# Patient Record
Sex: FEMALE | Race: WHITE | NOT HISPANIC OR LATINO | URBAN - METROPOLITAN AREA
[De-identification: names, ages, dates, MRNs, and addresses within clinical notes are randomized per-mention and may not be internally consistent; named-entity substitution may affect disease eponyms.]

---

## 2020-11-22 ENCOUNTER — INPATIENT (INPATIENT)
Facility: HOSPITAL | Age: 85
LOS: 18 days | Discharge: HOME | DRG: 872 | End: 2020-12-11
Attending: INTERNAL MEDICINE | Admitting: INTERNAL MEDICINE
Payer: MEDICARE

## 2020-11-22 VITALS
SYSTOLIC BLOOD PRESSURE: 131 MMHG | HEIGHT: 63 IN | OXYGEN SATURATION: 97 % | HEART RATE: 79 BPM | RESPIRATION RATE: 16 BRPM | DIASTOLIC BLOOD PRESSURE: 85 MMHG | WEIGHT: 126.1 LBS | TEMPERATURE: 98 F

## 2020-11-22 DIAGNOSIS — A41.9 SEPSIS, UNSPECIFIED ORGANISM: ICD-10-CM

## 2020-11-22 DIAGNOSIS — Z29.9 ENCOUNTER FOR PROPHYLACTIC MEASURES, UNSPECIFIED: ICD-10-CM

## 2020-11-22 DIAGNOSIS — I10 ESSENTIAL (PRIMARY) HYPERTENSION: ICD-10-CM

## 2020-11-22 DIAGNOSIS — H26.9 UNSPECIFIED CATARACT: Chronic | ICD-10-CM

## 2020-11-22 LAB
ALBUMIN SERPL ELPH-MCNC: 2.6 G/DL — LOW (ref 3.5–5)
ALP SERPL-CCNC: 72 U/L — SIGNIFICANT CHANGE UP (ref 40–120)
ALT FLD-CCNC: 23 U/L DA — SIGNIFICANT CHANGE UP (ref 10–60)
ANION GAP SERPL CALC-SCNC: 6 MMOL/L — SIGNIFICANT CHANGE UP (ref 5–17)
APPEARANCE UR: ABNORMAL
AST SERPL-CCNC: 32 U/L — SIGNIFICANT CHANGE UP (ref 10–40)
BASOPHILS # BLD AUTO: 0.02 K/UL — SIGNIFICANT CHANGE UP (ref 0–0.2)
BASOPHILS NFR BLD AUTO: 0.2 % — SIGNIFICANT CHANGE UP (ref 0–2)
BILIRUB SERPL-MCNC: 0.7 MG/DL — SIGNIFICANT CHANGE UP (ref 0.2–1.2)
BILIRUB UR-MCNC: NEGATIVE — SIGNIFICANT CHANGE UP
BUN SERPL-MCNC: 38 MG/DL — HIGH (ref 7–18)
CALCIUM SERPL-MCNC: 9.1 MG/DL — SIGNIFICANT CHANGE UP (ref 8.4–10.5)
CHLORIDE SERPL-SCNC: 102 MMOL/L — SIGNIFICANT CHANGE UP (ref 96–108)
CO2 SERPL-SCNC: 30 MMOL/L — SIGNIFICANT CHANGE UP (ref 22–31)
COLOR SPEC: YELLOW — SIGNIFICANT CHANGE UP
CREAT SERPL-MCNC: 1.11 MG/DL — SIGNIFICANT CHANGE UP (ref 0.5–1.3)
DIFF PNL FLD: ABNORMAL
EOSINOPHIL # BLD AUTO: 0.01 K/UL — SIGNIFICANT CHANGE UP (ref 0–0.5)
EOSINOPHIL NFR BLD AUTO: 0.1 % — SIGNIFICANT CHANGE UP (ref 0–6)
GLUCOSE SERPL-MCNC: 92 MG/DL — SIGNIFICANT CHANGE UP (ref 70–99)
GLUCOSE UR QL: NEGATIVE — SIGNIFICANT CHANGE UP
HCT VFR BLD CALC: 37.6 % — SIGNIFICANT CHANGE UP (ref 34.5–45)
HGB BLD-MCNC: 12.3 G/DL — SIGNIFICANT CHANGE UP (ref 11.5–15.5)
IMM GRANULOCYTES NFR BLD AUTO: 0.6 % — SIGNIFICANT CHANGE UP (ref 0–1.5)
KETONES UR-MCNC: NEGATIVE — SIGNIFICANT CHANGE UP
LACTATE SERPL-SCNC: 1.2 MMOL/L — SIGNIFICANT CHANGE UP (ref 0.7–2)
LEUKOCYTE ESTERASE UR-ACNC: ABNORMAL
LYMPHOCYTES # BLD AUTO: 0.76 K/UL — LOW (ref 1–3.3)
LYMPHOCYTES # BLD AUTO: 6.1 % — LOW (ref 13–44)
MCHC RBC-ENTMCNC: 31.3 PG — SIGNIFICANT CHANGE UP (ref 27–34)
MCHC RBC-ENTMCNC: 32.7 GM/DL — SIGNIFICANT CHANGE UP (ref 32–36)
MCV RBC AUTO: 95.7 FL — SIGNIFICANT CHANGE UP (ref 80–100)
MONOCYTES # BLD AUTO: 1.47 K/UL — HIGH (ref 0–0.9)
MONOCYTES NFR BLD AUTO: 11.9 % — SIGNIFICANT CHANGE UP (ref 2–14)
NEUTROPHILS # BLD AUTO: 10.03 K/UL — HIGH (ref 1.8–7.4)
NEUTROPHILS NFR BLD AUTO: 81.1 % — HIGH (ref 43–77)
NITRITE UR-MCNC: POSITIVE
NRBC # BLD: 0 /100 WBCS — SIGNIFICANT CHANGE UP (ref 0–0)
PH UR: 5 — SIGNIFICANT CHANGE UP (ref 5–8)
PLATELET # BLD AUTO: 155 K/UL — SIGNIFICANT CHANGE UP (ref 150–400)
POTASSIUM SERPL-MCNC: 4 MMOL/L — SIGNIFICANT CHANGE UP (ref 3.5–5.3)
POTASSIUM SERPL-SCNC: 4 MMOL/L — SIGNIFICANT CHANGE UP (ref 3.5–5.3)
PROT SERPL-MCNC: 7.1 G/DL — SIGNIFICANT CHANGE UP (ref 6–8.3)
PROT UR-MCNC: 100
RAPID RVP RESULT: SIGNIFICANT CHANGE UP
RBC # BLD: 3.93 M/UL — SIGNIFICANT CHANGE UP (ref 3.8–5.2)
RBC # FLD: 13.6 % — SIGNIFICANT CHANGE UP (ref 10.3–14.5)
SARS-COV-2 RNA SPEC QL NAA+PROBE: SIGNIFICANT CHANGE UP
SODIUM SERPL-SCNC: 138 MMOL/L — SIGNIFICANT CHANGE UP (ref 135–145)
SP GR SPEC: 1.01 — SIGNIFICANT CHANGE UP (ref 1.01–1.02)
UROBILINOGEN FLD QL: NEGATIVE — SIGNIFICANT CHANGE UP
WBC # BLD: 12.37 K/UL — HIGH (ref 3.8–10.5)
WBC # FLD AUTO: 12.37 K/UL — HIGH (ref 3.8–10.5)

## 2020-11-22 PROCEDURE — 93010 ELECTROCARDIOGRAM REPORT: CPT

## 2020-11-22 PROCEDURE — 99285 EMERGENCY DEPT VISIT HI MDM: CPT

## 2020-11-22 PROCEDURE — 71045 X-RAY EXAM CHEST 1 VIEW: CPT | Mod: 26

## 2020-11-22 RX ORDER — SODIUM CHLORIDE 9 MG/ML
1700 INJECTION INTRAMUSCULAR; INTRAVENOUS; SUBCUTANEOUS ONCE
Refills: 0 | Status: COMPLETED | OUTPATIENT
Start: 2020-11-22 | End: 2020-11-22

## 2020-11-22 RX ORDER — ASPIRIN/CALCIUM CARB/MAGNESIUM 324 MG
81 TABLET ORAL DAILY
Refills: 0 | Status: DISCONTINUED | OUTPATIENT
Start: 2020-11-22 | End: 2020-11-25

## 2020-11-22 RX ORDER — ENOXAPARIN SODIUM 100 MG/ML
40 INJECTION SUBCUTANEOUS DAILY
Refills: 0 | Status: DISCONTINUED | OUTPATIENT
Start: 2020-11-22 | End: 2020-11-24

## 2020-11-22 RX ORDER — CEFTRIAXONE 500 MG/1
1000 INJECTION, POWDER, FOR SOLUTION INTRAMUSCULAR; INTRAVENOUS EVERY 24 HOURS
Refills: 0 | Status: COMPLETED | OUTPATIENT
Start: 2020-11-22 | End: 2020-11-29

## 2020-11-22 RX ORDER — CLOPIDOGREL BISULFATE 75 MG/1
75 TABLET, FILM COATED ORAL DAILY
Refills: 0 | Status: DISCONTINUED | OUTPATIENT
Start: 2020-11-22 | End: 2020-11-24

## 2020-11-22 RX ORDER — AMLODIPINE BESYLATE 2.5 MG/1
5 TABLET ORAL DAILY
Refills: 0 | Status: DISCONTINUED | OUTPATIENT
Start: 2020-11-22 | End: 2020-11-24

## 2020-11-22 RX ORDER — SODIUM CHLORIDE 9 MG/ML
1000 INJECTION INTRAMUSCULAR; INTRAVENOUS; SUBCUTANEOUS
Refills: 0 | Status: DISCONTINUED | OUTPATIENT
Start: 2020-11-22 | End: 2020-12-11

## 2020-11-22 RX ORDER — CEFTRIAXONE 500 MG/1
1000 INJECTION, POWDER, FOR SOLUTION INTRAMUSCULAR; INTRAVENOUS ONCE
Refills: 0 | Status: COMPLETED | OUTPATIENT
Start: 2020-11-22 | End: 2020-11-22

## 2020-11-22 RX ADMIN — SODIUM CHLORIDE 1700 MILLILITER(S): 9 INJECTION INTRAMUSCULAR; INTRAVENOUS; SUBCUTANEOUS at 18:14

## 2020-11-22 RX ADMIN — CEFTRIAXONE 100 MILLIGRAM(S): 500 INJECTION, POWDER, FOR SOLUTION INTRAMUSCULAR; INTRAVENOUS at 18:18

## 2020-11-22 NOTE — H&P ADULT - NSICDXPASTMEDICALHX_GEN_ALL_CORE_FT
PAST MEDICAL HISTORY:  Scoliosis, unspecified scoliosis type, unspecified spinal region     Spinal stenosis, unspecified spinal region

## 2020-11-22 NOTE — H&P ADULT - HISTORY OF PRESENT ILLNESS
85 yr F from home with PMH of spinal stenosis, scoliosis presents to ED with complaints of  lower abdominal pain and burning with urination since 4 days. Pt reports increased urge to urinate and burning with urination beginning four days ago. Pt also complains dull lower abdominal pain when she urinates, 6/10 in severity and non-radiating. 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 Pt denies blood in urine, diarrhea, vomiting, chest pain, difficulty breathing, fevers, or recent sick contacts.    In ED /74, HR 95     85 yr F from home with PMH of HTN, spinal stenosis, scoliosis presents to ED with complaints of  lower abdominal pain and burning with urination since 4 days. Pt reports increased urge to urinate and burning with urination beginning four days ago. Pt also complains dull lower abdominal pain when she urinates, 6/10 in severity and non-radiating. 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 Pt denies blood in urine, diarrhea, vomiting, chest pain, difficulty breathing, fevers, or recent sick contacts.    In ED /74, HR 95

## 2020-11-22 NOTE — ED ADULT NURSE NOTE - OBJECTIVE STATEMENT
Patient arrives to ED w/ c/o increased urination, burning and frequency. Denies chills, fever, flank pain or n/v/d.

## 2020-11-22 NOTE — ED PROVIDER NOTE - OBJECTIVE STATEMENT
Pt is an 86 y/o F with PMH of spinal stenosis, scoliosis and cardiovascular disease presenting for acute 4 day lower abdominal pain and burning w/ urination. Pt says she had increasing urge to urinate and burning with urination beginning four days ago. Pt also endorses lower abdominal pain when she urinates, 6 out of 10 in severity. Pain does not radiate and is "dull". 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 Pt denies diarrhea, vomiting, chest pain, difficulty breathing, fevers, or recent sick contacts. Pt was former smoker (1 pack per month from age [age]) but has since quit, drinks wine socially, no illicit drug use. Surgical hx significant for cataract surgery. Medications include clopidogrel, aspirin, statin, lisinopril and amlodipine. No allergies. Pt is an 86 y/o F with PMH of spinal stenosis, scoliosis and cardiovascular disease presenting for acute 4 day lower abdominal pain and burning w/ urination. Pt says she had increasing urge to urinate and burning with urination beginning four days ago. Pt also endorses lower abdominal pain when she urinates, 6 out of 10 in severity. Pain does not radiate and is "dull". 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 Pt denies blood in urine, diarrhea, vomiting, chest pain, difficulty breathing, fevers, or recent sick contacts. Pt was former smoker (1 pack per month from age [age]) but has since quit, drinks wine socially, no illicit drug use. Surgical hx significant for cataract surgery. Medications include clopidogrel, aspirin, statin, lisinopril and amlodipine. No allergies. Pt is an 86 y/o F with PMH of spinal stenosis, scoliosis and cardiovascular disease presenting for acute 4 day lower abdominal pain and burning with urination. Pt says she had increasing urge to urinate and burning with urination beginning four days ago. Pt also endorses lower abdominal pain when she urinates, 6 out of 10 in severity. Pain does not radiate and is "dull". 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 Pt denies blood in urine, diarrhea, vomiting, chest pain, difficulty breathing, fevers, or recent sick contacts. Pt was former smoker (1 pack per month from age [age]) but has since quit, drinks wine socially, no illicit drug use. Surgical hx significant for cataract surgery. Medications include clopidogrel, aspirin, statin, lisinopril and amlodipine. No allergies.

## 2020-11-22 NOTE — CHART NOTE - NSCHARTNOTEFT_GEN_A_CORE
EVENT:      86 y/o female with PMH of HTN, spinal stenosis, scoliosis presented to ED with complaints of  lower abdominal pain and burning with urination since 4 days. Pt reported increased urge to urinate and burning with urination beginning four days ago. Pt also complained dull lower abdominal pain when she urinates, 6/10 in severity and non-radiating. 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 Pt denied blood in urine, diarrhea, vomiting, chest pain, difficulty breathing, fevers, or recent sick contacts.   11/22/20, 20:30 pm, patient's EKG showed rapid A.fib with HR fluctuating from 120 to 130 bpm. Discussed with Dr. Bailon.    OBJECTIVE:  Vital Signs Last 24 Hrs  T(C): 37.4 (22 Nov 2020 19:34), Max: 37.4 (22 Nov 2020 19:34)  T(F): 99.3 (22 Nov 2020 19:34), Max: 99.3 (22 Nov 2020 19:34)  HR: 112 (22 Nov 2020 20:24) (79 - 120)  BP: 125/64 (22 Nov 2020 19:34) (116/74 - 131/85)  BP(mean): --  RR: 16 (22 Nov 2020 19:34) (16 - 16)  SpO2: 95% (22 Nov 2020 19:34) (95% - 97%)    FOCUSED PHYSICAL EXAM:    LABS:                        12.3   12.37 )-----------( 155      ( 22 Nov 2020 16:49 )             37.6     11-22    138  |  102  |  38<H>  ----------------------------<  92  4.0   |  30  |  1.11    Ca    9.1      22 Nov 2020 16:49    TPro  7.1  /  Alb  2.6<L>  /  TBili  0.7  /  DBili  x   /  AST  32  /  ALT  23  /  AlkPhos  72  11-22    PLAN: 1. Admit. to Inpatient Telemetry, for rapid A.fib (24 hrs tele observation),   2. Remote Telemetry/EKG bedside.     FOLLOW UP / RESULT: Safety measures.

## 2020-11-22 NOTE — H&P ADULT - PROBLEM SELECTOR PLAN 1
p/w lower abdominal pain and urinary symptoms  /74, HR 95, WBC 12.37 K  UA +ve   Sepsis 2/2 UTI  s/p Ceftriaxone and 1700 cc in ED  Started on ceftriaxone  f/u Urine Cx and Blood Cx  ID  p/w lower abdominal pain and urinary symptoms  /74, HR 95, WBC 12.37 K  Urinalysis +ve   Sepsis likely secondary to UTI  s/p Ceftriaxone and 1700 cc in ED  Started on ceftriaxone  on iv fluids  f/u Urine Cx and Blood Cx  ID  p/w lower abdominal pain and urinary symptoms  /74, HR 95, WBC 12.37 K  Urinalysis +ve   Sepsis likely secondary to UTI  s/p Ceftriaxone and 1700 cc in ED  Started on ceftriaxone  on iv fluids  f/u Urine Cx and Blood Cx  ID Dr. Pulido

## 2020-11-22 NOTE — ED PROVIDER NOTE - GASTROINTESTINAL NEGATIVE STATEMENT, MLM
You suffered a head injury today.  Based on the mechanism of injury, physical examination findings and test results your risk of having a serious problem is low.  However, on rare occasions there are serious problems, such as bleeding or swelling in the brain, that develop hours or days later.  Your symptoms should gradually improve over time.  If you develop any new problems that concern you or any significant worsening of your symptoms, including but not limited to severe headache, repetitive vomiting, confusion or excessive sleepiness, please return to the ED immediately for further evaluation.     no bloating, no constipation, no diarrhea, no nausea and no vomiting.

## 2020-11-22 NOTE — ED PROVIDER NOTE - ATTENDING CONTRIBUTION TO CARE
This patient was evaluated by the medical student with my supervision. I performed an independent face-to-face evaluation of this patient including HPI, ROS, and PE. See my documentation above. I reviewed all laboratory and radiologic results.     Patient c/o 4 days lower abdominal pain, dysuria, urgency, frequency. No fever, cp, sob, n/v/d, back pain.    Gen: Well-developed, well-nourished, NAD, VS as noted by nursing. HEENT: NCAT, mmm   Chest: RRR, nl S1 and S2, no m/r/g. Resp: CTAB, no w/r/r  Abd: nl BS, soft, non-distended. mild suprapubic tenderness. No cva tenderness.  Ext: Warm, dry  Neuro: CN II-XII intact, normal and equal strength, sensation, and reflexes bilaterally.   Psych: AAOx3

## 2020-11-22 NOTE — ED PROVIDER NOTE - CARE PLAN
Principal Discharge DX:	Sepsis without acute organ dysfunction, due to unspecified organism  Secondary Diagnosis:	Urinary tract infection without hematuria, site unspecified

## 2020-11-22 NOTE — ED ADULT TRIAGE NOTE - ARRIVAL INFO ADDITIONAL COMMENTS
As per EMS-pt lives alone, spouse passed away in March this year ,has no family and house was very cluttered

## 2020-11-22 NOTE — ED ADULT NURSE NOTE - NSIMPLEMENTINTERV_GEN_ALL_ED
Implemented All Fall with Harm Risk Interventions:  [place] to call system. Call bell, personal items and telephone within reach. Instruct patient to call for assistance. Room bathroom lighting operational. Non-slip footwear when patient is off stretcher. Physically safe environment: no spills, clutter or unnecessary equipment. Stretcher in lowest position, wheels locked, appropriate side rails in place. Provide visual cue, wrist band, yellow gown, etc. Monitor gait and stability. Monitor for mental status changes and reorient to person, place, and time. Review medications for side effects contributing to fall risk. Reinforce activity limits and safety measures with patient and family. Provide visual clues: red socks.

## 2020-11-22 NOTE — H&P ADULT - PROBLEM SELECTOR PLAN 2
RISK                                                          Points  [  ] Previous VTE                                                3  [  ] Thrombophilia                                             2  [  ] Lower limb paralysis                                   2        (unable to hold up >15 seconds)    [  ] Current Cancer                                             2         (within 6 months)  [ x ] Immobilization > 24 hrs                              1  [  ] ICU/CCU stay > 24 hours                             1  [ x ] Age > 60                                                         1    IMPROVE VTE Score: 2  lovenox for VTE prophylaxis. c/w amlodipine 5 mg  Monitor BP

## 2020-11-22 NOTE — H&P ADULT - NSHPPHYSICALEXAM_GEN_ALL_CORE
General - NAD  Eyes - PERRLA, EOM intact  ENT - Nonicteric sclerae, PERRLA, EOMI. Oropharynx clear. Moist mucous membranes. Conjunctivae appear well perfused.   Neck - No noticeable or palpable swelling, redness or rash around throat or on face  Lymph Nodes - No lymphadenopathy  Cardiovascular - RRR no m/r/g, no JVD, no carotid bruits  Lungs - Clear to ascultation, no use of accessory muscles, no crackles or wheezes.  Skin - No rashes, skin warm and dry, no erythematous areas  Abdomen - Normal bowel sounds, abdomen soft and nontender, no hepatosplenomegaly.  Extremities - No edema, cyanosis or clubbing  MusculoSkeletal - 5/5 strength, normal range of motion, no swollen or erythematous  joints.  Neurological – Alert and oriented x 3, CN 2-12 grossly intact.

## 2020-11-22 NOTE — ED PROVIDER NOTE - CHIEF COMPLAINT
The patient is a 85y Female complaining of four days of lower abdominal pain and burning with urination.

## 2020-11-22 NOTE — ED PROVIDER NOTE - PMH
Scoliosis, unspecified scoliosis type, unspecified spinal region    Spinal stenosis, unspecified spinal region

## 2020-11-22 NOTE — ED ADULT NURSE NOTE - CHPI ED NUR SYMPTOMS NEG
no fever/no abdominal distension/no dysuria/no hematuria/no diarrhea/no blood in stool/no nausea/no chills

## 2020-11-22 NOTE — ED PROVIDER NOTE - PROGRESS NOTE DETAILS
Doug: Patient reports she has unsteady gait at baseline. uses shopping cart for support, but has been more steady for the past 4 days since urinary sx started. Also has chronic cough, unchanged from baseline. Will check CBC, CMP, RVP, reassess. Patient requested help with getting a home health aide. I spoke with DANIEL Schmitt. She said it cannot be set up from ED but PMD can refer her to a home care agency. She will call back with information for private pay agencies. Code sepsis called due to leukocytosis and HR>90. Patient is resting comfortably, NAD. Patient initially insisted nurse remove IV after blood draw but now agrees to have new IV placed for IV and antibiotics. Endorsed to Dr. Serrano.

## 2020-11-22 NOTE — H&P ADULT - NSHPSOCIALHISTORY_GEN_ALL_CORE
Pt was former smoker (1 pack per month from age [age]) but has since quit, drinks wine socially, no illicit drug use.

## 2020-11-23 LAB
A1C WITH ESTIMATED AVERAGE GLUCOSE RESULT: 5.4 % — SIGNIFICANT CHANGE UP (ref 4–5.6)
ALBUMIN SERPL ELPH-MCNC: 2.1 G/DL — LOW (ref 3.5–5)
ALP SERPL-CCNC: 70 U/L — SIGNIFICANT CHANGE UP (ref 40–120)
ALT FLD-CCNC: 27 U/L DA — SIGNIFICANT CHANGE UP (ref 10–60)
ANION GAP SERPL CALC-SCNC: 8 MMOL/L — SIGNIFICANT CHANGE UP (ref 5–17)
AST SERPL-CCNC: 40 U/L — SIGNIFICANT CHANGE UP (ref 10–40)
BASOPHILS # BLD AUTO: 0.01 K/UL — SIGNIFICANT CHANGE UP (ref 0–0.2)
BASOPHILS NFR BLD AUTO: 0.1 % — SIGNIFICANT CHANGE UP (ref 0–2)
BILIRUB SERPL-MCNC: 0.6 MG/DL — SIGNIFICANT CHANGE UP (ref 0.2–1.2)
BUN SERPL-MCNC: 24 MG/DL — HIGH (ref 7–18)
CALCIUM SERPL-MCNC: 7.9 MG/DL — LOW (ref 8.4–10.5)
CHLORIDE SERPL-SCNC: 110 MMOL/L — HIGH (ref 96–108)
CHOLEST SERPL-MCNC: 115 MG/DL — SIGNIFICANT CHANGE UP
CO2 SERPL-SCNC: 24 MMOL/L — SIGNIFICANT CHANGE UP (ref 22–31)
CREAT SERPL-MCNC: 0.71 MG/DL — SIGNIFICANT CHANGE UP (ref 0.5–1.3)
EOSINOPHIL # BLD AUTO: 0.01 K/UL — SIGNIFICANT CHANGE UP (ref 0–0.5)
EOSINOPHIL NFR BLD AUTO: 0.1 % — SIGNIFICANT CHANGE UP (ref 0–6)
ESTIMATED AVERAGE GLUCOSE: 108 MG/DL — SIGNIFICANT CHANGE UP (ref 68–114)
FOLATE SERPL-MCNC: 9.4 NG/ML — SIGNIFICANT CHANGE UP
GLUCOSE SERPL-MCNC: 100 MG/DL — HIGH (ref 70–99)
HCT VFR BLD CALC: 34.4 % — LOW (ref 34.5–45)
HDLC SERPL-MCNC: 55 MG/DL — SIGNIFICANT CHANGE UP
HGB BLD-MCNC: 11.3 G/DL — LOW (ref 11.5–15.5)
IMM GRANULOCYTES NFR BLD AUTO: 1.1 % — SIGNIFICANT CHANGE UP (ref 0–1.5)
LACTATE SERPL-SCNC: 1 MMOL/L — SIGNIFICANT CHANGE UP (ref 0.7–2)
LIPID PNL WITH DIRECT LDL SERPL: 46 MG/DL — SIGNIFICANT CHANGE UP
LYMPHOCYTES # BLD AUTO: 0.92 K/UL — LOW (ref 1–3.3)
LYMPHOCYTES # BLD AUTO: 7.9 % — LOW (ref 13–44)
MAGNESIUM SERPL-MCNC: 2.1 MG/DL — SIGNIFICANT CHANGE UP (ref 1.6–2.6)
MCHC RBC-ENTMCNC: 31.4 PG — SIGNIFICANT CHANGE UP (ref 27–34)
MCHC RBC-ENTMCNC: 32.8 GM/DL — SIGNIFICANT CHANGE UP (ref 32–36)
MCV RBC AUTO: 95.6 FL — SIGNIFICANT CHANGE UP (ref 80–100)
MONOCYTES # BLD AUTO: 1.43 K/UL — HIGH (ref 0–0.9)
MONOCYTES NFR BLD AUTO: 12.3 % — SIGNIFICANT CHANGE UP (ref 2–14)
NEUTROPHILS # BLD AUTO: 9.13 K/UL — HIGH (ref 1.8–7.4)
NEUTROPHILS NFR BLD AUTO: 78.5 % — HIGH (ref 43–77)
NON HDL CHOLESTEROL: 60 MG/DL — SIGNIFICANT CHANGE UP
NRBC # BLD: 0 /100 WBCS — SIGNIFICANT CHANGE UP (ref 0–0)
PHOSPHATE SERPL-MCNC: 2.4 MG/DL — LOW (ref 2.5–4.5)
PLATELET # BLD AUTO: 144 K/UL — LOW (ref 150–400)
POTASSIUM SERPL-MCNC: 3.5 MMOL/L — SIGNIFICANT CHANGE UP (ref 3.5–5.3)
POTASSIUM SERPL-SCNC: 3.5 MMOL/L — SIGNIFICANT CHANGE UP (ref 3.5–5.3)
PROT SERPL-MCNC: 5.8 G/DL — LOW (ref 6–8.3)
RBC # BLD: 3.6 M/UL — LOW (ref 3.8–5.2)
RBC # FLD: 13.6 % — SIGNIFICANT CHANGE UP (ref 10.3–14.5)
SARS-COV-2 IGG SERPL QL IA: POSITIVE
SARS-COV-2 IGM SERPL IA-ACNC: 51.5 INDEX — HIGH
SODIUM SERPL-SCNC: 142 MMOL/L — SIGNIFICANT CHANGE UP (ref 135–145)
TRIGL SERPL-MCNC: 71 MG/DL — SIGNIFICANT CHANGE UP
TSH SERPL-MCNC: 1.9 UU/ML — SIGNIFICANT CHANGE UP (ref 0.34–4.82)
VIT B12 SERPL-MCNC: 637 PG/ML — SIGNIFICANT CHANGE UP (ref 232–1245)
WBC # BLD: 11.63 K/UL — HIGH (ref 3.8–10.5)
WBC # FLD AUTO: 11.63 K/UL — HIGH (ref 3.8–10.5)

## 2020-11-23 RX ORDER — NYSTATIN CREAM 100000 [USP'U]/G
1 CREAM TOPICAL
Refills: 0 | Status: DISCONTINUED | OUTPATIENT
Start: 2020-11-23 | End: 2020-12-03

## 2020-11-23 RX ORDER — SODIUM CHLORIDE 9 MG/ML
1000 INJECTION INTRAMUSCULAR; INTRAVENOUS; SUBCUTANEOUS ONCE
Refills: 0 | Status: COMPLETED | OUTPATIENT
Start: 2020-11-23 | End: 2020-11-23

## 2020-11-23 RX ORDER — LANOLIN ALCOHOL/MO/W.PET/CERES
3 CREAM (GRAM) TOPICAL ONCE
Refills: 0 | Status: COMPLETED | OUTPATIENT
Start: 2020-11-23 | End: 2020-11-23

## 2020-11-23 RX ORDER — ACETAMINOPHEN 500 MG
650 TABLET ORAL EVERY 6 HOURS
Refills: 0 | Status: DISCONTINUED | OUTPATIENT
Start: 2020-11-23 | End: 2020-12-11

## 2020-11-23 RX ADMIN — Medication 3 MILLIGRAM(S): at 21:33

## 2020-11-23 RX ADMIN — CLOPIDOGREL BISULFATE 75 MILLIGRAM(S): 75 TABLET, FILM COATED ORAL at 11:28

## 2020-11-23 RX ADMIN — Medication 650 MILLIGRAM(S): at 22:03

## 2020-11-23 RX ADMIN — Medication 650 MILLIGRAM(S): at 05:00

## 2020-11-23 RX ADMIN — Medication 650 MILLIGRAM(S): at 00:59

## 2020-11-23 RX ADMIN — Medication 650 MILLIGRAM(S): at 21:33

## 2020-11-23 RX ADMIN — CEFTRIAXONE 100 MILLIGRAM(S): 500 INJECTION, POWDER, FOR SOLUTION INTRAMUSCULAR; INTRAVENOUS at 05:04

## 2020-11-23 RX ADMIN — AMLODIPINE BESYLATE 5 MILLIGRAM(S): 2.5 TABLET ORAL at 05:03

## 2020-11-23 RX ADMIN — Medication 650 MILLIGRAM(S): at 07:25

## 2020-11-23 RX ADMIN — NYSTATIN CREAM 1 APPLICATION(S): 100000 CREAM TOPICAL at 17:59

## 2020-11-23 RX ADMIN — Medication 81 MILLIGRAM(S): at 11:28

## 2020-11-23 RX ADMIN — Medication 650 MILLIGRAM(S): at 06:17

## 2020-11-23 RX ADMIN — ENOXAPARIN SODIUM 40 MILLIGRAM(S): 100 INJECTION SUBCUTANEOUS at 11:28

## 2020-11-23 RX ADMIN — SODIUM CHLORIDE 2000 MILLILITER(S): 9 INJECTION INTRAMUSCULAR; INTRAVENOUS; SUBCUTANEOUS at 10:34

## 2020-11-23 RX ADMIN — SODIUM CHLORIDE 80 MILLILITER(S): 9 INJECTION INTRAMUSCULAR; INTRAVENOUS; SUBCUTANEOUS at 11:28

## 2020-11-23 RX ADMIN — SODIUM CHLORIDE 80 MILLILITER(S): 9 INJECTION INTRAMUSCULAR; INTRAVENOUS; SUBCUTANEOUS at 00:59

## 2020-11-23 NOTE — CONSULT NOTE ADULT - SUBJECTIVE AND OBJECTIVE BOX
Patient is a 85y old  Female who presents with a chief complaint of abdominal pain and urinary symptoms (2020 12:29)      REVIEW OF SYSTEMS: Total of twelve systems have been reviewed with patient and found to be negative unless mentioned in HPI        PAST MEDICAL & SURGICAL HISTORY:  Spinal stenosis, unspecified spinal region  Scoliosis, unspecified scoliosis type, unspecified spinal region  Cataract of both eyes, unspecified cataract type        SOCIAL HISTORY  Alcohol: Does not drink  Tobacco: Does not smoke  Illicit substance use: None      FAMILY HISTORY: Non contributory to the present illness        ALLERGIES: No Known Allergies        Vital Signs Last 24 Hrs  T(C): 36 (2020 11:15), Max: 38.4 (2020 00:39)  T(F): 96.8 (2020 11:15), Max: 101.1 (2020 00:39)  HR: 99 (2020 11:15) (99 - 120)  BP: 106/60 (2020 11:15) (106/60 - 131/78)  BP(mean): --  RR: 18 (2020 11:15) (16 - 18)  SpO2: 98% (2020 11:15) (95% - 98%)      PHYSICAL EXAM:  GENERAL: Not in distress   CHEST/LUNG:  Aire ntry bilaterally  HEART: s1 and s2 present  ABDOMEN:  Nontender and  Nondistended  EXTREMITIES: No pedal  edema  CNS: Awake and Alert      LABS:                        11.3   11.63 )-----------( 144      ( 2020 06:02 )             34.4       11-23    142  |  110<H>  |  24<H>  ----------------------------<  100<H>  3.5   |  24  |  0.71    Ca    7.9<L>      2020 06:02  Phos  2.4       Mg     2.1         TPro  5.8<L>  /  Alb  2.1<L>  /  TBili  0.6  /  DBili  x   /  AST  40  /  ALT  27  /  AlkPhos  70  23        Urinalysis Basic - ( 2020 16:09 )  Color: Yellow / Appearance: Slightly Turbid / S.015 / pH: x  Gluc: x / Ketone: Negative  / Bili: Negative / Urobili: Negative   Blood: x / Protein: 100 / Nitrite: Positive   Leuk Esterase: Moderate / RBC: 5-10 /HPF / WBC >50 /HPF   Sq Epi: x / Non Sq Epi: Few /HPF / Bacteria: Moderate /HPF        MEDICATIONS  (STANDING):  amLODIPine   Tablet 5 milliGRAM(s) Oral daily  aspirin  chewable 81 milliGRAM(s) Oral daily  cefTRIAXone   IVPB 1000 milliGRAM(s) IV Intermittent every 24 hours  clopidogrel Tablet 75 milliGRAM(s) Oral daily  enoxaparin Injectable 40 milliGRAM(s) SubCutaneous daily  nystatin Ointment 1 Application(s) Topical two times a day  sodium chloride 0.9%. 1000 milliLiter(s) (80 mL/Hr) IV Continuous <Continuous>    MEDICATIONS  (PRN):  acetaminophen   Tablet .. 650 milliGRAM(s) Oral every 6 hours PRN Temp greater or equal to 38C (100.4F)        RADIOLOGY & ADDITIONAL TESTS:               Patient is a 85y old  Female from home with PMH of HTN, spinal stenosis, scoliosis presents to ER for evaluation of  lower abdominal pain and dysuria and increased  urinary frequency. On admission, she found to have fever, tachycardia, Leukocytosis and positive Urine analysis. The CXR shows Small left base infiltrate. She has started on Ceftriaxone and the Id consult requested to assist with further evaluation and antibiotic management.         REVIEW OF SYSTEMS: Total of twelve systems have been reviewed with patient and found to be negative unless mentioned in HPI        PAST MEDICAL & SURGICAL HISTORY:  Spinal stenosis, unspecified spinal region  Scoliosis, unspecified scoliosis type, unspecified spinal region  Cataract of both eyes, unspecified cataract type        SOCIAL HISTORY  Alcohol: Does not drink  Tobacco: Does not smoke  Illicit substance use: None      FAMILY HISTORY: Non contributory to the present illness        ALLERGIES: No Known Allergies        Vital Signs Last 24 Hrs  T(C): 36 (2020 11:15), Max: 38.4 (2020 00:39)  T(F): 96.8 (:15), Max: 101.1 (2020 00:39)  HR: 99 (:15) (99 - 120)  BP: 106/60 (2020 11:15) (106/60 - 131/78)  BP(mean): --  RR: 18 (:15) (16 - 18)  SpO2: 98% (2020 11:15) (95% - 98%)        PHYSICAL EXAM:  GENERAL: Not in distress   CHEST/LUNG: Not using accessory muscles   HEART: s1 and s2 present  ABDOMEN: Lower abdominal tenderness  EXTREMITIES: No pedal  edema  CNS: Awake and Alert      LABS:                        11.3   11.63 )-----------( 144      ( 2020 06:02 )             34.4       1123    142  |  110<H>  |  24<H>  ----------------------------<  100<H>  3.5   |  24  |  0.71    Ca    7.9<L>      2020 06:02  Phos  2.4       Mg     2.1         TPro  5.8<L>  /  Alb  2.1<L>  /  TBili  0.6  /  DBili  x   /  AST  40  /  ALT  27  /  AlkPhos  70          Urinalysis Basic - ( 2020 16:09 )  Color: Yellow / Appearance: Slightly Turbid / S.015 / pH: x  Gluc: x / Ketone: Negative  / Bili: Negative / Urobili: Negative   Blood: x / Protein: 100 / Nitrite: Positive   Leuk Esterase: Moderate / RBC: 5-10 /HPF / WBC >50 /HPF   Sq Epi: x / Non Sq Epi: Few /HPF / Bacteria: Moderate /HPF        MEDICATIONS  (STANDING):  amLODIPine   Tablet 5 milliGRAM(s) Oral daily  aspirin  chewable 81 milliGRAM(s) Oral daily  cefTRIAXone   IVPB 1000 milliGRAM(s) IV Intermittent every 24 hours  clopidogrel Tablet 75 milliGRAM(s) Oral daily  enoxaparin Injectable 40 milliGRAM(s) SubCutaneous daily  nystatin Ointment 1 Application(s) Topical two times a day  sodium chloride 0.9%. 1000 milliLiter(s) (80 mL/Hr) IV Continuous <Continuous>    MEDICATIONS  (PRN):  acetaminophen   Tablet .. 650 milliGRAM(s) Oral every 6 hours PRN Temp greater or equal to 38C (100.4F)        RADIOLOGY & ADDITIONAL TESTS:    20 : Xray Chest 1 View- PORTABLE-Urgent (Xray Chest 1 View- PORTABLE-Urgent .) (20 @ 18:06) Small left base infiltrate.

## 2020-11-23 NOTE — PROGRESS NOTE ADULT - SUBJECTIVE AND OBJECTIVE BOX
JOSE GARDNER  85y Female  MRN:[id number]    Patient is a 85y old  Female who presents with a chief complaint of abdominal pain and urinary symptoms (2020 18:02)    HPI:  85 yr F from home with PMH of HTN, spinal stenosis, scoliosis presents to ED with complaints of  lower abdominal pain and burning with urination since 4 days. Pt reports increased urge to urinate and burning with urination beginning four days ago. Pt also complains dull lower abdominal pain when she urinates, 6/10 in severity and non-radiating. 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 Pt denies blood in urine, diarrhea, vomiting, chest pain, difficulty breathing, fevers, or recent sick contacts.    In ED /74, HR 95     (2020 18:02)      Patient seen and evaluated at bedside. No acute events overnight except as noted.    Interval HPI: no events o/n    PAST MEDICAL & SURGICAL HISTORY:  Spinal stenosis, unspecified spinal region    Scoliosis, unspecified scoliosis type, unspecified spinal region    Cataract of both eyes, unspecified cataract type        REVIEW OF SYSTEMS:  as per hpi    VITALS:  Vital Signs Last 24 Hrs  T(C): 36 (2020 11:15), Max: 38.4 (2020 00:39)  T(F): 96.8 (2020 11:15), Max: 101.1 (2020 00:39)  HR: 99 (2020 11:15) (99 - 120)  BP: 106/60 (2020 11:15) (106/60 - 131/78)  BP(mean): --  RR: 18 (2020 11:15) (16 - 18)  SpO2: 98% (2020 11:15) (95% - 98%)  CAPILLARY BLOOD GLUCOSE        I&O's Summary      PHYSICAL EXAM:  GENERAL: NAD, well-developed  HEAD:  Atraumatic, Normocephalic  EYES: EOMI, PERRLA, conjunctiva and sclera clear  NECK: Supple, No JVD  CHEST/LUNG: Clear to auscultation bilaterally; No wheeze  HEART: S1, S2; No murmurs, rubs, or gallops  ABDOMEN: Soft, Nontender, Nondistended; Bowel sounds present  EXTREMITIES:  2+ Peripheral Pulses, No clubbing, cyanosis, or edema  PSYCH: Normal affect  NEUROLOGY: AAOX3; non-focal  SKIN: No rashes or lesions    Consultant(s) Notes Reviewed:  [x ] YES  [ ] NO  Care Discussed with Consultants/Other Providers [ x] YES  [ ] NO    MEDS:  MEDICATIONS  (STANDING):  amLODIPine   Tablet 5 milliGRAM(s) Oral daily  aspirin  chewable 81 milliGRAM(s) Oral daily  cefTRIAXone   IVPB 1000 milliGRAM(s) IV Intermittent every 24 hours  clopidogrel Tablet 75 milliGRAM(s) Oral daily  enoxaparin Injectable 40 milliGRAM(s) SubCutaneous daily  nystatin Ointment 1 Application(s) Topical two times a day  sodium chloride 0.9%. 1000 milliLiter(s) (80 mL/Hr) IV Continuous <Continuous>    MEDICATIONS  (PRN):  acetaminophen   Tablet .. 650 milliGRAM(s) Oral every 6 hours PRN Temp greater or equal to 38C (100.4F)    ALLERGIES:  No Known Allergies      LABS:                        11.3   11.63 )-----------( 144      ( 2020 06:02 )             34.4         142  |  110<H>  |  24<H>  ----------------------------<  100<H>  3.5   |  24  |  0.71    Ca    7.9<L>      2020 06:02  Phos  2.4       Mg     2.1         TPro  5.8<L>  /  Alb  2.1<L>  /  TBili  0.6  /  DBili  x   /  AST  40  /  ALT  27  /  AlkPhos  70            LIVER FUNCTIONS - ( 2020 06:02 )  Alb: 2.1 g/dL / Pro: 5.8 g/dL / ALK PHOS: 70 U/L / ALT: 27 U/L DA / AST: 40 U/L / GGT: x           Urinalysis Basic - ( 2020 16:09 )    Color: Yellow / Appearance: Slightly Turbid / S.015 / pH: x  Gluc: x / Ketone: Negative  / Bili: Negative / Urobili: Negative   Blood: x / Protein: 100 / Nitrite: Positive   Leuk Esterase: Moderate / RBC: 5-10 /HPF / WBC >50 /HPF   Sq Epi: x / Non Sq Epi: Few /HPF / Bacteria: Moderate /HPF      TSH: Thyroid Stimulating Hormone, Serum: 1.90 uU/mL ( @ 06:02)    A1c:  BNP:  Lipid panel:   cultures:     RADIOLOGY & ADDITIONAL TESTS:    Imaging Personally Reviewed:  [ ] YES  [ ] NO

## 2020-11-23 NOTE — PHYSICAL THERAPY INITIAL EVALUATION ADULT - PERTINENT HX OF CURRENT PROBLEM, REHAB EVAL
Patient admitted from home due to lower abdominal pain, burning pain w/ urination, Spinal Stenosis, Scoliosis.

## 2020-11-23 NOTE — CHART NOTE - NSCHARTNOTEFT_GEN_A_CORE
After patient arrived on the floor, she had complaints of abdominal pain and concerns that she was retaining urine. Also adamant that she did not receive IV antibiotics. Bladder scan obtained that showed urinary retention to >700cc urine. Jaimes catheter placed, will trial of void as UTI improves. Chart reviewed in depth, patient obtained a dose of ceftriaxone 5AM 11/23/2020.

## 2020-11-23 NOTE — PHYSICAL THERAPY INITIAL EVALUATION ADULT - CRITERIA FOR SKILLED THERAPEUTIC INTERVENTIONS
rehab potential/therapy frequency/anticipated discharge recommendation/risk reduction/prevention/impairments found/functional limitations in following categories

## 2020-11-23 NOTE — PHYSICAL THERAPY INITIAL EVALUATION ADULT - RANGE OF MOTION EXAMINATION, REHAB EVAL
Right LE ROM was WFL (within functional limits)/Left LE ROM was WFL (within functional limits)/Left UE ROM was WFL (within functional limits)/Right UE ROM was WFL (within functional limits)

## 2020-11-23 NOTE — PROGRESS NOTE ADULT - ASSESSMENT
85 yr F from home with PMH of HTN, spinal stenosis, scoliosis presents to ED with complaints of  lower abdominal pain and burning with urination since 4 days.    In ED /74, HR 95  WBC 12.37 K  UA +ve    Pt will admitted for Sepsis due to UTI

## 2020-11-23 NOTE — PHYSICAL THERAPY INITIAL EVALUATION ADULT - DIAGNOSIS, PT EVAL
Patient presents w/ generalized weakness, abdominal pain that interferes with her mobility/function.

## 2020-11-24 DIAGNOSIS — E88.09 OTHER DISORDERS OF PLASMA-PROTEIN METABOLISM, NOT ELSEWHERE CLASSIFIED: ICD-10-CM

## 2020-11-24 DIAGNOSIS — Z02.9 ENCOUNTER FOR ADMINISTRATIVE EXAMINATIONS, UNSPECIFIED: ICD-10-CM

## 2020-11-24 DIAGNOSIS — R33.9 RETENTION OF URINE, UNSPECIFIED: ICD-10-CM

## 2020-11-24 DIAGNOSIS — I48.0 PAROXYSMAL ATRIAL FIBRILLATION: ICD-10-CM

## 2020-11-24 LAB
ANION GAP SERPL CALC-SCNC: 7 MMOL/L — SIGNIFICANT CHANGE UP (ref 5–17)
BUN SERPL-MCNC: 15 MG/DL — SIGNIFICANT CHANGE UP (ref 7–18)
CALCIUM SERPL-MCNC: 8.1 MG/DL — LOW (ref 8.4–10.5)
CHLORIDE SERPL-SCNC: 108 MMOL/L — SIGNIFICANT CHANGE UP (ref 96–108)
CO2 SERPL-SCNC: 26 MMOL/L — SIGNIFICANT CHANGE UP (ref 22–31)
CREAT SERPL-MCNC: 0.75 MG/DL — SIGNIFICANT CHANGE UP (ref 0.5–1.3)
GLUCOSE BLDC GLUCOMTR-MCNC: 105 MG/DL — HIGH (ref 70–99)
GLUCOSE SERPL-MCNC: 112 MG/DL — HIGH (ref 70–99)
HCT VFR BLD CALC: 35.7 % — SIGNIFICANT CHANGE UP (ref 34.5–45)
HGB BLD-MCNC: 11.6 G/DL — SIGNIFICANT CHANGE UP (ref 11.5–15.5)
MCHC RBC-ENTMCNC: 31 PG — SIGNIFICANT CHANGE UP (ref 27–34)
MCHC RBC-ENTMCNC: 32.5 GM/DL — SIGNIFICANT CHANGE UP (ref 32–36)
MCV RBC AUTO: 95.5 FL — SIGNIFICANT CHANGE UP (ref 80–100)
NRBC # BLD: 0 /100 WBCS — SIGNIFICANT CHANGE UP (ref 0–0)
PLATELET # BLD AUTO: 178 K/UL — SIGNIFICANT CHANGE UP (ref 150–400)
POTASSIUM SERPL-MCNC: 3.9 MMOL/L — SIGNIFICANT CHANGE UP (ref 3.5–5.3)
POTASSIUM SERPL-SCNC: 3.9 MMOL/L — SIGNIFICANT CHANGE UP (ref 3.5–5.3)
PROCALCITONIN SERPL-MCNC: 0.21 NG/ML — HIGH (ref 0.02–0.1)
RBC # BLD: 3.74 M/UL — LOW (ref 3.8–5.2)
RBC # FLD: 13.4 % — SIGNIFICANT CHANGE UP (ref 10.3–14.5)
SODIUM SERPL-SCNC: 141 MMOL/L — SIGNIFICANT CHANGE UP (ref 135–145)
WBC # BLD: 10.1 K/UL — SIGNIFICANT CHANGE UP (ref 3.8–10.5)
WBC # FLD AUTO: 10.1 K/UL — SIGNIFICANT CHANGE UP (ref 3.8–10.5)

## 2020-11-24 RX ORDER — DIGOXIN 250 MCG
0.5 TABLET ORAL ONCE
Refills: 0 | Status: COMPLETED | OUTPATIENT
Start: 2020-11-24 | End: 2020-11-24

## 2020-11-24 RX ORDER — APIXABAN 2.5 MG/1
2.5 TABLET, FILM COATED ORAL
Refills: 0 | Status: DISCONTINUED | OUTPATIENT
Start: 2020-11-24 | End: 2020-12-03

## 2020-11-24 RX ORDER — PANTOPRAZOLE SODIUM 20 MG/1
40 TABLET, DELAYED RELEASE ORAL
Refills: 0 | Status: DISCONTINUED | OUTPATIENT
Start: 2020-11-24 | End: 2020-12-11

## 2020-11-24 RX ORDER — METOPROLOL TARTRATE 50 MG
12.5 TABLET ORAL EVERY 8 HOURS
Refills: 0 | Status: DISCONTINUED | OUTPATIENT
Start: 2020-11-24 | End: 2020-11-25

## 2020-11-24 RX ADMIN — NYSTATIN CREAM 1 APPLICATION(S): 100000 CREAM TOPICAL at 05:40

## 2020-11-24 RX ADMIN — AMLODIPINE BESYLATE 5 MILLIGRAM(S): 2.5 TABLET ORAL at 05:40

## 2020-11-24 RX ADMIN — Medication 0.5 MILLIGRAM(S): at 12:01

## 2020-11-24 RX ADMIN — APIXABAN 2.5 MILLIGRAM(S): 2.5 TABLET, FILM COATED ORAL at 17:41

## 2020-11-24 RX ADMIN — APIXABAN 2.5 MILLIGRAM(S): 2.5 TABLET, FILM COATED ORAL at 12:01

## 2020-11-24 RX ADMIN — NYSTATIN CREAM 1 APPLICATION(S): 100000 CREAM TOPICAL at 17:41

## 2020-11-24 RX ADMIN — Medication 12.5 MILLIGRAM(S): at 21:58

## 2020-11-24 RX ADMIN — PANTOPRAZOLE SODIUM 40 MILLIGRAM(S): 20 TABLET, DELAYED RELEASE ORAL at 16:09

## 2020-11-24 RX ADMIN — CEFTRIAXONE 100 MILLIGRAM(S): 500 INJECTION, POWDER, FOR SOLUTION INTRAMUSCULAR; INTRAVENOUS at 05:40

## 2020-11-24 RX ADMIN — Medication 81 MILLIGRAM(S): at 12:01

## 2020-11-24 RX ADMIN — Medication 12.5 MILLIGRAM(S): at 14:52

## 2020-11-24 NOTE — PROGRESS NOTE ADULT - SUBJECTIVE AND OBJECTIVE BOX
NP Note discussed with  Primary Attending    Patient is a 85y old  Female who presents with a chief complaint of abdominal pain and urinary symptoms (2020 13:34)      INTERVAL HPI/OVERNIGHT EVENTS: no new complaints    MEDICATIONS  (STANDING):  apixaban 2.5 milliGRAM(s) Oral two times a day  aspirin  chewable 81 milliGRAM(s) Oral daily  cefTRIAXone   IVPB 1000 milliGRAM(s) IV Intermittent every 24 hours  metoprolol tartrate 12.5 milliGRAM(s) Oral every 8 hours  nystatin Ointment 1 Application(s) Topical two times a day  pantoprazole    Tablet 40 milliGRAM(s) Oral before breakfast  sodium chloride 0.9%. 1000 milliLiter(s) (80 mL/Hr) IV Continuous <Continuous>    MEDICATIONS  (PRN):  acetaminophen   Tablet .. 650 milliGRAM(s) Oral every 6 hours PRN Temp greater or equal to 38C (100.4F)      __________________________________________________  REVIEW OF SYSTEMS:  comfortable    CONSTITUTIONAL: No fever,   EYES: no acute visual disturbances  NECK: No pain or stiffness  RESPIRATORY: No cough; No shortness of breath  CARDIOVASCULAR: No chest pain, no palpitations  GASTROINTESTINAL: No pain. No nausea or vomiting; No diarrhea   NEUROLOGICAL: No headache or numbness, no tremors  MUSCULOSKELETAL: No joint pain, no muscle pain  GENITOURINARY: no dysuria, no frequency, no hesitancy  PSYCHIATRY: no depression , no anxiety  ALL OTHER  ROS negative        Vital Signs Last 24 Hrs  T(C): 36.8 (2020 10:08), Max: 38.1 (2020 21:29)  T(F): 98.2 (2020 10:08), Max: 100.5 (2020 21:29)  HR: 120 (2020 10:08) (74 - 120)  BP: 104/63 (2020 10:08) (104/63 - 130/55)  BP(mean): --  RR: 17 (2020 10:08) (17 - 18)  SpO2: 95% (2020 10:08) (95% - 98%)    ________________________________________________  PHYSICAL EXAM: chronically ill appearing,   GENERAL: NAD  HEENT: Normocephalic;  conjunctivae and sclerae clear; moist mucous membranes;   NECK : supple  CHEST/LUNG: Clear to auscultation bilaterally with good air entry   HEART: S1 S2  regular; no murmurs, gallops or rubs  ABDOMEN: Soft, Nontender, Nondistended; Bowel sounds present  EXTREMITIES: no cyanosis; no edema; no calf tenderness  SKIN: warm and dry; no rash  NERVOUS SYSTEM:  Awake and alert; Oriented  to place, person and time ; no new deficits    _________________________________________________  LABS:                        11.3   11.63 )-----------( 144      ( 2020 06:02 )             34.4         142  |  110<H>  |  24<H>  ----------------------------<  100<H>  3.5   |  24  |  0.71    Ca    7.9<L>      2020 06:02  Phos  2.4       Mg     2.1         TPro  5.8<L>  /  Alb  2.1<L>  /  TBili  0.6  /  DBili  x   /  AST  40  /  ALT  27  /  AlkPhos  70        Urinalysis Basic - ( 2020 16:09 )    Color: Yellow / Appearance: Slightly Turbid / S.015 / pH: x  Gluc: x / Ketone: Negative  / Bili: Negative / Urobili: Negative   Blood: x / Protein: 100 / Nitrite: Positive   Leuk Esterase: Moderate / RBC: 5-10 /HPF / WBC >50 /HPF   Sq Epi: x / Non Sq Epi: Few /HPF / Bacteria: Moderate /HPF      CAPILLARY BLOOD GLUCOSE      POCT Blood Glucose.: 105 mg/dL (2020 11:30)    RADIOLOGY & ADDITIONAL TESTS:    Xray Chest 1 View- PORTABLE-Urgent (Xray Chest 1 View- PORTABLE-Urgent .) (11.22.20 @ 18:06) >  EXAM:  XR CHEST PORTABLE URGENT 1V                            PROCEDURE DATE:  2020          INTERPRETATION:  Chest one view    HISTORY: Sepsis    COMPARISON STUDY: None available    Frontal expiratory view of the chest shows the heart to be enlarged in size. The lungs show small left base infiltrate and there is no evidence of pneumothorax nor pleural effusion.    IMPRESSION:  Small left base infiltrate.      Thank you for the courtesy of this referral.    < end of copied text >    Urinalysis (20 @ 16:09)    Glucose Qualitative, Urine: Negative    Blood, Urine: Moderate    pH Urine: 5.0    Color: Yellow    Urine Appearance: Slightly Turbid    Bilirubin: Negative    Ketone - Urine: Negative    Specific Gravity: 1.015    Protein, Urine: 100    Urobilinogen: Negative    Nitrite: Positive    Leukocyte Esterase Concentration: Moderate    Culture - Urine (20 @ 02:01)    Specimen Source: .Urine Clean Catch (Midstream)    Culture Results:   >100,000 CFU/ml Escherichia coli      Culture - Blood (20 @ 22:16)    Specimen Source: .Blood Blood    Culture Results:   No growth to date.                Imaging Personally Reviewed:  YES  Consultant(s) Notes Reviewed:   YES  Care Discussed with Consultants : ID    Plan of care was discussed with patient and /or primary care giver; all questions and concerns were addressed and care was aligned with patient's wishes.

## 2020-11-24 NOTE — PROGRESS NOTE ADULT - ASSESSMENT
85 yr F from home with PMH of HTN, spinal stenosis, scoliosis presents to ED with complaints of  lower abdominal pain and burning with urination since 4 days. Pt reports increased urge to urinate and burning with urination beginning four days ago. Pt also complains dull lower abdominal pain when she urinates, 6/10 in severity and non-radiating. 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 Pt denies blood in urine, diarrhea, vomiting, chest pain, difficulty breathing, fevers, or recent sick contacts.    Pt with positive UA admitted for Sepsis due to UTI, found to have E. Coli on urine Cx,  blood Cx NTD.  Pt. followed by ID Dr. Pulido, on Ceftriaxone D2/7, reports great improvement in symptomatology.  Pt. was also found to have urinary retention on admission, required alfonso placement, TOV will be attempted in am.  Pt. noted with A-Fib/Flutter on tele in am, rate up to 140bpm, asymptomatic.  Card Dr. Son consulted, started Eliquis, Lopressor and Digoxin 0.5mg x 1 dose.  Pt. is currently back in SR 85bpm.

## 2020-11-24 NOTE — CONSULT NOTE ADULT - ASSESSMENT
85 yr F from home with PMH of HTN, spinal stenosis, scoliosis presents to ED with complaints of  lower abdominal pain and burning with urination since 4 days,UTI,PAF with RVR.  1.Tele monitoring.  2.UTI-ABX.  3.Echocardiogram.  4.PAF-eliquis,lopressor,dig .5mg ivx1.  5.D/C Norvasc.  6.D/C plavix,cont asa for now-pt unclear why on it.  7.PPI.
Patient is a 85y old  Female from home with PMH of HTN, spinal stenosis, scoliosis presents to ER for evaluation of  lower abdominal pain and dysuria and increased  urinary frequency. On admission, she found to have fever, tachycardia, Leukocytosis and positive Urine analysis. The CXR shows Small left base infiltrate. She has started on Ceftriaxone and the Id consult requested to assist with further evaluation and antibiotic management.     # Sepsis ( Fever + tachycardia+ Leukocytosis)  # UTI  # Possible pneumonia- on CXR  # Urinary retention - > 700 cc on bladder scan    would recommend:    1. Follow up Urine and Blood cultures  2. Agree with placing a alfonso catheter since retaining >700 ml  3. Monitor Temp. and c/w supportive care  4. Monitor WBC count  5. Continue Ceftriaxone until work up is done  6. Please obtain Procalcitonin level    d/w patient and House staff    will follow the patient with you and make further recommendation based on the clinical course and Lab results  Thank you for the opportunity to participate in Ms. Kam's care      Attending Attestation:    Spent more than 65 minutes on total encounter, more than 50 % of the visit was spent counseling and/or coordinating care by the Attending physician.

## 2020-11-24 NOTE — PROGRESS NOTE ADULT - ASSESSMENT
Patient is a 85y old  Female from home with PMH of HTN, spinal stenosis, scoliosis presents to ER for evaluation of  lower abdominal pain and dysuria and increased  urinary frequency. On admission, she found to have fever, tachycardia, Leukocytosis and positive Urine analysis. The CXR shows Small left base infiltrate. She has started on Ceftriaxone and the Id consult requested to assist with further evaluation and antibiotic management.     # Sepsis ( Fever + tachycardia+ Leukocytosis)  # UTI  # Possible pneumonia- on CXR  # Urinary retention - > 700 cc on bladder scan    would recommend:    1. Follow up final  Urine culture  2. alfonso catheter since retaining >700 ml  3. Monitor Temp. and c/w supportive care  4. Monitor WBC count  5. Continue Ceftriaxone until work up is done  6. Please obtain Procalcitonin level    d/w patient and House staff    Attending Attestation:    Spent more than 45 minutes on total encounter, more than 50 % of the visit was spent counseling and/or coordinating care by the Attending physician. Patient is a 85y old  Female from home with PMH of HTN, spinal stenosis, scoliosis presents to ER for evaluation of  lower abdominal pain and dysuria and increased  urinary frequency. On admission, she found to have fever, tachycardia, Leukocytosis and positive Urine analysis. The CXR shows Small left base infiltrate. She has started on Ceftriaxone and the Id consult requested to assist with further evaluation and antibiotic management.     # Sepsis ( Fever + tachycardia+ Leukocytosis)  # UTI  # Possible pneumonia- on CXR  # Urinary retention - > 700 cc on bladder scan- 11/23    would recommend:    1. Follow up final  Urine culture  2. Continue  alfonso catheter since retaining >700 ml  3. Monitor Temp. and c/w supportive care  4. Continue Ceftriaxone until work up is done  5. OOB to chair    d/w patient and Nursing staff    Attending Attestation:    Spent more than 45 minutes on total encounter, more than 50 % of the visit was spent counseling and/or coordinating care by the Attending physician.

## 2020-11-24 NOTE — PROGRESS NOTE ADULT - PROBLEM SELECTOR PLAN 7
Pt. lives alone.  Her  passed away due to COVID last May, she has no children or other relatives,  Pt.'s neighbor calls frequently to inform us that pt. will need assistance/placement as her home situation is unsafe due to hoarding.  PT recommended [facility]

## 2020-11-24 NOTE — CHART NOTE - NSCHARTNOTEFT_GEN_A_CORE
Pt. with rapid A-Fib/Flutter 130's-140's on tele, denies CP or SOB, states she feels her HR rapid but denies palpitations, dizziness or other discomfort.  Pt. is alert and oriented, states was never told she had A-Fib.  Attending notified, card Dr. Son consulted.  Will wait for recommendations, pt. in no acute distress.

## 2020-11-24 NOTE — PROGRESS NOTE ADULT - ASSESSMENT
85 yr F from home with PMH of HTN, spinal stenosis, scoliosis presents to ED with complaints of  lower abdominal pain and burning with urination since 4 days.    In ED /74, HR 95  WBC 12.37 K  UA +ve    Pt will admitted for Sepsis due to UTI -resolving

## 2020-11-24 NOTE — CONSULT NOTE ADULT - SUBJECTIVE AND OBJECTIVE BOX
CHIEF COMPLAINT:Patient is a 85y old  Female who presents with a chief complaint of abdominal pain and urinary symptoms.      HPI:  85 yr F from home with PMH of HTN, spinal stenosis, scoliosis presents to ED with complaints of  lower abdominal pain and burning with urination since 4 days. Pt reports increased urge to urinate and burning with urination beginning four days ago. Pt also complains dull lower abdominal pain when she urinates, 6/10 in severity and non-radiating. 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 Pt denies blood in urine, diarrhea, vomiting, chest pain, difficulty breathing, fevers, or recent sick contacts.Pt with UTI on admission, nw found to have PAF with RVR on tele.          PAST MEDICAL & SURGICAL HISTORY:  Spinal stenosis, unspecified spinal region    Scoliosis, unspecified scoliosis type, unspecified spinal region    Cataract of both eyes, unspecified cataract type        MEDICATIONS  (STANDING):  apixaban 2.5 milliGRAM(s) Oral two times a day  aspirin  chewable 81 milliGRAM(s) Oral daily  cefTRIAXone   IVPB 1000 milliGRAM(s) IV Intermittent every 24 hours  digoxin  Injectable 0.5 milliGRAM(s) IV Push once  metoprolol tartrate 12.5 milliGRAM(s) Oral every 8 hours  nystatin Ointment 1 Application(s) Topical two times a day  pantoprazole    Tablet 40 milliGRAM(s) Oral before breakfast  sodium chloride 0.9%. 1000 milliLiter(s) (80 mL/Hr) IV Continuous <Continuous>    MEDICATIONS  (PRN):  acetaminophen   Tablet .. 650 milliGRAM(s) Oral every 6 hours PRN Temp greater or equal to 38C (100.4F)      FAMILY HISTORY:  No pertinent family history in first degree relatives        SOCIAL HISTORY:    [x ] Non-smoker    [x ] Alcohol-denies    Allergies    No Known Allergies    Intolerances    	    REVIEW OF SYSTEMS:  CONSTITUTIONAL: No fever, weight loss, or fatigue  EYES: No eye pain, visual disturbances, or discharge  ENT:  No difficulty hearing, tinnitus, vertigo; No sinus or throat pain  NECK: No pain or stiffness  RESPIRATORY: No cough, wheezing, chills or hemoptysis; No Shortness of Breath  CARDIOVASCULAR: No chest pain, palpitations, passing out, dizziness, or leg swelling  GASTROINTESTINAL: No abdominal or epigastric pain. No nausea, vomiting, or hematemesis; No diarrhea or constipation. No melena or hematochezia.  GENITOURINARY: No dysuria, frequency, hematuria, or incontinence  NEUROLOGICAL: No headaches, memory loss, loss of strength, numbness, or tremors  SKIN: No itching, burning, rashes, or lesions   LYMPH Nodes: No enlarged glands  ENDOCRINE: No heat or cold intolerance; No hair loss  MUSCULOSKELETAL: No joint pain or swelling; No muscle, back, or extremity pain  PSYCHIATRIC: No depression, anxiety, mood swings, or difficulty sleeping  HEME/LYMPH: No easy bruising, or bleeding gums  ALLERGY AND IMMUNOLOGIC: No hives or eczema	        PHYSICAL EXAM:  T(C): 36.8 (11-24-20 @ 10:08), Max: 38.1 (11-23-20 @ 21:29)  HR: 120 (11-24-20 @ 10:08) (74 - 120)  BP: 104/63 (11-24-20 @ 10:08) (104/63 - 130/55)  RR: 17 (11-24-20 @ 10:08) (17 - 18)  SpO2: 95% (11-24-20 @ 10:08) (95% - 98%)  Wt(kg): --  I&O's Summary    23 Nov 2020 07:01  -  24 Nov 2020 07:00  --------------------------------------------------------  IN: 0 mL / OUT: 600 mL / NET: -600 mL        Appearance: Normal	  HEENT:   Normal oral mucosa, PERRL, EOMI	  Lymphatic: No lymphadenopathy  Cardiovascular: Normal S1 S2, No JVD, No murmurs, No edema  Respiratory: Lungs clear to auscultation	  Psychiatry: A & O x 3, Mood & affect appropriate  Gastrointestinal:  Soft, Non-tender, + BS	  Skin: No rashes, No ecchymoses, No cyanosis	  Neurologic: Non-focal  Extremities: Normal range of motion, No clubbing, cyanosis or edema  Vascular: Peripheral pulses palpable 2+ bilaterally    TELEMETRY: PAF with rvr	      ECG:   Normal sinus rhythm  Cannot rule out Anterior infarct , age undetermined        	  LABS:	 	                       11.3   11.63 )-----------( 144      ( 23 Nov 2020 06:02 )             34.4     11-23    142  |  110<H>  |  24<H>  ----------------------------<  100<H>  3.5   |  24  |  0.71    Ca    7.9<L>      23 Nov 2020 06:02  Phos  2.4     11-23  Mg     2.1     11-23    TPro  5.8<L>  /  Alb  2.1<L>  /  TBili  0.6  /  DBili  x   /  AST  40  /  ALT  27  /  AlkPhos  70  11-23    tsThyroid Stimulating Hormone, Serum (11.23.20 @ 06:02)   Thyroid Stimulating Hormone, Serum: 1.90 uU/mL

## 2020-11-25 LAB
-  AMIKACIN: SIGNIFICANT CHANGE UP
-  AMOXICILLIN/CLAVULANIC ACID: SIGNIFICANT CHANGE UP
-  AMPICILLIN/SULBACTAM: SIGNIFICANT CHANGE UP
-  AMPICILLIN: SIGNIFICANT CHANGE UP
-  AZTREONAM: SIGNIFICANT CHANGE UP
-  CEFAZOLIN: SIGNIFICANT CHANGE UP
-  CEFEPIME: SIGNIFICANT CHANGE UP
-  CEFOXITIN: SIGNIFICANT CHANGE UP
-  CEFTRIAXONE: SIGNIFICANT CHANGE UP
-  CIPROFLOXACIN: SIGNIFICANT CHANGE UP
-  ERTAPENEM: SIGNIFICANT CHANGE UP
-  GENTAMICIN: SIGNIFICANT CHANGE UP
-  IMIPENEM: SIGNIFICANT CHANGE UP
-  LEVOFLOXACIN: SIGNIFICANT CHANGE UP
-  MEROPENEM: SIGNIFICANT CHANGE UP
-  NITROFURANTOIN: SIGNIFICANT CHANGE UP
-  PIPERACILLIN/TAZOBACTAM: SIGNIFICANT CHANGE UP
-  TIGECYCLINE: SIGNIFICANT CHANGE UP
-  TOBRAMYCIN: SIGNIFICANT CHANGE UP
-  TRIMETHOPRIM/SULFAMETHOXAZOLE: SIGNIFICANT CHANGE UP
ANION GAP SERPL CALC-SCNC: 9 MMOL/L — SIGNIFICANT CHANGE UP (ref 5–17)
BUN SERPL-MCNC: 10 MG/DL — SIGNIFICANT CHANGE UP (ref 7–18)
CALCIUM SERPL-MCNC: 8.5 MG/DL — SIGNIFICANT CHANGE UP (ref 8.4–10.5)
CHLORIDE SERPL-SCNC: 105 MMOL/L — SIGNIFICANT CHANGE UP (ref 96–108)
CO2 SERPL-SCNC: 26 MMOL/L — SIGNIFICANT CHANGE UP (ref 22–31)
CREAT SERPL-MCNC: 0.64 MG/DL — SIGNIFICANT CHANGE UP (ref 0.5–1.3)
CULTURE RESULTS: SIGNIFICANT CHANGE UP
GLUCOSE SERPL-MCNC: 97 MG/DL — SIGNIFICANT CHANGE UP (ref 70–99)
HCT VFR BLD CALC: 37 % — SIGNIFICANT CHANGE UP (ref 34.5–45)
HGB BLD-MCNC: 12.2 G/DL — SIGNIFICANT CHANGE UP (ref 11.5–15.5)
MAGNESIUM SERPL-MCNC: 2.1 MG/DL — SIGNIFICANT CHANGE UP (ref 1.6–2.6)
MCHC RBC-ENTMCNC: 31 PG — SIGNIFICANT CHANGE UP (ref 27–34)
MCHC RBC-ENTMCNC: 33 GM/DL — SIGNIFICANT CHANGE UP (ref 32–36)
MCV RBC AUTO: 93.9 FL — SIGNIFICANT CHANGE UP (ref 80–100)
METHOD TYPE: SIGNIFICANT CHANGE UP
NRBC # BLD: 0 /100 WBCS — SIGNIFICANT CHANGE UP (ref 0–0)
ORGANISM # SPEC MICROSCOPIC CNT: SIGNIFICANT CHANGE UP
ORGANISM # SPEC MICROSCOPIC CNT: SIGNIFICANT CHANGE UP
PHOSPHATE SERPL-MCNC: 2.2 MG/DL — LOW (ref 2.5–4.5)
PLATELET # BLD AUTO: 211 K/UL — SIGNIFICANT CHANGE UP (ref 150–400)
POTASSIUM SERPL-MCNC: 3.7 MMOL/L — SIGNIFICANT CHANGE UP (ref 3.5–5.3)
POTASSIUM SERPL-SCNC: 3.7 MMOL/L — SIGNIFICANT CHANGE UP (ref 3.5–5.3)
RBC # BLD: 3.94 M/UL — SIGNIFICANT CHANGE UP (ref 3.8–5.2)
RBC # FLD: 13.3 % — SIGNIFICANT CHANGE UP (ref 10.3–14.5)
SODIUM SERPL-SCNC: 140 MMOL/L — SIGNIFICANT CHANGE UP (ref 135–145)
SPECIMEN SOURCE: SIGNIFICANT CHANGE UP
WBC # BLD: 10.46 K/UL — SIGNIFICANT CHANGE UP (ref 3.8–10.5)
WBC # FLD AUTO: 10.46 K/UL — SIGNIFICANT CHANGE UP (ref 3.8–10.5)

## 2020-11-25 RX ORDER — SODIUM,POTASSIUM PHOSPHATES 278-250MG
1 POWDER IN PACKET (EA) ORAL
Refills: 0 | Status: COMPLETED | OUTPATIENT
Start: 2020-11-25 | End: 2020-11-25

## 2020-11-25 RX ORDER — METOPROLOL TARTRATE 50 MG
25 TABLET ORAL
Refills: 0 | Status: DISCONTINUED | OUTPATIENT
Start: 2020-11-25 | End: 2020-12-11

## 2020-11-25 RX ORDER — DIGOXIN 250 MCG
0.12 TABLET ORAL DAILY
Refills: 0 | Status: DISCONTINUED | OUTPATIENT
Start: 2020-11-25 | End: 2020-12-11

## 2020-11-25 RX ADMIN — NYSTATIN CREAM 1 APPLICATION(S): 100000 CREAM TOPICAL at 05:57

## 2020-11-25 RX ADMIN — APIXABAN 2.5 MILLIGRAM(S): 2.5 TABLET, FILM COATED ORAL at 17:30

## 2020-11-25 RX ADMIN — NYSTATIN CREAM 1 APPLICATION(S): 100000 CREAM TOPICAL at 17:30

## 2020-11-25 RX ADMIN — Medication 1 PACKET(S): at 12:26

## 2020-11-25 RX ADMIN — Medication 25 MILLIGRAM(S): at 17:30

## 2020-11-25 RX ADMIN — APIXABAN 2.5 MILLIGRAM(S): 2.5 TABLET, FILM COATED ORAL at 05:56

## 2020-11-25 RX ADMIN — PANTOPRAZOLE SODIUM 40 MILLIGRAM(S): 20 TABLET, DELAYED RELEASE ORAL at 05:56

## 2020-11-25 RX ADMIN — CEFTRIAXONE 100 MILLIGRAM(S): 500 INJECTION, POWDER, FOR SOLUTION INTRAMUSCULAR; INTRAVENOUS at 05:56

## 2020-11-25 RX ADMIN — Medication 1 PACKET(S): at 17:30

## 2020-11-25 RX ADMIN — Medication 0.12 MILLIGRAM(S): at 13:08

## 2020-11-25 RX ADMIN — Medication 12.5 MILLIGRAM(S): at 05:56

## 2020-11-25 NOTE — PROGRESS NOTE ADULT - SUBJECTIVE AND OBJECTIVE BOX
NP Note discussed with  Primary Attending    Patient is a 85y old  Female who presents with a chief complaint of abdominal pain and urinary symptoms (25 Nov 2020 13:27)      INTERVAL HPI/OVERNIGHT EVENTS: no new complaints    MEDICATIONS  (STANDING):  apixaban 2.5 milliGRAM(s) Oral two times a day  cefTRIAXone   IVPB 1000 milliGRAM(s) IV Intermittent every 24 hours  digoxin     Tablet 0.125 milliGRAM(s) Oral daily  metoprolol tartrate 25 milliGRAM(s) Oral two times a day  nystatin Ointment 1 Application(s) Topical two times a day  pantoprazole    Tablet 40 milliGRAM(s) Oral before breakfast  potassium phosphate / sodium phosphate Powder (PHOS-NaK) 1 Packet(s) Oral four times a day before meals  sodium chloride 0.9%. 1000 milliLiter(s) (80 mL/Hr) IV Continuous <Continuous>    MEDICATIONS  (PRN):  acetaminophen   Tablet .. 650 milliGRAM(s) Oral every 6 hours PRN Temp greater or equal to 38C (100.4F)      __________________________________________________  REVIEW OF SYSTEMS:    CONSTITUTIONAL: No fever,   EYES: no acute visual disturbances  NECK: No pain or stiffness  RESPIRATORY: No cough; No shortness of breath  CARDIOVASCULAR: No chest pain, no palpitations  GASTROINTESTINAL: No pain. No nausea or vomiting; No diarrhea   NEUROLOGICAL: No headache or numbness, no tremors  MUSCULOSKELETAL: No joint pain, no muscle pain  GENITOURINARY: no dysuria, no frequency, no hesitancy  PSYCHIATRY: no depression , no anxiety  ALL OTHER  ROS negative        Vital Signs Last 24 Hrs  T(C): 37.1 (25 Nov 2020 10:30), Max: 37.3 (24 Nov 2020 14:09)  T(F): 98.8 (25 Nov 2020 10:30), Max: 99.1 (24 Nov 2020 14:09)  HR: 90 (25 Nov 2020 10:30) (86 - 90)  BP: 115/62 (25 Nov 2020 10:30) (115/62 - 129/54)  BP(mean): --  RR: 18 (25 Nov 2020 10:30) (17 - 18)  SpO2: 100% (25 Nov 2020 10:30) (95% - 100%)    ________________________________________________  PHYSICAL EXAM:  comfortable  GENERAL: NAD  HEENT: Normocephalic;  conjunctivae and sclerae clear; moist mucous membranes;   NECK : supple  CHEST/LUNG: Clear to auscultation bilaterally with good air entry   HEART: S1 S2  regular; no murmurs, gallops or rubs  ABDOMEN: Soft, Nontender, Nondistended; Bowel sounds present  EXTREMITIES: no cyanosis; no edema; no calf tenderness  SKIN: warm and dry; no rash  NERVOUS SYSTEM:  Awake and alert; Oriented  to place, person and time ; no new deficits    _________________________________________________  LABS:                        12.2   10.46 )-----------( 211      ( 25 Nov 2020 06:49 )             37.0     11-25    140  |  105  |  10  ----------------------------<  97  3.7   |  26  |  0.64    Ca    8.5      25 Nov 2020 06:49  Phos  2.2     11-25  Mg     2.1     11-25          CAPILLARY BLOOD GLUCOSE    RADIOLOGY & ADDITIONAL TESTS:    Xray Chest 1 View- PORTABLE-Urgent (Xray Chest 1 View- PORTABLE-Urgent .) (11.22.20 @ 18:06) >  EXAM:  XR CHEST PORTABLE URGENT 1V                            PROCEDURE DATE:  11/22/2020          INTERPRETATION:  Chest one view    HISTORY: Sepsis    COMPARISON STUDY: None available    Frontal expiratory view of the chest shows the heart to be enlarged in size. The lungs show small left base infiltrate and there is no evidence of pneumothorax nor pleural effusion.    IMPRESSION:  Small left base infiltrate.      Thank you for the courtesy of this referral.    < end of copied text >      Transthoracic Echocardiogram (11.24.20 @ 11:04) >    Patient name: JOSE GARDNER  YOB: 1935   Age: 85 (F)   MR#: [id number]  Study Date: 11/24/2020  Location: [address] [place], [place] [id number]: Bindu Arana [facility]  Study quality: Technically good  Referring Physician:  HANANE BASURTO MD  Blood Pressure: 104/63 mmHg  Height: 160 cm  Weight: 57 kg  BSA: 1.6 m2  ------------------------------------------------------------------------    PROCEDURE: Transthoracic echocardiogram with 2-D, M-Mode  and complete spectral and color flow Doppler.  INDICATION: Sepsis, unspecified organism (A41.9), Abnormal  electrocardiogram [ECG] [EKG] (R94.31)  HISTORY:  ------------------------------------------------------------------------  DIMENSIONS:  Dimensions:     Normal Values:  LA:     3.4 cm    2.0 - 4.0 cm  Ao:     2.8 cm    2.0 - 3.8 cm  SEPTUM: 0.7 cm    0.6 - 1.2 cm  PWT:    0.7 cm    0.6 - 1.1 cm  LVIDd:  4.4 cm    3.0 - 5.6 cm  LVIDs:  3.1 cm    1.8 - 4.0 cm      Derived Variables:  LVMI: 58 g/m2  RWT: 0.31  Ejection Fraction Visual Estimate: 55-60 %  Ejection Fraction Caruso: 75 %    ------------------------------------------------------------------------  OBSERVATIONS:  Mitral Valve: Normal mitral valve.  Aortic Root: Aortic Root: 2.8 cm.    Aortic Valve: Normal trileaflet aortic valve.  Left Atrium: LA volume index = 15 cc/m2.  Left Ventricle: Normal Left Ventricular Systolic Function,  (EF = 55 to 60%) Normal left ventricular internal  dimensions and wall thicknesses. Normal diastolic function.  Right Heart: Normal right atrium. Normal right ventricular  size and systolic function (TAPSE 2.7 cm). Normal tricuspid  valve. Normal pulmonic valve.  Pericardium/PleuraNormal pericardium with no pericardial  effusion.  Hemodynamic: RV systolic pressure is normal at  20 mm Hg.  ------------------------------------------------------------------------  CONCLUSIONS:  1. Normal mitral valve.  2. Normal trileaflet aortic valve.  3. Aortic Root: 2.8 cm.    4. LA volume index = 15 cc/m2.  5. Normal left ventricular internal dimensions and wall  thicknesses.  6. Normal Left Ventricular Systolic Function,  (EF = 55 to  60%)  7. Normal diastolic function.  8. Normal right atrium.  9. Normal right ventricular size and systolic function  (TAPSE 2.7 cm).  10. RV systolic pressure is normalat  20 mm Hg.  11. Normal tricuspid valve.  12. Normal pulmonic valve.  13. Normal pericardium with no pericardial effusion.    < end of copied text >  Culture - Urine (11.23.20 @ 02:01)    -  Amikacin: S <=16    -  Amoxicillin/Clavulanic Acid: S <=8/4    -  Ampicillin: S <=8 These ampicillin results predict results for amoxicillin    -  Ampicillin/Sulbactam: S <=4/2 Enterobacter, Citrobacter, and Serratia may develop resistance during prolonged therapy (3-4 days)    -  Aztreonam: S <=4    -  Cefazolin: S <=2 (MIC_CL_COM_ENTERIC_CEFAZU) For uncomplicated UTI with K. pneumoniae, E. coli, or P. mirablis: YULY <=16 is sensitive and YULY >=32 is resistant. This also predicts results for oral agents cefaclor, cefdinir, cefpodoxime, cefprozil, cefuroxime axetil, cephalexin and locarbef for uncomplicated UTI. Note that some isolates may be susceptible to these agents while testing resistant to cefazolin.    -  Cefepime: S <=2    -  Cefoxitin: S <=8    -  Ceftriaxone: S <=1 Enterobacter, Citrobacter, and Serratia may develop resistance during prolonged therapy    -  Ciprofloxacin: S <=0.25    -  Ertapenem: S <=0.5    -  Gentamicin: S <=2    -  Imipenem: S <=1    -  Levofloxacin: S <=0.5    -  Meropenem: S <=1    -  Nitrofurantoin: S <=32 Should not be used to treat pyelonephritis    -  Piperacillin/Tazobactam: S <=8    -  Tigecycline: S <=2    -  Tobramycin: S <=2    -  Trimethoprim/Sulfamethoxazole: S <=0.5/9.5    Specimen Source: .Urine Clean Catch (Midstream)    Culture Results:   >100,000 CFU/ml Escherichia coli    Organism Identification: Escherichia coli    Organism: Escherichia coli    Method Type: YULY      Culture - Blood (11.22.20 @ 22:16)    Specimen Source: .Blood Blood    Culture Results:   No growth to date.      Imaging Personally Reviewed:  YES  Consultant(s) Notes Reviewed:   YES  Care Discussed with Consultants : card, ID    Plan of care was discussed with patient and /or primary care giver; all questions and concerns were addressed and care was aligned with patient's wishes.

## 2020-11-25 NOTE — PROGRESS NOTE ADULT - PROBLEM SELECTOR PLAN 7
Pt. lives alone.  Her  passed away due to COVID last May, she has no children or other relatives,  Pt.'s neighbor calls frequently to inform us that pt. will need assistance/placement as her home situation is unsafe due to hoarding.  PT recommended [facility]  Placement in [facility] in progress  COVID19 ordered for 11/26 am

## 2020-11-25 NOTE — DIETITIAN INITIAL EVALUATION ADULT. - PROBLEM SELECTOR PLAN 1
p/w lower abdominal pain and urinary symptoms  /74, HR 95, WBC 12.37 K  Urinalysis +ve   Sepsis likely secondary to UTI  s/p Ceftriaxone and 1700 cc in ED  Started on ceftriaxone  on iv fluids  f/u Urine Cx and Blood Cx  ID Dr. Pulido

## 2020-11-25 NOTE — PROGRESS NOTE ADULT - SUBJECTIVE AND OBJECTIVE BOX
Patient is seen and examined at the bed side, is afebrile now, spiked fever last night. She mentioned feeling better, after placing the alfonso catheter.  The Blood cultures have no growth to date and urine culture grew E,.coli.        REVIEW OF SYSTEMS: All other review systems are negative      ALLERGIES: No Known Allergies      Vital Signs Last 24 Hrs  T(C): 36.8 (2020 14:48), Max: 37.2 (2020 05:13)  T(F): 98.3 (2020 14:48), Max: 98.9 (2020 05:13)  HR: 95 (2020 14:48) (86 - 95)  BP: 92/56 (2020 14:48) (92/56 - 124/68)  BP(mean): --  RR: 17 (2020 14:48) (17 - 18)  SpO2: 98% (2020 14:48) (95% - 100%)      PHYSICAL EXAM:  GENERAL: Not in distress   CHEST/LUNG: Not using accessory muscles   HEART: s1 and s2 present  ABDOMEN: Lower abdominal tenderness resolved  : Alfonso catheter in placed   EXTREMITIES: No pedal  edema  CNS: Awake and Alert      LABS:                        12.2   10.46 )-----------( 211      ( 2020 06:49 )             37.0                           11.6   10.10 )-----------( 178      ( 2020 14:36 )             35.7                           11.3   11.63 )-----------( 144      ( 2020 06:02 )             34.4         11-25    140  |  105  |  10  ----------------------------<  97  3.7   |  26  |  0.64    Ca    8.5      2020 06:49  Phos  2.2     11-25  Mg     2.1     11-25      11-24    141  |  108  |  15  ----------------------------<  112<H>  3.9   |  26  |  0.75    Ca    8.1<L>      2020 14:36  Phos  2.4     11-23  Mg     2.1     11-23    TPro  5.8<L>  /  Alb  2.1<L>  /  TBili  0.6  /  DBili  x   /  AST  40  /  ALT  27  /  AlkPhos  70      142  |  110<H>  |  24<H>  ----------------------------<  100<H>  3.5   |  24  |  0.71    Ca    7.9<L>      2020 06:02  Phos  2.4       Mg     2.1         TPro  5.8<L>  /  Alb  2.1<L>  /  TBili  0.6  /  DBili  x   /  AST  40  /  ALT  27  /  AlkPhos  70          Urinalysis Basic - ( 2020 16:09 )  Color: Yellow / Appearance: Slightly Turbid / S.015 / pH: x  Gluc: x / Ketone: Negative  / Bili: Negative / Urobili: Negative   Blood: x / Protein: 100 / Nitrite: Positive   Leuk Esterase: Moderate / RBC: 5-10 /HPF / WBC >50 /HPF   Sq Epi: x / Non Sq Epi: Few /HPF / Bacteria: Moderate /HPF        MEDICATIONS  (STANDING):    apixaban 2.5 milliGRAM(s) Oral two times a day  cefTRIAXone   IVPB 1000 milliGRAM(s) IV Intermittent every 24 hours  digoxin     Tablet 0.125 milliGRAM(s) Oral daily  metoprolol tartrate 25 milliGRAM(s) Oral two times a day  nystatin Ointment 1 Application(s) Topical two times a day  pantoprazole    Tablet 40 milliGRAM(s) Oral before breakfast  potassium phosphate / sodium phosphate Powder (PHOS-NaK) 1 Packet(s) Oral four times a day before meals  sodium chloride 0.9%. 1000 milliLiter(s) (80 mL/Hr) IV Continuous <Continuous>      RADIOLOGY & ADDITIONAL TESTS:    20 : Xray Chest 1 View- PORTABLE-Urgent (Xray Chest 1 View- PORTABLE-Urgent .) (20 @ 18:06) Small left base infiltrate.      MICROBIOLOGY DATA:    Culture - Urine (20 @ 02:01)   Specimen Source: .Urine Clean Catch (Midstream)   Culture Results: >100,000 CFU/ml Escherichia coli     Culture - Blood (11.22.20 @ 22:16)   Specimen Source: .Blood Blood   Culture Results:  No growth to date.     Culture - Blood (20 @ 22:16)   Specimen Source: .Blood Blood   Culture Results: No growth to date.     Respiratory Viral Panel with COVID-19 by [facility] (20 @ 16:46)   Rapid RVP Result: NotDete   SARS-CoV-2: NotDete:            Patient is seen and examined at the bed side, is afebrile today. She mentioned feeling better, and s/p removal of alfonso catheter and passed  TOV.          REVIEW OF SYSTEMS: All other review systems are negative      ALLERGIES: No Known Allergies      Vital Signs Last 24 Hrs  T(C): 36.8 (2020 14:48), Max: 37.2 (2020 05:13)  T(F): 98.3 (2020 14:48), Max: 98.9 (2020 05:13)  HR: 95 (2020 14:48) (86 - 95)  BP: 92/56 (2020 14:48) (92/56 - 124/68)  BP(mean): --  RR: 17 (2020 14:48) (17 - 18)  SpO2: 98% (2020 14:48) (95% - 100%)      PHYSICAL EXAM:  GENERAL: Not in distress   CHEST/LUNG: Not using accessory muscles   HEART: s1 and s2 present  ABDOMEN: Lower abdominal tenderness resolved  : Alfonso catheter in placed   EXTREMITIES: No pedal  edema  CNS: Awake and Alert      LABS:                        12.2   10.46 )-----------( 211      ( 2020 06:49 )             37.0                           11.6   10.10 )-----------( 178      ( 2020 14:36 )             35.7                           11.3   11.63 )-----------( 144      ( 2020 06:02 )             34.4         11-    140  |  105  |  10  ----------------------------<  97  3.7   |  26  |  0.64    Ca    8.5      2020 06:49  Phos  2.2     11-25  Mg     2.1     11-25      11-24    141  |  108  |  15  ----------------------------<  112<H>  3.9   |  26  |  0.75    Ca    8.1<L>      2020 14:36  Phos  2.4     11-23  Mg     2.1     11-23    TPro  5.8<L>  /  Alb  2.1<L>  /  TBili  0.6  /  DBili  x   /  AST  40  /  ALT  27  /  AlkPhos  70  11-23      Urinalysis Basic - ( 2020 16:09 )  Color: Yellow / Appearance: Slightly Turbid / S.015 / pH: x  Gluc: x / Ketone: Negative  / Bili: Negative / Urobili: Negative   Blood: x / Protein: 100 / Nitrite: Positive   Leuk Esterase: Moderate / RBC: 5-10 /HPF / WBC >50 /HPF   Sq Epi: x / Non Sq Epi: Few /HPF / Bacteria: Moderate /HPF        MEDICATIONS  (STANDING):    apixaban 2.5 milliGRAM(s) Oral two times a day  cefTRIAXone   IVPB 1000 milliGRAM(s) IV Intermittent every 24 hours  digoxin     Tablet 0.125 milliGRAM(s) Oral daily  metoprolol tartrate 25 milliGRAM(s) Oral two times a day  nystatin Ointment 1 Application(s) Topical two times a day  pantoprazole    Tablet 40 milliGRAM(s) Oral before breakfast  potassium phosphate / sodium phosphate Powder (PHOS-NaK) 1 Packet(s) Oral four times a day before meals  sodium chloride 0.9%. 1000 milliLiter(s) (80 mL/Hr) IV Continuous <Continuous>      RADIOLOGY & ADDITIONAL TESTS:    20 : Xray Chest 1 View- PORTABLE-Urgent (Xray Chest 1 View- PORTABLE-Urgent .) (20 @ 18:06) Small left base infiltrate.        MICROBIOLOGY DATA:    Culture - Urine (20 @ 02:01)   - Amikacin: S <=16   - Amoxicillin/Clavulanic Acid: S <=8/4   - Ampicillin: S <=8 These ampicillin results predict results for amoxicillin   - Ampicillin/Sulbactam: S <=4/2 Enterobacter, Citrobacter, and Serratia may develop resistance during prolonged therapy (3-4 days)   - Aztreonam: S <=4   - Cefazolin: S <=2 (MIC_CL_COM_ENTERIC_CEFAZU) For uncomplicated UTI with K. pneumoniae, E. coli, or P. mirablis: YULY <=16 is sensitive and YULY >=32 is resistant. This also predicts results for oral agents cefaclor, cefdinir, cefpodoxime, cefprozil, cefuroxime axetil, cephalexin and locarbef for uncomplicated UTI. Note that some isolates may be susceptible to these agents while testing resistant to cefazolin.   - Cefepime: S <=2   - Cefoxitin: S <=8   - Ceftriaxone: S <=1 Enterobacter, Citrobacter, and Serratia may develop resistance during prolonged therapy   - Ciprofloxacin: S <=0.25   - Ertapenem: S <=0.5   - Gentamicin: S <=2   - Imipenem: S <=1   - Levofloxacin: S <=0.5   - Meropenem: S <=1   - Nitrofurantoin: S <=32 Should not be used to treat pyelonephritis   - Piperacillin/Tazobactam: S <=8   - Tigecycline: S <=2   - Tobramycin: S <=2   - Trimethoprim/Sulfamethoxazole: S <=0.5/9.5   Specimen Source: .Urine Clean Catch (Midstream)   Culture Results:   >100,000 CFU/ml Escherichia coli   Culture - Urine (20 @ 02:01)   Specimen Source: .Urine Clean Catch (Midstream)   Culture Results: >100,000 CFU/ml Escherichia coli     Culture - Blood (20 @ 22:16)   Specimen Source: .Blood Blood   Culture Results:  No growth to date.     Culture - Blood (20 @ 22:16)   Specimen Source: .Blood Blood   Culture Results: No growth to date.     Respiratory Viral Panel with COVID-19 by [facility] (20 @ 16:46)   Rapid RVP Result: Chelseate   SARS-CoV-2: NotDete:

## 2020-11-25 NOTE — DIETITIAN INITIAL EVALUATION ADULT. - PERTINENT MEDS FT
MEDICATIONS  (STANDING):  apixaban 2.5 milliGRAM(s) Oral two times a day  cefTRIAXone   IVPB 1000 milliGRAM(s) IV Intermittent every 24 hours  digoxin     Tablet 0.125 milliGRAM(s) Oral daily  metoprolol tartrate 25 milliGRAM(s) Oral two times a day  nystatin Ointment 1 Application(s) Topical two times a day  pantoprazole    Tablet 40 milliGRAM(s) Oral before breakfast  potassium phosphate / sodium phosphate Powder (PHOS-NaK) 1 Packet(s) Oral four times a day before meals  sodium chloride 0.9%. 1000 milliLiter(s) (80 mL/Hr) IV Continuous <Continuous>    MEDICATIONS  (PRN):  acetaminophen   Tablet .. 650 milliGRAM(s) Oral every 6 hours PRN Temp greater or equal to 38C (100.4F)

## 2020-11-25 NOTE — CHART NOTE - NSCHARTNOTEFT_GEN_A_CORE
Pt. was supposed to discharge to [facility] on  as discussed with SW however pt. refused COVID19 swab offered to her in preparation for discharge.  Pt. states her friend, former nurse, advised her not to go to rehab stating that pt.'s   because he was in rehab.  Pt. states "I'm terrified and don't want to go to rehab".  Repeated explanations offered to pt., her unsafe situation at home discussed.  Pt. still declines [facility].

## 2020-11-25 NOTE — PROGRESS NOTE ADULT - ASSESSMENT
85 yr F from home with PMH of HTN, spinal stenosis, scoliosis presents to ED with complaints of  lower abdominal pain and burning with urination since 4 days. Pt reports increased urge to urinate and burning with urination beginning four days ago. Pt also complains dull lower abdominal pain when she urinates, 6/10 in severity and non-radiating. 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 Pt denies blood in urine, diarrhea, vomiting, chest pain, difficulty breathing, fevers, or recent sick contacts.    Pt with positive UA admitted for Sepsis due to UTI, found to have E. Coli on urine Cx,  blood Cx NTD.  Pt. followed by ID Dr. Pulido, on Ceftriaxone D3/7, reports great improvement in symptomatology.  Pt. was also found to have urinary retention on admission, required alfonso placement, alfonso removed in am, voiding with no difficulty.  Pt. noted with A-Fib/Flutter on tele on 11/24 rate up to 140bpm, asymptomatic.  Currently NSR on Digoxin, Lopressor and Eliquis, Card Dr. Son.  No further episodes of AF on tele.  ECHO normal with EF 55-60%.  Pt. is now agreeable to discharge to [facility] per PT recommendation.  CM trying to get her to [place], likely Friday.

## 2020-11-25 NOTE — PROGRESS NOTE ADULT - ASSESSMENT
85 yr F from home with PMH of HTN, spinal stenosis, scoliosis presents to ED with complaints of  lower abdominal pain and burning with urination since 4 days,UTI,PAF with RVR.  1.Tele monitoring.  2.UTI-ABX.  3.PAF-eliquis,lopressor,dig .125mg qd.  4.D/C asa.  5.Urinary retention-TOV.  6.PPI.

## 2020-11-25 NOTE — CHART NOTE - NSCHARTNOTEFT_GEN_A_CORE
Informed by CM that pt. will be discharged to [facility] tomorrow 11/26 at 14:00.  COVID19 ordered

## 2020-11-25 NOTE — PROGRESS NOTE ADULT - SUBJECTIVE AND OBJECTIVE BOX
CHIEF COMPLAINT:Patient is a 85y old  Female who presents with a chief complaint of abdominal pain and urinary symptoms.Pt appears comfortable.    	  REVIEW OF SYSTEMS:  CONSTITUTIONAL: No fever, weight loss, or fatigue  EYES: No eye pain, visual disturbances, or discharge  ENT:  No difficulty hearing, tinnitus, vertigo; No sinus or throat pain  NECK: No pain or stiffness  RESPIRATORY: No cough, wheezing, chills or hemoptysis; No Shortness of Breath  CARDIOVASCULAR: No chest pain, palpitations, passing out, dizziness, or leg swelling  GASTROINTESTINAL: No abdominal or epigastric pain. No nausea, vomiting, or hematemesis; No diarrhea or constipation. No melena or hematochezia.  GENITOURINARY: No dysuria, frequency, hematuria, or incontinence  NEUROLOGICAL: No headaches, memory loss, loss of strength, numbness, or tremors  SKIN: No itching, burning, rashes, or lesions   LYMPH Nodes: No enlarged glands  ENDOCRINE: No heat or cold intolerance; No hair loss  MUSCULOSKELETAL: No joint pain or swelling; No muscle, back, or extremity pain  PSYCHIATRIC: No depression, anxiety, mood swings, or difficulty sleeping  HEME/LYMPH: No easy bruising, or bleeding gums  ALLERGY AND IMMUNOLOGIC: No hives or eczema	        PHYSICAL EXAM:  T(C): 37.2 (11-25-20 @ 05:13), Max: 37.3 (11-24-20 @ 14:09)  HR: 87 (11-25-20 @ 05:13) (86 - 87)  BP: 124/68 (11-25-20 @ 05:13) (117/56 - 129/54)  RR: 17 (11-25-20 @ 05:13) (17 - 17)  SpO2: 100% (11-25-20 @ 05:13) (95% - 100%)  Wt(kg): --  I&O's Summary      Appearance: Normal	  HEENT:   Normal oral mucosa, PERRL, EOMI	  Lymphatic: No lymphadenopathy  Cardiovascular: Normal S1 S2, No JVD, No murmurs, No edema  Respiratory: Lungs clear to auscultation	  Psychiatry: A & O x 3, Mood & affect appropriate  Gastrointestinal:  Soft, Non-tender, + BS	  Skin: No rashes, No ecchymoses, No cyanosis	  Neurologic: Non-focal  Extremities: Normal range of motion, No clubbing, cyanosis or edema  Vascular: Peripheral pulses palpable 2+ bilaterally    MEDICATIONS  (STANDING):  apixaban 2.5 milliGRAM(s) Oral two times a day  aspirin  chewable 81 milliGRAM(s) Oral daily  cefTRIAXone   IVPB 1000 milliGRAM(s) IV Intermittent every 24 hours  digoxin     Tablet 0.125 milliGRAM(s) Oral daily  metoprolol tartrate 25 milliGRAM(s) Oral two times a day  nystatin Ointment 1 Application(s) Topical two times a day  pantoprazole    Tablet 40 milliGRAM(s) Oral before breakfast  potassium phosphate / sodium phosphate Powder (PHOS-NaK) 1 Packet(s) Oral four times a day before meals  sodium chloride 0.9%. 1000 milliLiter(s) (80 mL/Hr) IV Continuous <Continuous>      TELEMETRY: 	nsr    	  	  LABS:	 	                      12.2   10.46 )-----------( 211      ( 25 Nov 2020 06:49 )             37.0     11-25    140  |  105  |  10  ----------------------------<  97  3.7   |  26  |  0.64    Ca    8.5      25 Nov 2020 06:49  Phos  2.2     11-25  Mg     2.1     11-25      proBNP:   Lipid Profile: Cholesterol 115  LDL --  HDL 55  TG 71    HgA1c:   TSH: Thyroid Stimulating Hormone, Serum: 1.90 uU/mL (11-23 @ 06:02)      	    ch< from: Transthoracic Echocardiogram (11.24.20 @ 11:04) >  OBSERVATIONS:  Mitral Valve: Normal mitral valve.  Aortic Root: Aortic Root: 2.8 cm.    Aortic Valve: Normal trileaflet aortic valve.  Left Atrium: LA volume index = 15 cc/m2.  Left Ventricle: Normal Left Ventricular Systolic Function,  (EF = 55 to 60%) Normal left ventricular internal  dimensions and wall thicknesses. Normal diastolic function.  Right Heart: Normal right atrium. Normal right ventricular  size and systolic function (TAPSE 2.7 cm). Normal tricuspid  valve. Normal pulmonic valve.  Pericardium/PleuraNormal pericardium with no pericardial  effusion.  Hemodynamic: RV systolic pressure is normal at  20 mm Hg.

## 2020-11-25 NOTE — DIETITIAN INITIAL EVALUATION ADULT. - PERTINENT LABORATORY DATA
11-25 Na140 mmol/L Glu 97 mg/dL K+ 3.7 mmol/L Cr  0.64 mg/dL BUN 10 mg/dL 11-25 Phos 2.2 mg/dL<L> 11-23 Alb 2.1 g/dL<L> 11-23 Chol 115 mg/dL LDL --    HDL 55 mg/dL Trig 71 mg/dL

## 2020-11-25 NOTE — PROGRESS NOTE ADULT - SUBJECTIVE AND OBJECTIVE BOX
JOSE GARDNER  MR# [id number]  85yFemale        Patient is a 85y old  Female who presents with a chief complaint of abdominal pain and urinary symptoms (25 Nov 2020 12:57)      INTERVAL HPI/OVERNIGHT EVENTS:  Patient seen and examined at bedside. No notations of chest pain, palpitation, SOB, orthopnea, nausea, vomiting or abdominal pain.    ALLERGIES  No Known Allergies      MEDICATIONS  acetaminophen   Tablet .. 650 milliGRAM(s) Oral every 6 hours PRN Temp greater or equal to 38C (100.4F)  apixaban 2.5 milliGRAM(s) Oral two times a day  cefTRIAXone   IVPB 1000 milliGRAM(s) IV Intermittent every 24 hours  digoxin     Tablet 0.125 milliGRAM(s) Oral daily  metoprolol tartrate 25 milliGRAM(s) Oral two times a day  nystatin Ointment 1 Application(s) Topical two times a day  pantoprazole    Tablet 40 milliGRAM(s) Oral before breakfast  potassium phosphate / sodium phosphate Powder (PHOS-NaK) 1 Packet(s) Oral four times a day before meals  sodium chloride 0.9%. 1000 milliLiter(s) IV Continuous <Continuous>              REVIEW OF SYSTEMS:  CONSTITUTIONAL: No fever, weight loss, or fatigue  EYES: No eye pain, visual disturbances, or discharge  ENT:  No difficulty hearing, tinnitus, vertigo; No sinus or throat pain  NECK: No pain or stiffness  RESPIRATORY: No cough, wheezing, chills or hemoptysis; No Shortness of Breath  CARDIOVASCULAR: No chest pain, palpitations, passing out, dizziness, or leg swelling  GASTROINTESTINAL: No abdominal or epigastric pain. No nausea, vomiting, or hematemesis; No diarrhea or constipation. No melena or hematochezia.  GENITOURINARY: No dysuria, frequency, hematuria, or incontinence  NEUROLOGICAL: No headaches, memory loss, loss of strength, numbness, or tremors  SKIN: No itching, burning, rashes, or lesions   LYMPH Nodes: No enlarged glands  ENDOCRINE: No heat or cold intolerance; No hair loss  MUSCULOSKELETAL: No joint pain or swelling; No muscle, back, or extremity pain  PSYCHIATRIC: No depression, anxiety, mood swings, or difficulty sleeping  HEME/LYMPH: No easy bruising, or bleeding gums  ALLERGY AND IMMUNOLOGIC: No hives or eczema	    [ ] All others negative	  [ ] Unable to obtain      T(C): 37.1 (11-25-20 @ 10:30), Max: 37.3 (11-24-20 @ 14:09)  T(F): 98.8 (11-25-20 @ 10:30), Max: 99.1 (11-24-20 @ 14:09)  HR: 90 (11-25-20 @ 10:30) (86 - 90)  BP: 115/62 (11-25-20 @ 10:30) (115/62 - 129/54)  RR: 18 (11-25-20 @ 10:30) (17 - 18)  SpO2: 100% (11-25-20 @ 10:30) (95% - 100%)  Wt(kg): --    I&O's Summary        PHYSICAL EXAM:  A X O x  HEAD:  Atraumatic, Normocephalic  EYES: EOMI, PERRLA, conjunctiva and sclera clear  NECK: Supple, No JVD, Normal thyroid  Resp: CTAB, No crackles, wheezing,   CVS: Regular rate and rhythm; No discernable murmurs, rubs, or gallops  ABD: Soft, Nontender, Nondistended; Bowel sounds present  EXTREMITIES:  2+ Peripheral Pulses, No edema  LYMPH: No dicernable lymphadenopathy noted  GENERAL: NAD, well-groomed, well-developed      LABS:                        12.2   10.46 )-----------( 211      ( 25 Nov 2020 06:49 )             37.0     11-25    140  |  105  |  10  ----------------------------<  97  3.7   |  26  |  0.64    Ca    8.5      25 Nov 2020 06:49  Phos  2.2     11-25  Mg     2.1     11-25          CAPILLARY BLOOD GLUCOSE          Troponins:  ProBNP:  Lipid Profile:   HgA1c:  TSH:           RADIOLOGY & ADDITIONAL TESTS:    Imaging Personally Reviewed:  [ ] YES  [ ] NO      Consultant(s) Notes Reviewed:  [x ] YES  [ ] NO    Care Discussed with Consultants/Other Providers [ x] YES  [ ] NO          PAST MEDICAL & SURGICAL HISTORY:  Spinal stenosis, unspecified spinal region    Scoliosis, unspecified scoliosis type, unspecified spinal region    Cataract of both eyes, unspecified cataract type          Sepsis    No pertinent family history in first degree relatives    Handoff    MEWS Score    Spinal stenosis, unspecified spinal region    Scoliosis, unspecified scoliosis type, unspecified spinal region    Sepsis without acute organ dysfunction, due to unspecified organism    Discharge planning issues    Hypoalbuminemia due to protein-calorie malnutrition    Urinary retention    Paroxysmal atrial fibrillation with RVR    HTN (hypertension)    Need for prophylactic measure    Sepsis secondary to UTI    Cataract of both eyes, unspecified cataract type    A ABDOMINAL PAIN    Urinary tract infection without hematuria, site unspecified    SysAdmin_VisitLink

## 2020-11-25 NOTE — DIETITIAN INITIAL EVALUATION ADULT. - OTHER INFO
pt from home, alone. admitted for tx of sepsis 2/2 UTI. pt visited. reports fair intake of meals inhouse 2/2 dislike of meals provided. pt agreeable to ensure enlive BID. pt denies chewing/swallowing difficulties. reports good appetite at home, 2 meals l/d, small breakfast. reports stable wt ~125#. +1 right ankle edema noted. pt requesting extra salt packet, denies having HTN; on hypertensive meds. explained will keep diet liberalized, but not provide extra salt. pt for d/c to [facility] when stable.

## 2020-11-25 NOTE — PROGRESS NOTE ADULT - ASSESSMENT
Patient is a 85y old  Female from home with PMH of HTN, spinal stenosis, scoliosis presents to ER for evaluation of  lower abdominal pain and dysuria and increased  urinary frequency. On admission, she found to have fever, tachycardia, Leukocytosis and positive Urine analysis. The CXR shows Small left base infiltrate. She has started on Ceftriaxone and the Id consult requested to assist with further evaluation and antibiotic management.     # Sepsis ( Fever + tachycardia+ Leukocytosis)  # UTI  # Possible pneumonia- on CXR  # Urinary retention - > 700 cc on bladder scan- 11/23    would recommend:    1. Follow up final  Urine culture  2. Continue  alfonso catheter since retaining >700 ml  3. Monitor Temp. and c/w supportive care  4. Continue Ceftriaxone until work up is done  5. OOB to chair    d/w patient and Nursing staff    Attending Attestation:    Spent more than 45 minutes on total encounter, more than 50 % of the visit was spent counseling and/or coordinating care by the Attending physician. Patient is a 85y old  Female from home with PMH of HTN, spinal stenosis, scoliosis presents to ER for evaluation of  lower abdominal pain and dysuria and increased  urinary frequency. On admission, she found to have fever, tachycardia, Leukocytosis and positive Urine analysis. The CXR shows Small left base infiltrate. She has started on Ceftriaxone and the Id consult requested to assist with further evaluation and antibiotic management.     # Sepsis ( Fever + tachycardia+ Leukocytosis)- The Blood cultures have no growth to date   # UTI - urine culture grew E,.coli.  # Possible pneumonia- on CXR  # Urinary retention - > 700 cc on bladder scan- 11/23- s/p removal of alfonso catheter today, 11/25    would recommend:    1. Monitor Temp. and c/w supportive care  2. Follow up final cultures   3. Continue Ceftriaxone inpatient and May change to oral levaqun 500 mg daily on discharge to continue until 11/28/20  4. OOB to chair    d/w patient and Nursing staff    Attending Attestation:    Spent more than 45 minutes on total encounter, more than 50 % of the visit was spent counseling and/or coordinating care by the Attending physician.

## 2020-11-26 LAB
ANION GAP SERPL CALC-SCNC: 5 MMOL/L — SIGNIFICANT CHANGE UP (ref 5–17)
BUN SERPL-MCNC: 20 MG/DL — HIGH (ref 7–18)
CALCIUM SERPL-MCNC: 8.5 MG/DL — SIGNIFICANT CHANGE UP (ref 8.4–10.5)
CHLORIDE SERPL-SCNC: 106 MMOL/L — SIGNIFICANT CHANGE UP (ref 96–108)
CO2 SERPL-SCNC: 30 MMOL/L — SIGNIFICANT CHANGE UP (ref 22–31)
CREAT SERPL-MCNC: 0.75 MG/DL — SIGNIFICANT CHANGE UP (ref 0.5–1.3)
GLUCOSE SERPL-MCNC: 96 MG/DL — SIGNIFICANT CHANGE UP (ref 70–99)
HCT VFR BLD CALC: 36.1 % — SIGNIFICANT CHANGE UP (ref 34.5–45)
HGB BLD-MCNC: 11.7 G/DL — SIGNIFICANT CHANGE UP (ref 11.5–15.5)
MCHC RBC-ENTMCNC: 31 PG — SIGNIFICANT CHANGE UP (ref 27–34)
MCHC RBC-ENTMCNC: 32.4 GM/DL — SIGNIFICANT CHANGE UP (ref 32–36)
MCV RBC AUTO: 95.8 FL — SIGNIFICANT CHANGE UP (ref 80–100)
NRBC # BLD: 0 /100 WBCS — SIGNIFICANT CHANGE UP (ref 0–0)
PLATELET # BLD AUTO: 235 K/UL — SIGNIFICANT CHANGE UP (ref 150–400)
POTASSIUM SERPL-MCNC: 4.1 MMOL/L — SIGNIFICANT CHANGE UP (ref 3.5–5.3)
POTASSIUM SERPL-SCNC: 4.1 MMOL/L — SIGNIFICANT CHANGE UP (ref 3.5–5.3)
RBC # BLD: 3.77 M/UL — LOW (ref 3.8–5.2)
RBC # FLD: 13.2 % — SIGNIFICANT CHANGE UP (ref 10.3–14.5)
SODIUM SERPL-SCNC: 141 MMOL/L — SIGNIFICANT CHANGE UP (ref 135–145)
WBC # BLD: 9.01 K/UL — SIGNIFICANT CHANGE UP (ref 3.8–10.5)
WBC # FLD AUTO: 9.01 K/UL — SIGNIFICANT CHANGE UP (ref 3.8–10.5)

## 2020-11-26 RX ADMIN — APIXABAN 2.5 MILLIGRAM(S): 2.5 TABLET, FILM COATED ORAL at 17:01

## 2020-11-26 RX ADMIN — Medication 0.12 MILLIGRAM(S): at 05:39

## 2020-11-26 RX ADMIN — NYSTATIN CREAM 1 APPLICATION(S): 100000 CREAM TOPICAL at 05:40

## 2020-11-26 RX ADMIN — NYSTATIN CREAM 1 APPLICATION(S): 100000 CREAM TOPICAL at 17:01

## 2020-11-26 RX ADMIN — Medication 25 MILLIGRAM(S): at 05:39

## 2020-11-26 RX ADMIN — CEFTRIAXONE 100 MILLIGRAM(S): 500 INJECTION, POWDER, FOR SOLUTION INTRAMUSCULAR; INTRAVENOUS at 05:39

## 2020-11-26 RX ADMIN — PANTOPRAZOLE SODIUM 40 MILLIGRAM(S): 20 TABLET, DELAYED RELEASE ORAL at 05:39

## 2020-11-26 RX ADMIN — Medication 25 MILLIGRAM(S): at 17:01

## 2020-11-26 RX ADMIN — APIXABAN 2.5 MILLIGRAM(S): 2.5 TABLET, FILM COATED ORAL at 05:39

## 2020-11-26 NOTE — PROGRESS NOTE ADULT - SUBJECTIVE AND OBJECTIVE BOX
JOSE GARDNER  MR# [id number]  85yFemale        Patient is a 85y old  Female who presents with a chief complaint of abdominal pain and urinary symptoms (25 Nov 2020 12:57)      INTERVAL HPI/OVERNIGHT EVENTS:  Patient seen and examined at bedside. No notations of chest pain, palpitation, SOB, orthopnea, nausea, vomiting or abdominal pain.    ALLERGIES  No Known Allergies      MEDICATIONS  acetaminophen   Tablet .. 650 milliGRAM(s) Oral every 6 hours PRN Temp greater or equal to 38C (100.4F)  apixaban 2.5 milliGRAM(s) Oral two times a day  cefTRIAXone   IVPB 1000 milliGRAM(s) IV Intermittent every 24 hours  digoxin     Tablet 0.125 milliGRAM(s) Oral daily  metoprolol tartrate 25 milliGRAM(s) Oral two times a day  nystatin Ointment 1 Application(s) Topical two times a day  pantoprazole    Tablet 40 milliGRAM(s) Oral before breakfast  potassium phosphate / sodium phosphate Powder (PHOS-NaK) 1 Packet(s) Oral four times a day before meals  sodium chloride 0.9%. 1000 milliLiter(s) IV Continuous <Continuous>              REVIEW OF SYSTEMS:  CONSTITUTIONAL: No fever, weight loss, or fatigue  EYES: No eye pain, visual disturbances, or discharge  ENT:  No difficulty hearing, tinnitus, vertigo; No sinus or throat pain  NECK: No pain or stiffness  RESPIRATORY: No cough, wheezing, chills or hemoptysis; No Shortness of Breath  CARDIOVASCULAR: No chest pain, palpitations, passing out, dizziness, or leg swelling  GASTROINTESTINAL: No abdominal or epigastric pain. No nausea, vomiting, or hematemesis; No diarrhea or constipation. No melena or hematochezia.  GENITOURINARY: No dysuria, frequency, hematuria, or incontinence  NEUROLOGICAL: No headaches, memory loss, loss of strength, numbness, or tremors  SKIN: No itching, burning, rashes, or lesions   LYMPH Nodes: No enlarged glands  ENDOCRINE: No heat or cold intolerance; No hair loss  MUSCULOSKELETAL: No joint pain or swelling; No muscle, back, or extremity pain  PSYCHIATRIC: No depression, anxiety, mood swings, or difficulty sleeping  HEME/LYMPH: No easy bruising, or bleeding gums  ALLERGY AND IMMUNOLOGIC: No hives or eczema	    [ ] All others negative	  [ ] Unable to obtain    Vital Signs Last 24 Hrs  T(C): 37 (26 Nov 2020 14:25), Max: 37.1 (26 Nov 2020 01:55)  T(F): 98.6 (26 Nov 2020 14:25), Max: 98.7 (26 Nov 2020 01:55)  HR: 85 (26 Nov 2020 16:58) (79 - 109)  BP: 111/77 (26 Nov 2020 16:58) (107/65 - 128/79)  BP(mean): --  RR: 18 (26 Nov 2020 14:25) (17 - 18)  SpO2: 97% (26 Nov 2020 14:25) (94% - 97%)        PHYSICAL EXAM:  A X O x  HEAD:  Atraumatic, Normocephalic  EYES: EOMI, PERRLA, conjunctiva and sclera clear  NECK: Supple, No JVD, Normal thyroid  Resp: CTAB, No crackles, wheezing,   CVS: Regular rate and rhythm; No discernable murmurs, rubs, or gallops  ABD: Soft, Nontender, Nondistended; Bowel sounds present  EXTREMITIES:  2+ Peripheral Pulses, No edema  LYMPH: No dicernable lymphadenopathy noted  GENERAL: NAD, well-groomed, well-developed      LABS:                                             11.7   9.01  )-----------( 235      ( 26 Nov 2020 06:22 )             36.1   11-26    141  |  106  |  20<H>  ----------------------------<  96  4.1   |  30  |  0.75    Ca    8.5      26 Nov 2020 06:22  Phos  2.2     11-25  Mg     2.1     11-25          CAPILLARY BLOOD GLUCOSE          Troponins:  ProBNP:  Lipid Profile:   HgA1c:  TSH:           RADIOLOGY & ADDITIONAL TESTS:    Imaging Personally Reviewed:  [ ] YES  [ ] NO      Consultant(s) Notes Reviewed:  [x ] YES  [ ] NO    Care Discussed with Consultants/Other Providers [ x] YES  [ ] NO          PAST MEDICAL & SURGICAL HISTORY:  Spinal stenosis, unspecified spinal region    Scoliosis, unspecified scoliosis type, unspecified spinal region    Cataract of both eyes, unspecified cataract type          Sepsis    No pertinent family history in first degree relatives    Handoff    MEWS Score    Spinal stenosis, unspecified spinal region    Scoliosis, unspecified scoliosis type, unspecified spinal region    Sepsis without acute organ dysfunction, due to unspecified organism    Discharge planning issues    Hypoalbuminemia due to protein-calorie malnutrition    Urinary retention    Paroxysmal atrial fibrillation with RVR    HTN (hypertension)    Need for prophylactic measure    Sepsis secondary to UTI    Cataract of both eyes, unspecified cataract type    A ABDOMINAL PAIN    Urinary tract infection without hematuria, site unspecified    SysAdmin_VisitLink

## 2020-11-26 NOTE — PROGRESS NOTE ADULT - ASSESSMENT
85 yr F from home with PMH of HTN, spinal stenosis, scoliosis presents to ED with complaints of  lower abdominal pain and burning with urination since 4 days,UTI,PAF with RVR.  1.D/c Tele monitoring.  2.UTI-ABX.  3.PAF-eliquis,lopressor,dig .125mg qd.  4.D/C asa.  5.Urinary retention-bladder scan this am.  6.PPI.

## 2020-11-26 NOTE — PROGRESS NOTE ADULT - SUBJECTIVE AND OBJECTIVE BOX
CHIEF COMPLAINT:Patient is a 85y old  Female who presents with a chief complaint of abdominal pain and urinary symptoms .pt appears comfortable.    	  REVIEW OF SYSTEMS:  CONSTITUTIONAL: No fever, weight loss, or fatigue  EYES: No eye pain, visual disturbances, or discharge  ENT:  No difficulty hearing, tinnitus, vertigo; No sinus or throat pain  NECK: No pain or stiffness  RESPIRATORY: No cough, wheezing, chills or hemoptysis; No Shortness of Breath  CARDIOVASCULAR: No chest pain, palpitations, passing out, dizziness, or leg swelling  GASTROINTESTINAL: No abdominal or epigastric pain. No nausea, vomiting, or hematemesis; No diarrhea or constipation. No melena or hematochezia.  GENITOURINARY: No dysuria, frequency, hematuria, or incontinence  NEUROLOGICAL: No headaches, memory loss, loss of strength, numbness, or tremors  SKIN: No itching, burning, rashes, or lesions   LYMPH Nodes: No enlarged glands  ENDOCRINE: No heat or cold intolerance; No hair loss  MUSCULOSKELETAL: No joint pain or swelling; No muscle, back, or extremity pain  PSYCHIATRIC: No depression, anxiety, mood swings, or difficulty sleeping  HEME/LYMPH: No easy bruising, or bleeding gums  ALLERGY AND IMMUNOLOGIC: No hives or eczema	      PHYSICAL EXAM:  T(C): 37.1 (11-26-20 @ 05:37), Max: 37.1 (11-26-20 @ 01:55)  HR: 79 (11-26-20 @ 05:37) (79 - 112)  BP: 123/62 (11-26-20 @ 05:37) (92/56 - 128/79)  RR: 17 (11-26-20 @ 05:37) (17 - 18)  SpO2: 94% (11-26-20 @ 05:37) (94% - 98%)        Appearance: Normal	  HEENT:   Normal oral mucosa, PERRL, EOMI	  Lymphatic: No lymphadenopathy  Cardiovascular: Normal S1 S2, No JVD, No murmurs, No edema  Respiratory: Lungs clear to auscultation	  Psychiatry: A & O x 3, Mood & affect appropriate  Gastrointestinal:  Soft, Non-tender, + BS	  Skin: No rashes, No ecchymoses, No cyanosis	  Neurologic: Non-focal  Extremities: Normal range of motion, No clubbing, cyanosis or edema  Vascular: Peripheral pulses palpable 2+ bilaterally    MEDICATIONS  (STANDING):  apixaban 2.5 milliGRAM(s) Oral two times a day  cefTRIAXone   IVPB 1000 milliGRAM(s) IV Intermittent every 24 hours  digoxin     Tablet 0.125 milliGRAM(s) Oral daily  metoprolol tartrate 25 milliGRAM(s) Oral two times a day  nystatin Ointment 1 Application(s) Topical two times a day  pantoprazole    Tablet 40 milliGRAM(s) Oral before breakfast  sodium chloride 0.9%. 1000 milliLiter(s) (80 mL/Hr) IV Continuous <Continuous>      TELEMETRY: paf	    	  	  LABS:	 	                       11.7   9.01  )-----------( 235      ( 26 Nov 2020 06:22 )             36.1     11-26    141  |  106  |  20<H>  ----------------------------<  96  4.1   |  30  |  0.75    Ca    8.5      26 Nov 2020 06:22  Phos  2.2     11-25  Mg     2.1     11-25        Lipid Profile: Cholesterol 115  LDL --  HDL 55  TG 71      TSH: Thyroid Stimulating Hormone, Serum: 1.90 uU/mL (11-23 @ 06:02)

## 2020-11-27 LAB
ANION GAP SERPL CALC-SCNC: 7 MMOL/L — SIGNIFICANT CHANGE UP (ref 5–17)
BUN SERPL-MCNC: 22 MG/DL — HIGH (ref 7–18)
CALCIUM SERPL-MCNC: 8.9 MG/DL — SIGNIFICANT CHANGE UP (ref 8.4–10.5)
CHLORIDE SERPL-SCNC: 106 MMOL/L — SIGNIFICANT CHANGE UP (ref 96–108)
CO2 SERPL-SCNC: 29 MMOL/L — SIGNIFICANT CHANGE UP (ref 22–31)
CREAT SERPL-MCNC: 0.85 MG/DL — SIGNIFICANT CHANGE UP (ref 0.5–1.3)
CULTURE RESULTS: SIGNIFICANT CHANGE UP
CULTURE RESULTS: SIGNIFICANT CHANGE UP
GLUCOSE SERPL-MCNC: 89 MG/DL — SIGNIFICANT CHANGE UP (ref 70–99)
HCT VFR BLD CALC: 39.3 % — SIGNIFICANT CHANGE UP (ref 34.5–45)
HGB BLD-MCNC: 12.7 G/DL — SIGNIFICANT CHANGE UP (ref 11.5–15.5)
MCHC RBC-ENTMCNC: 30.8 PG — SIGNIFICANT CHANGE UP (ref 27–34)
MCHC RBC-ENTMCNC: 32.3 GM/DL — SIGNIFICANT CHANGE UP (ref 32–36)
MCV RBC AUTO: 95.4 FL — SIGNIFICANT CHANGE UP (ref 80–100)
NRBC # BLD: 0 /100 WBCS — SIGNIFICANT CHANGE UP (ref 0–0)
PHOSPHATE SERPL-MCNC: 3.3 MG/DL — SIGNIFICANT CHANGE UP (ref 2.5–4.5)
PLATELET # BLD AUTO: 292 K/UL — SIGNIFICANT CHANGE UP (ref 150–400)
POTASSIUM SERPL-MCNC: 4.6 MMOL/L — SIGNIFICANT CHANGE UP (ref 3.5–5.3)
POTASSIUM SERPL-SCNC: 4.6 MMOL/L — SIGNIFICANT CHANGE UP (ref 3.5–5.3)
RBC # BLD: 4.12 M/UL — SIGNIFICANT CHANGE UP (ref 3.8–5.2)
RBC # FLD: 13.2 % — SIGNIFICANT CHANGE UP (ref 10.3–14.5)
SODIUM SERPL-SCNC: 142 MMOL/L — SIGNIFICANT CHANGE UP (ref 135–145)
SPECIMEN SOURCE: SIGNIFICANT CHANGE UP
SPECIMEN SOURCE: SIGNIFICANT CHANGE UP
WBC # BLD: 8.68 K/UL — SIGNIFICANT CHANGE UP (ref 3.8–10.5)
WBC # FLD AUTO: 8.68 K/UL — SIGNIFICANT CHANGE UP (ref 3.8–10.5)

## 2020-11-27 RX ADMIN — NYSTATIN CREAM 1 APPLICATION(S): 100000 CREAM TOPICAL at 06:20

## 2020-11-27 RX ADMIN — Medication 25 MILLIGRAM(S): at 06:21

## 2020-11-27 RX ADMIN — CEFTRIAXONE 100 MILLIGRAM(S): 500 INJECTION, POWDER, FOR SOLUTION INTRAMUSCULAR; INTRAVENOUS at 06:21

## 2020-11-27 RX ADMIN — NYSTATIN CREAM 1 APPLICATION(S): 100000 CREAM TOPICAL at 17:32

## 2020-11-27 RX ADMIN — Medication 0.12 MILLIGRAM(S): at 06:21

## 2020-11-27 RX ADMIN — Medication 25 MILLIGRAM(S): at 17:33

## 2020-11-27 RX ADMIN — APIXABAN 2.5 MILLIGRAM(S): 2.5 TABLET, FILM COATED ORAL at 06:21

## 2020-11-27 RX ADMIN — APIXABAN 2.5 MILLIGRAM(S): 2.5 TABLET, FILM COATED ORAL at 17:33

## 2020-11-27 RX ADMIN — PANTOPRAZOLE SODIUM 40 MILLIGRAM(S): 20 TABLET, DELAYED RELEASE ORAL at 06:21

## 2020-11-27 NOTE — CHART NOTE - NSCHARTNOTEFT_GEN_A_CORE
EVENT: Urinary retention:  Patient with admitted with UTI and urinary retention. Patient had foely placed on admission for retention but alfonso has since been d/c'd. Patient passed TOV but has been having consistently high post void residuals of approximately 200-300ml.  Most recent post void residual >600. Alfonso reordered. Current antibiotics to be continued.     OBJECTIVE:  Vital Signs Last 24 Hrs  T(C): 36.2 (27 Nov 2020 04:54), Max: 37 (26 Nov 2020 14:25)  T(F): 97.1 (27 Nov 2020 04:54), Max: 98.6 (26 Nov 2020 14:25)  HR: 79 (27 Nov 2020 04:54) (79 - 109)  BP: 125/69 (27 Nov 2020 04:54) (107/65 - 135/68)  BP(mean): --  RR: 17 (27 Nov 2020 04:54) (17 - 18)  SpO2: 96% (27 Nov 2020 04:54) (95% - 97%)    FOCUSED PHYSICAL EXAM: A&OX3.    LABS:                        12.7   8.68  )-----------( 292      ( 27 Nov 2020 06:08 )             39.3     11-27    142  |  106  |  22<H>  ----------------------------<  89  4.6   |  29  |  0.85    Ca    8.9      27 Nov 2020 06:08  Phos  3.3     11-27        EKG:   IMGAGING:    ASSESSMENT:  HPI:  85 yr F from home with PMH of HTN, spinal stenosis, scoliosis presents to ED with complaints of  lower abdominal pain and burning with urination since 4 days. Pt reports increased urge to urinate and burning with urination beginning four days ago. Pt also complains dull lower abdominal pain when she urinates, 6/10 in severity and non-radiating. 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 Pt denies blood in urine, diarrhea, vomiting, chest pain, difficulty breathing, fevers, or recent sick contacts.    In ED /74, HR 95     (22 Nov 2020 18:02)      PLAN:     FOLLOW UP / RESULT:

## 2020-11-27 NOTE — PROGRESS NOTE ADULT - SUBJECTIVE AND OBJECTIVE BOX
CHIEF COMPLAINT:Patient is a 85y old  Female who presents with a chief complaint of abdominal pain and urinary symptoms.Pt with 600ml urine on bladder scan, Jaimes re-ordered.    	  REVIEW OF SYSTEMS:  CONSTITUTIONAL: No fever, weight loss, or fatigue  EYES: No eye pain, visual disturbances, or discharge  ENT:  No difficulty hearing, tinnitus, vertigo; No sinus or throat pain  NECK: No pain or stiffness  RESPIRATORY: No cough, wheezing, chills or hemoptysis; No Shortness of Breath  CARDIOVASCULAR: No chest pain, palpitations, passing out, dizziness, or leg swelling  GASTROINTESTINAL: No abdominal or epigastric pain. No nausea, vomiting, or hematemesis; No diarrhea or constipation. No melena or hematochezia.  GENITOURINARY: No dysuria, frequency, hematuria, or incontinence  NEUROLOGICAL: No headaches, memory loss, loss of strength, numbness, or tremors  SKIN: No itching, burning, rashes, or lesions   LYMPH Nodes: No enlarged glands  ENDOCRINE: No heat or cold intolerance; No hair loss  MUSCULOSKELETAL: No joint pain or swelling; No muscle, back, or extremity pain  PSYCHIATRIC: No depression, anxiety, mood swings, or difficulty sleeping  HEME/LYMPH: No easy bruising, or bleeding gums  ALLERGY AND IMMUNOLOGIC: No hives or eczema	      PHYSICAL EXAM:  T(C): 36.2 (11-27-20 @ 04:54), Max: 37 (11-26-20 @ 14:25)  HR: 79 (11-27-20 @ 04:54) (79 - 89)  BP: 125/69 (11-27-20 @ 04:54) (107/65 - 135/68)  RR: 17 (11-27-20 @ 04:54) (17 - 18)  SpO2: 96% (11-27-20 @ 04:54) (96% - 97%)  Wt(kg): --  I&O's Summary    26 Nov 2020 07:01  -  27 Nov 2020 07:00  --------------------------------------------------------  IN: 0 mL / OUT: 1 mL / NET: -1 mL        Appearance: Normal	  HEENT:   Normal oral mucosa, PERRL, EOMI	  Lymphatic: No lymphadenopathy  Cardiovascular: Normal S1 S2, No JVD, No murmurs, No edema  Respiratory: Lungs clear to auscultation	  Psychiatry: A & O x 3, Mood & affect appropriate  Gastrointestinal:  Soft, Non-tender, + BS	  Skin: No rashes, No ecchymoses, No cyanosis	  Neurologic: Non-focal  Extremities: Normal range of motion, No clubbing, cyanosis or edema  Vascular: Peripheral pulses palpable 2+ bilaterally    MEDICATIONS  (STANDING):  apixaban 2.5 milliGRAM(s) Oral two times a day  cefTRIAXone   IVPB 1000 milliGRAM(s) IV Intermittent every 24 hours  digoxin     Tablet 0.125 milliGRAM(s) Oral daily  metoprolol tartrate 25 milliGRAM(s) Oral two times a day  nystatin Ointment 1 Application(s) Topical two times a day  pantoprazole    Tablet 40 milliGRAM(s) Oral before breakfast  sodium chloride 0.9%. 1000 milliLiter(s) (80 mL/Hr) IV Continuous <Continuous>  	  	  LABS:	 	                       12.7   8.68  )-----------( 292      ( 27 Nov 2020 06:08 )             39.3     11-27    142  |  106  |  22<H>  ----------------------------<  89  4.6   |  29  |  0.85    Ca    8.9      27 Nov 2020 06:08  Phos  3.3     11-27        Lipid Profile: Cholesterol 115  LDL --  HDL 55  TG 71      TSH: Thyroid Stimulating Hormone, Serum: 1.90 uU/mL (11-23 @ 06:02)

## 2020-11-27 NOTE — PROGRESS NOTE ADULT - ASSESSMENT
85 yr F from home with PMH of HTN, spinal stenosis, scoliosis presents to ED with complaints of  lower abdominal pain and burning with urination since 4 days,UTI,PAF with RVR.  1.SANTOSH.  2.UTI-ABX.  3.PAF-eliquis,lopressor,dig .125mg qd.  4.Urinary retention-alfonso.  5.PPI.

## 2020-11-27 NOTE — PROGRESS NOTE ADULT - SUBJECTIVE AND OBJECTIVE BOX
Patient is seen and examined at the bed side, is afebrile today. She mentioned feeling better, and s/p removal of alfonso catheter and passed  TOV.          REVIEW OF SYSTEMS: All other review systems are negative      ALLERGIES: No Known Allergies      Vital Signs Last 24 Hrs  T(C): 36.8 (2020 13:59), Max: 37 (2020 01:37)  T(F): 98.2 (2020 13:59), Max: 98.6 (2020 01:37)  HR: 89 (2020 13:59) (79 - 89)  BP: 106/60 (2020 13:59) (106/60 - 135/68)  BP(mean): --  RR: 18 (:59) (17 - 18)  SpO2: 96% (:59) (96% - 98%)      PHYSICAL EXAM:  GENERAL: Not in distress   CHEST/LUNG: Not using accessory muscles   HEART: s1 and s2 present  ABDOMEN: Lower abdominal tenderness resolved  : Alfonso catheter in placed   EXTREMITIES: No pedal  edema  CNS: Awake and Alert      LABS:                        12.7   8.68  )-----------( 292      ( 2020 06:08 )             39.3                           12.2   10.46 )-----------( 211      ( 2020 06:49 )             37.0                          11.3   11.63 )-----------( 144      ( 2020 06:02 )             34.4         11-    142  |  106  |  22<H>  ----------------------------<  89  4.6   |  29  |  0.85    Ca    8.9      2020 06:08  Phos  3.3     11-27      11-25    140  |  105  |  10  ----------------------------<  97  3.7   |  26  |  0.64    Ca    8.5      2020 06:49  Phos  2.2     11-25  Mg     2.1     11-25      TPro  5.8<L>  /  Alb  2.1<L>  /  TBili  0.6  /  DBili  x   /  AST  40  /  ALT  27  /  AlkPhos  70  11-      Urinalysis Basic - ( 2020 16:09 )  Color: Yellow / Appearance: Slightly Turbid / S.015 / pH: x  Gluc: x / Ketone: Negative  / Bili: Negative / Urobili: Negative   Blood: x / Protein: 100 / Nitrite: Positive   Leuk Esterase: Moderate / RBC: 5-10 /HPF / WBC >50 /HPF   Sq Epi: x / Non Sq Epi: Few /HPF / Bacteria: Moderate /HPF        MEDICATIONS  (STANDING):  ):  apixaban 2.5 milliGRAM(s) Oral two times a day  cefTRIAXone   IVPB 1000 milliGRAM(s) IV Intermittent every 24 hours  digoxin     Tablet 0.125 milliGRAM(s) Oral daily  metoprolol tartrate 25 milliGRAM(s) Oral two times a day  nystatin Ointment 1 Application(s) Topical two times a day  pantoprazole    Tablet 40 milliGRAM(s) Oral before breakfast  sodium chloride 0.9%. 1000 milliLiter(s) (80 mL/Hr) IV Continuous <Continuous>      RADIOLOGY & ADDITIONAL TESTS:    20 : Xray Chest 1 View- PORTABLE-Urgent (Xray Chest 1 View- PORTABLE-Urgent .) (20 @ 18:06) Small left base infiltrate.        MICROBIOLOGY DATA:    Culture - Urine (20 @ 02:01)   - Amikacin: S <=16   - Amoxicillin/Clavulanic Acid: S <=8/4   - Ampicillin: S <=8 These ampicillin results predict results for amoxicillin   - Ampicillin/Sulbactam: S <=4/2 Enterobacter, Citrobacter, and Serratia may develop resistance during prolonged therapy (3-4 days)   - Aztreonam: S <=4   - Cefazolin: S <=2 (MIC_CL_COM_ENTERIC_CEFAZU) For uncomplicated UTI with K. pneumoniae, E. coli, or P. mirablis: YULY <=16 is sensitive and YULY >=32 is resistant. This also predicts results for oral agents cefaclor, cefdinir, cefpodoxime, cefprozil, cefuroxime axetil, cephalexin and locarbef for uncomplicated UTI. Note that some isolates may be susceptible to these agents while testing resistant to cefazolin.   - Cefepime: S <=2   - Cefoxitin: S <=8   - Ceftriaxone: S <=1 Enterobacter, Citrobacter, and Serratia may develop resistance during prolonged therapy   - Ciprofloxacin: S <=0.25   - Ertapenem: S <=0.5   - Gentamicin: S <=2   - Imipenem: S <=1   - Levofloxacin: S <=0.5   - Meropenem: S <=1   - Nitrofurantoin: S <=32 Should not be used to treat pyelonephritis   - Piperacillin/Tazobactam: S <=8   - Tigecycline: S <=2   - Tobramycin: S <=2   - Trimethoprim/Sulfamethoxazole: S <=0.5/9.5   Specimen Source: .Urine Clean Catch (Midstream)   Culture Results:   >100,000 CFU/ml Escherichia coli   Culture - Urine (20 @ 02:01)   Specimen Source: .Urine Clean Catch (Midstream)   Culture Results: >100,000 CFU/ml Escherichia coli     Culture - Blood (20 @ 22:16)   Specimen Source: .Blood Blood   Culture Results:  No growth to date.     Culture - Blood (20 @ 22:16)   Specimen Source: .Blood Blood   Culture Results: No growth to date.     Respiratory Viral Panel with COVID-19 by [facility] (20 @ 16:46)   Rapid RVP Result: Rachel   SARS-CoV-2: Chelseatenavin:              Patient is seen and examined at the bed side, remains afebrile. She c/o not feeling well. The events noted regarding retaining Large volume of urine after removal of alfonso, even though passed TOV initially. The creatinine stay normal.         REVIEW OF SYSTEMS: All other review systems are negative      ALLERGIES: No Known Allergies      Vital Signs Last 24 Hrs  T(C): 36.8 (2020 13:59), Max: 37 (2020 01:37)  T(F): 98.2 (2020 13:59), Max: 98.6 (2020 01:37)  HR: 89 (:59) (79 - 89)  BP: 106/60 (:59) (106/60 - 135/68)  BP(mean): --  RR: 18 (:59) (17 - 18)  SpO2: 96% (:59) (96% - 98%)      PHYSICAL EXAM:  GENERAL: Not in distress   CHEST/LUNG: Not using accessory muscles   HEART: s1 and s2 present  ABDOMEN: Lower abdominal tenderness resolved  : Alfonso catheter in placed   EXTREMITIES: No pedal  edema  CNS: Awake and Alert        LABS:                        12.7   8.68  )-----------( 292      ( 2020 06:08 )             39.3                           12.2   10.46 )-----------( 211      ( 2020 06:49 )             37.0                          11.3   11.63 )-----------( 144      ( 2020 06:02 )             34.4             142  |  106  |  22<H>  ----------------------------<  89  4.6   |  29  |  0.85    Ca    8.9      2020 06:08  Phos  3.3         140  |  105  |  10  ----------------------------<  97  3.7   |  26  |  0.64    Ca    8.5      2020 06:49  Phos  2.2       Mg     2.1           TPro  5.8<L>  /  Alb  2.1<L>  /  TBili  0.6  /  DBili  x   /  AST  40  /  ALT  27  /  AlkPhos  70  11-      Urinalysis Basic - ( 2020 16:09 )  Color: Yellow / Appearance: Slightly Turbid / S.015 / pH: x  Gluc: x / Ketone: Negative  / Bili: Negative / Urobili: Negative   Blood: x / Protein: 100 / Nitrite: Positive   Leuk Esterase: Moderate / RBC: 5-10 /HPF / WBC >50 /HPF   Sq Epi: x / Non Sq Epi: Few /HPF / Bacteria: Moderate /HPF        MEDICATIONS  (STANDING):    apixaban 2.5 milliGRAM(s) Oral two times a day  cefTRIAXone   IVPB 1000 milliGRAM(s) IV Intermittent every 24 hours  digoxin     Tablet 0.125 milliGRAM(s) Oral daily  metoprolol tartrate 25 milliGRAM(s) Oral two times a day  nystatin Ointment 1 Application(s) Topical two times a day  pantoprazole    Tablet 40 milliGRAM(s) Oral before breakfast  sodium chloride 0.9%. 1000 milliLiter(s) (80 mL/Hr) IV Continuous <Continuous>      RADIOLOGY & ADDITIONAL TESTS:    20 : Xray Chest 1 View- PORTABLE-Urgent (Xray Chest 1 View- PORTABLE-Urgent .) (20 @ 18:06) Small left base infiltrate.        MICROBIOLOGY DATA:    Culture - Urine (20 @ 02:01)   - Amikacin: S <=16   - Amoxicillin/Clavulanic Acid: S <=8/4   - Ampicillin: S <=8 These ampicillin results predict results for amoxicillin   - Ampicillin/Sulbactam: S <=4/2 Enterobacter, Citrobacter, and Serratia may develop resistance during prolonged therapy (3-4 days)   - Aztreonam: S <=4   - Cefazolin: S <=2 (MIC_CL_COM_ENTERIC_CEFAZU) For uncomplicated UTI with K. pneumoniae, E. coli, or P. mirablis: YULY <=16 is sensitive and YULY >=32 is resistant. This also predicts results for oral agents cefaclor, cefdinir, cefpodoxime, cefprozil, cefuroxime axetil, cephalexin and locarbef for uncomplicated UTI. Note that some isolates may be susceptible to these agents while testing resistant to cefazolin.   - Cefepime: S <=2   - Cefoxitin: S <=8   - Ceftriaxone: S <=1 Enterobacter, Citrobacter, and Serratia may develop resistance during prolonged therapy   - Ciprofloxacin: S <=0.25   - Ertapenem: S <=0.5   - Gentamicin: S <=2   - Imipenem: S <=1   - Levofloxacin: S <=0.5   - Meropenem: S <=1   - Nitrofurantoin: S <=32 Should not be used to treat pyelonephritis   - Piperacillin/Tazobactam: S <=8   - Tigecycline: S <=2   - Tobramycin: S <=2   - Trimethoprim/Sulfamethoxazole: S <=0.5/9.5   Specimen Source: .Urine Clean Catch (Midstream)   Culture Results:   >100,000 CFU/ml Escherichia coli   Culture - Urine (20 @ 02:01)   Specimen Source: .Urine Clean Catch (Midstream)   Culture Results: >100,000 CFU/ml Escherichia coli     Culture - Blood (20 @ 22:16)   Specimen Source: .Blood Blood   Culture Results:  No growth to date.     Culture - Blood (20 @ 22:16)   Specimen Source: .Blood Blood   Culture Results: No growth to date.     Respiratory Viral Panel with COVID-19 by [facility] (20 @ 16:46)   Rapid RVP Result: Chelseatenavin   SARS-CoV-2: NotDetec:

## 2020-11-27 NOTE — PROGRESS NOTE ADULT - ASSESSMENT
Patient is a 85y old  Female from home with PMH of HTN, spinal stenosis, scoliosis presents to ER for evaluation of  lower abdominal pain and dysuria and increased  urinary frequency. On admission, she found to have fever, tachycardia, Leukocytosis and positive Urine analysis. The CXR shows Small left base infiltrate. She has started on Ceftriaxone and the Id consult requested to assist with further evaluation and antibiotic management.     # Sepsis ( Fever + tachycardia+ Leukocytosis)- The Blood cultures have no growth to date   # UTI - urine culture grew E,.coli.  # Possible pneumonia- on CXR  # Urinary retention - > 700 cc on bladder scan- 11/23- s/p removal of alfonso catheter today, 11/25    would recommend:    1. Monitor Temp. and c/w supportive care  2. Follow up final cultures   3. Continue Ceftriaxone inpatient and May change to oral levaqun 500 mg daily on discharge to continue until 11/28/20  4. OOB to chair    d/w patient and Nursing staff    Attending Attestation:    Spent more than 45 minutes on total encounter, more than 50 % of the visit was spent counseling and/or coordinating care by the Attending physician. Patient is a 85y old  Female from home with PMH of HTN, spinal stenosis, scoliosis presents to ER for evaluation of  lower abdominal pain and dysuria and increased  urinary frequency. On admission, she found to have fever, tachycardia, Leukocytosis and positive Urine analysis. The CXR shows Small left base infiltrate. She has started on Ceftriaxone and the Id consult requested to assist with further evaluation and antibiotic management.     # Sepsis ( Fever + tachycardia+ Leukocytosis)- The Blood cultures have no growth to date   # UTI - urine culture grew E,.coli.  # Possible pneumonia- on CXR  # Urinary retention - > 700 cc on bladder scan- 11/23- s/p removal of alfonso catheter today, 11/25    would recommend:    1. Please Obtain Urology evaluation for urinary retention   2. Need alfonso catheter for urinary retention   3. Continue Ceftriaxone inpatient and May change to oral levaquin 500 mg daily on discharge to continue until 11/28/20  4. OOB to chair    d/w patient and Dr. Serrano     Attending Attestation:    Spent more than 45 minutes on total encounter, more than 50 % of the visit was spent counseling and/or coordinating care by the Attending physician.

## 2020-11-27 NOTE — PROGRESS NOTE ADULT - SUBJECTIVE AND OBJECTIVE BOX
JOSE GARDNER  MR# [id number]  85yFemale        Patient is a 85y old  Female who presents with a chief complaint of abdominal pain and urinary symptoms (27 Nov 2020 10:30)      INTERVAL HPI/OVERNIGHT EVENTS:  Patient seen and examined at bedside. No notations of chest pain, palpitation, SOB, orthopnea, nausea, vomiting or abdominal pain.    ALLERGIES  No Known Allergies      MEDICATIONS  acetaminophen   Tablet .. 650 milliGRAM(s) Oral every 6 hours PRN Temp greater or equal to 38C (100.4F)  apixaban 2.5 milliGRAM(s) Oral two times a day  cefTRIAXone   IVPB 1000 milliGRAM(s) IV Intermittent every 24 hours  digoxin     Tablet 0.125 milliGRAM(s) Oral daily  metoprolol tartrate 25 milliGRAM(s) Oral two times a day  nystatin Ointment 1 Application(s) Topical two times a day  pantoprazole    Tablet 40 milliGRAM(s) Oral before breakfast  sodium chloride 0.9%. 1000 milliLiter(s) IV Continuous <Continuous>              REVIEW OF SYSTEMS:  CONSTITUTIONAL: No fever, weight loss, or fatigue  EYES: No eye pain, visual disturbances, or discharge  ENT:  No difficulty hearing, tinnitus, vertigo; No sinus or throat pain  NECK: No pain or stiffness  RESPIRATORY: No cough, wheezing, chills or hemoptysis; No Shortness of Breath  CARDIOVASCULAR: No chest pain, palpitations, passing out, dizziness, or leg swelling  GASTROINTESTINAL: No abdominal or epigastric pain. No nausea, vomiting, or hematemesis; No diarrhea or constipation. No melena or hematochezia.  GENITOURINARY: No dysuria, frequency, hematuria, or incontinence  NEUROLOGICAL: No headaches, memory loss, loss of strength, numbness, or tremors  SKIN: No itching, burning, rashes, or lesions   LYMPH Nodes: No enlarged glands  ENDOCRINE: No heat or cold intolerance; No hair loss  MUSCULOSKELETAL: No joint pain or swelling; No muscle, back, or extremity pain  PSYCHIATRIC: No depression, anxiety, mood swings, or difficulty sleeping  HEME/LYMPH: No easy bruising, or bleeding gums  ALLERGY AND IMMUNOLOGIC: No hives or eczema	    [ ] All others negative	  [ ] Unable to obtain      T(C): 36.8 (11-27-20 @ 10:33), Max: 37 (11-26-20 @ 14:25)  T(F): 98.2 (11-27-20 @ 10:33), Max: 98.6 (11-26-20 @ 14:25)  HR: 85 (11-27-20 @ 10:33) (79 - 89)  BP: 119/60 (11-27-20 @ 10:33) (107/65 - 135/68)  RR: 17 (11-27-20 @ 10:33) (17 - 18)  SpO2: 98% (11-27-20 @ 10:33) (96% - 98%)  Wt(kg): --    I&O's Summary    26 Nov 2020 07:01  -  27 Nov 2020 07:00  --------------------------------------------------------  IN: 0 mL / OUT: 1 mL / NET: -1 mL          PHYSICAL EXAM:  A X O x  HEAD:  Atraumatic, Normocephalic  EYES: EOMI, PERRLA, conjunctiva and sclera clear  NECK: Supple, No JVD, Normal thyroid  Resp: CTAB, No crackles, wheezing,   CVS: Regular rate and rhythm; No discernable murmurs, rubs, or gallops  ABD: Soft, Nontender, Nondistended; Bowel sounds present  EXTREMITIES:  2+ Peripheral Pulses, No edema  LYMPH: No dicernable lymphadenopathy noted  GENERAL: NAD, well-groomed, well-developed      LABS:                        12.7   8.68  )-----------( 292      ( 27 Nov 2020 06:08 )             39.3     11-27    142  |  106  |  22<H>  ----------------------------<  89  4.6   |  29  |  0.85    Ca    8.9      27 Nov 2020 06:08  Phos  3.3     11-27          CAPILLARY BLOOD GLUCOSE          Troponins:  ProBNP:  Lipid Profile:   HgA1c:  TSH:           RADIOLOGY & ADDITIONAL TESTS:    Imaging Personally Reviewed:  [ ] YES  [ ] NO      Consultant(s) Notes Reviewed:  [x ] YES  [ ] NO    Care Discussed with Consultants/Other Providers [ x] YES  [ ] NO          PAST MEDICAL & SURGICAL HISTORY:  Spinal stenosis, unspecified spinal region    Scoliosis, unspecified scoliosis type, unspecified spinal region    Cataract of both eyes, unspecified cataract type          Sepsis    No pertinent family history in first degree relatives    Handoff    MEWS Score    Spinal stenosis, unspecified spinal region    Scoliosis, unspecified scoliosis type, unspecified spinal region    Sepsis without acute organ dysfunction, due to unspecified organism    Discharge planning issues    Hypoalbuminemia due to protein-calorie malnutrition    Urinary retention    Paroxysmal atrial fibrillation with RVR    HTN (hypertension)    Need for prophylactic measure    Sepsis secondary to UTI    Cataract of both eyes, unspecified cataract type    A ABDOMINAL PAIN    Urinary tract infection without hematuria, site unspecified    SysAdmin_VisitLink

## 2020-11-28 LAB
ANION GAP SERPL CALC-SCNC: 4 MMOL/L — LOW (ref 5–17)
BUN SERPL-MCNC: 28 MG/DL — HIGH (ref 7–18)
CALCIUM SERPL-MCNC: 9.7 MG/DL — SIGNIFICANT CHANGE UP (ref 8.4–10.5)
CHLORIDE SERPL-SCNC: 102 MMOL/L — SIGNIFICANT CHANGE UP (ref 96–108)
CO2 SERPL-SCNC: 32 MMOL/L — HIGH (ref 22–31)
CREAT SERPL-MCNC: 0.92 MG/DL — SIGNIFICANT CHANGE UP (ref 0.5–1.3)
GLUCOSE SERPL-MCNC: 91 MG/DL — SIGNIFICANT CHANGE UP (ref 70–99)
HCT VFR BLD CALC: 42.9 % — SIGNIFICANT CHANGE UP (ref 34.5–45)
HGB BLD-MCNC: 13.8 G/DL — SIGNIFICANT CHANGE UP (ref 11.5–15.5)
MCHC RBC-ENTMCNC: 31.1 PG — SIGNIFICANT CHANGE UP (ref 27–34)
MCHC RBC-ENTMCNC: 32.2 GM/DL — SIGNIFICANT CHANGE UP (ref 32–36)
MCV RBC AUTO: 96.6 FL — SIGNIFICANT CHANGE UP (ref 80–100)
NRBC # BLD: 0 /100 WBCS — SIGNIFICANT CHANGE UP (ref 0–0)
PLATELET # BLD AUTO: 377 K/UL — SIGNIFICANT CHANGE UP (ref 150–400)
POTASSIUM SERPL-MCNC: 5.3 MMOL/L — SIGNIFICANT CHANGE UP (ref 3.5–5.3)
POTASSIUM SERPL-SCNC: 5.3 MMOL/L — SIGNIFICANT CHANGE UP (ref 3.5–5.3)
RBC # BLD: 4.44 M/UL — SIGNIFICANT CHANGE UP (ref 3.8–5.2)
RBC # FLD: 13.3 % — SIGNIFICANT CHANGE UP (ref 10.3–14.5)
SODIUM SERPL-SCNC: 138 MMOL/L — SIGNIFICANT CHANGE UP (ref 135–145)
WBC # BLD: 10.78 K/UL — HIGH (ref 3.8–10.5)
WBC # FLD AUTO: 10.78 K/UL — HIGH (ref 3.8–10.5)

## 2020-11-28 RX ADMIN — APIXABAN 2.5 MILLIGRAM(S): 2.5 TABLET, FILM COATED ORAL at 06:35

## 2020-11-28 RX ADMIN — APIXABAN 2.5 MILLIGRAM(S): 2.5 TABLET, FILM COATED ORAL at 17:42

## 2020-11-28 RX ADMIN — PANTOPRAZOLE SODIUM 40 MILLIGRAM(S): 20 TABLET, DELAYED RELEASE ORAL at 06:35

## 2020-11-28 RX ADMIN — NYSTATIN CREAM 1 APPLICATION(S): 100000 CREAM TOPICAL at 06:35

## 2020-11-28 RX ADMIN — NYSTATIN CREAM 1 APPLICATION(S): 100000 CREAM TOPICAL at 17:42

## 2020-11-28 RX ADMIN — Medication 25 MILLIGRAM(S): at 06:35

## 2020-11-28 RX ADMIN — Medication 25 MILLIGRAM(S): at 17:42

## 2020-11-28 RX ADMIN — Medication 0.12 MILLIGRAM(S): at 06:35

## 2020-11-28 RX ADMIN — CEFTRIAXONE 100 MILLIGRAM(S): 500 INJECTION, POWDER, FOR SOLUTION INTRAMUSCULAR; INTRAVENOUS at 06:34

## 2020-11-28 NOTE — PROGRESS NOTE ADULT - ASSESSMENT
Patient is a 85y old  Female from home with PMH of HTN, spinal stenosis, scoliosis presents to ER for evaluation of  lower abdominal pain and dysuria and increased  urinary frequency. On admission, she found to have fever, tachycardia, Leukocytosis and positive Urine analysis. The CXR shows Small left base infiltrate. She has started on Ceftriaxone and the Id consult requested to assist with further evaluation and antibiotic management.     # Sepsis ( Fever + tachycardia+ Leukocytosis)- The Blood cultures have no growth to date   # UTI - urine culture grew E,.coli.  # Possible pneumonia- on CXR  # Urinary retention - > 700 cc on bladder scan- 11/23- s/p removal of alfonso catheter today, 11/25    would recommend:    1. Follow up  Urology recommendation  for urinary retention   2. Need alfonso catheter for urinary retention   3. Continue Ceftriaxone inpatient and May change to oral levaquin 500 mg daily on discharge to continue until 11/28/20  4. OOB to chair    d/w patient and Dr. Serrano     Attending Attestation:    Spent more than 45 minutes on total encounter, more than 50 % of the visit was spent counseling and/or coordinating care by the Attending physician.   Patient is a 85y old  Female from home with PMH of HTN, spinal stenosis, scoliosis presents to ER for evaluation of  lower abdominal pain and dysuria and increased  urinary frequency. On admission, she found to have fever, tachycardia, Leukocytosis and positive Urine analysis. The CXR shows Small left base infiltrate. She has started on Ceftriaxone and the Id consult requested to assist with further evaluation and antibiotic management.     # Sepsis ( Fever + tachycardia+ Leukocytosis)- The Blood cultures have no growth to date   # UTI - urine culture grew E,.coli.  # Possible pneumonia- on CXR  # Urinary retention - > 700 cc on bladder scan- 11/23- s/p removal of alfonso catheter, 11/25    would recommend:    1. Follow up  Urology recommendation  for urinary retention   2. Continue Ceftriaxone inpatient and May change to oral levaquin 500 mg daily on discharge to continue until 11/28/20  3. OOB to chair    d/w patient and Dr. Serrano     Attending Attestation:    Spent more than 45 minutes on total encounter, more than 50 % of the visit was spent counseling and/or coordinating care by the Attending physician.

## 2020-11-28 NOTE — PROGRESS NOTE ADULT - SUBJECTIVE AND OBJECTIVE BOX
CHIEF COMPLAINT:Patient is a 85y old  Female who presents with a chief complaint of abdominal pain and urinary symptoms.Pt appears comfortable.    	  REVIEW OF SYSTEMS:  CONSTITUTIONAL: No fever, weight loss, or fatigue  EYES: No eye pain, visual disturbances, or discharge  ENT:  No difficulty hearing, tinnitus, vertigo; No sinus or throat pain  NECK: No pain or stiffness  RESPIRATORY: No cough, wheezing, chills or hemoptysis; No Shortness of Breath  CARDIOVASCULAR: No chest pain, palpitations, passing out, dizziness, or leg swelling  GASTROINTESTINAL: No abdominal or epigastric pain. No nausea, vomiting, or hematemesis; No diarrhea or constipation. No melena or hematochezia.  GENITOURINARY: No dysuria, frequency, hematuria, or incontinence  NEUROLOGICAL: No headaches, memory loss, loss of strength, numbness, or tremors  SKIN: No itching, burning, rashes, or lesions   LYMPH Nodes: No enlarged glands  ENDOCRINE: No heat or cold intolerance; No hair loss  MUSCULOSKELETAL: No joint pain or swelling; No muscle, back, or extremity pain  PSYCHIATRIC: No depression, anxiety, mood swings, or difficulty sleeping  HEME/LYMPH: No easy bruising, or bleeding gums  ALLERGY AND IMMUNOLOGIC: No hives or eczema	        PHYSICAL EXAM:  T(C): 36.4 (11-28-20 @ 10:34), Max: 36.8 (11-27-20 @ 13:59)  HR: 88 (11-28-20 @ 10:34) (81 - 89)  BP: 125/58 (11-28-20 @ 10:34) (106/60 - 126/65)  RR: 18 (11-28-20 @ 10:34) (18 - 18)  SpO2: 96% (11-28-20 @ 10:34) (95% - 99%)  Wt(kg): --  I&O's Summary    27 Nov 2020 07:01  -  28 Nov 2020 07:00  --------------------------------------------------------  IN: 0 mL / OUT: 1300 mL / NET: -1300 mL        Appearance: Normal	  HEENT:   Normal oral mucosa, PERRL, EOMI	  Lymphatic: No lymphadenopathy  Cardiovascular: Normal S1 S2, No JVD, No murmurs, No edema  Respiratory: Lungs clear to auscultation	  Psychiatry: A & O x 3, Mood & affect appropriate  Gastrointestinal:  Soft, Non-tender, + BS	  Skin: No rashes, No ecchymoses, No cyanosis	  Neurologic: Non-focal  Extremities: Normal range of motion, No clubbing, cyanosis or edema  Vascular: Peripheral pulses palpable 2+ bilaterally    MEDICATIONS  (STANDING):  apixaban 2.5 milliGRAM(s) Oral two times a day  cefTRIAXone   IVPB 1000 milliGRAM(s) IV Intermittent every 24 hours  digoxin     Tablet 0.125 milliGRAM(s) Oral daily  metoprolol tartrate 25 milliGRAM(s) Oral two times a day  nystatin Ointment 1 Application(s) Topical two times a day  pantoprazole    Tablet 40 milliGRAM(s) Oral before breakfast  sodium chloride 0.9%. 1000 milliLiter(s) (80 mL/Hr) IV Continuous <Continuous>    	  	  LABS:	 	                         12.7   8.68  )-----------( 292      ( 27 Nov 2020 06:08 )             39.3     11-27    142  |  106  |  22<H>  ----------------------------<  89  4.6   |  29  |  0.85    Ca    8.9      27 Nov 2020 06:08  Phos  3.3     11-27      Lipid Profile: Cholesterol 115  LDL --  HDL 55  TG 71      TSH: Thyroid Stimulating Hormone, Serum: 1.90 uU/mL (11-23 @ 06:02)

## 2020-11-28 NOTE — PROGRESS NOTE ADULT - SUBJECTIVE AND OBJECTIVE BOX
Patient is seen and examined at the bed side, remains afebrile. She c/o not feeling well. The events noted regarding retaining Large volume of urine after removal of alfonso, even though passed TOV initially. The creatinine stay normal.         REVIEW OF SYSTEMS: All other review systems are negative      ALLERGIES: No Known Allergies      Vital Signs Last 24 Hrs  T(C): 36.5 (2020 14:03), Max: 36.8 (2020 04:52)  T(F): 97.7 (2020 14:03), Max: 98.2 (2020 04:52)  HR: 93 (:03) (81 - 93)  BP: 126/66 (2020 14:03) (117/62 - 126/66)  BP(mean): --  RR: 18 (:) (18 - 18)  SpO2: 95% (:) (95% - 99%)      PHYSICAL EXAM:  GENERAL: Not in distress   CHEST/LUNG: Not using accessory muscles   HEART: s1 and s2 present  ABDOMEN: Lower abdominal tenderness resolved  : Alfonso catheter in placed   EXTREMITIES: No pedal  edema  CNS: Awake and Alert        LABS:                                   13.8   10.78 )-----------( 377      ( 2020 12:34 )             42.9                12.7   8.68  )-----------( 292      ( 2020 06:08 )             39.3                         11.3   11.63 )-----------( 144      ( 2020 06:02 )             34.4           138  |  102  |  28<H>  ----------------------------<  91  5.3   |  32<H>  |  0.92    Ca    9.7      2020 12:34  Phos  3.3         142  |  106  |  22<H>  ----------------------------<  89  4.6   |  29  |  0.85    Ca    8.9      2020 06:08  Phos  3.3         TPro  5.8<L>  /  Alb  2.1<L>  /  TBili  0.6  /  DBili  x   /  AST  40  /  ALT  27  /  AlkPhos  70  11-      Urinalysis Basic - ( 2020 16:09 )  Color: Yellow / Appearance: Slightly Turbid / S.015 / pH: x  Gluc: x / Ketone: Negative  / Bili: Negative / Urobili: Negative   Blood: x / Protein: 100 / Nitrite: Positive   Leuk Esterase: Moderate / RBC: 5-10 /HPF / WBC >50 /HPF   Sq Epi: x / Non Sq Epi: Few /HPF / Bacteria: Moderate /HPF        MEDICATIONS  (STANDING):    apixaban 2.5 milliGRAM(s) Oral two times a day  cefTRIAXone   IVPB 1000 milliGRAM(s) IV Intermittent every 24 hours  digoxin     Tablet 0.125 milliGRAM(s) Oral daily  metoprolol tartrate 25 milliGRAM(s) Oral two times a day  nystatin Ointment 1 Application(s) Topical two times a day  pantoprazole    Tablet 40 milliGRAM(s) Oral before breakfast  sodium chloride 0.9%. 1000 milliLiter(s) (80 mL/Hr) IV Continuous <Continuous>      RADIOLOGY & ADDITIONAL TESTS:    20 : Xray Chest 1 View- PORTABLE-Urgent (Xray Chest 1 View- PORTABLE-Urgent .) (20 @ 18:06) Small left base infiltrate.        MICROBIOLOGY DATA:    Culture - Urine (20 @ 02:01)   - Amikacin: S <=16   - Amoxicillin/Clavulanic Acid: S <=8/4   - Ampicillin: S <=8 These ampicillin results predict results for amoxicillin   - Ampicillin/Sulbactam: S <=4/2 Enterobacter, Citrobacter, and Serratia may develop resistance during prolonged therapy (3-4 days)   - Aztreonam: S <=4   - Cefazolin: S <=2 (MIC_CL_COM_ENTERIC_CEFAZU) For uncomplicated UTI with K. pneumoniae, E. coli, or P. mirablis: YULY <=16 is sensitive and YULY >=32 is resistant. This also predicts results for oral agents cefaclor, cefdinir, cefpodoxime, cefprozil, cefuroxime axetil, cephalexin and locarbef for uncomplicated UTI. Note that some isolates may be susceptible to these agents while testing resistant to cefazolin.   - Cefepime: S <=2   - Cefoxitin: S <=8   - Ceftriaxone: S <=1 Enterobacter, Citrobacter, and Serratia may develop resistance during prolonged therapy   - Ciprofloxacin: S <=0.25   - Ertapenem: S <=0.5   - Gentamicin: S <=2   - Imipenem: S <=1   - Levofloxacin: S <=0.5   - Meropenem: S <=1   - Nitrofurantoin: S <=32 Should not be used to treat pyelonephritis   - Piperacillin/Tazobactam: S <=8   - Tigecycline: S <=2   - Tobramycin: S <=2   - Trimethoprim/Sulfamethoxazole: S <=0.5/9.5   Specimen Source: .Urine Clean Catch (Midstream)   Culture Results:   >100,000 CFU/ml Escherichia coli   Culture - Urine (20 @ 02:01)   Specimen Source: .Urine Clean Catch (Midstream)   Culture Results: >100,000 CFU/ml Escherichia coli     Culture - Blood (20 @ 22:16)   Specimen Source: .Blood Blood   Culture Results:  No growth to date.     Culture - Blood (20 @ 22:16)   Specimen Source: .Blood Blood   Culture Results: No growth to date.     Respiratory Viral Panel with COVID-19 by [facility] (20 @ 16:46)   Rapid RVP Result: Chelseatenavin   SARS-CoV-2: NotDetec:            Patient is seen and examined at the bed side, remains afebrile. She is doing better. The WBC count and creatinine stay  normal.        REVIEW OF SYSTEMS: All other review systems are negative      ALLERGIES: No Known Allergies      Vital Signs Last 24 Hrs  T(C): 36.5 (2020 14:03), Max: 36.8 (2020 04:52)  T(F): 97.7 (2020 14:03), Max: 98.2 (2020 04:52)  HR: 93 (:03) (81 - 93)  BP: 126/66 (2020 14:03) (117/62 - 126/66)  BP(mean): --  RR: 18 (:) (18 - 18)  SpO2: 95% (:) (95% - 99%)      PHYSICAL EXAM:  GENERAL: Not in distress   CHEST/LUNG: Not using accessory muscles   HEART: s1 and s2 present  ABDOMEN: Lower abdominal tenderness resolved  EXTREMITIES: No pedal  edema  CNS: Awake and Alert        LABS:                                   13.8   10.78 )-----------( 377      ( 2020 12:34 )             42.9                12.7   8.68  )-----------( 292      ( 2020 06:08 )             39.3                         11.3   11.63 )-----------( 144      ( 2020 06:02 )             34.4       11    138  |  102  |  28<H>  ----------------------------<  91  5.3   |  32<H>  |  0.92    Ca    9.7      2020 12:34  Phos  3.3     11-      11    142  |  106  |  22<H>  ----------------------------<  89  4.6   |  29  |  0.85    Ca    8.9      2020 06:08  Phos  3.3         TPro  5.8<L>  /  Alb  2.1<L>  /  TBili  0.6  /  DBili  x   /  AST  40  /  ALT  27  /  AlkPhos  70  11-23      Urinalysis Basic - ( 2020 16:09 )  Color: Yellow / Appearance: Slightly Turbid / S.015 / pH: x  Gluc: x / Ketone: Negative  / Bili: Negative / Urobili: Negative   Blood: x / Protein: 100 / Nitrite: Positive   Leuk Esterase: Moderate / RBC: 5-10 /HPF / WBC >50 /HPF   Sq Epi: x / Non Sq Epi: Few /HPF / Bacteria: Moderate /HPF        MEDICATIONS  (STANDING):    apixaban 2.5 milliGRAM(s) Oral two times a day  cefTRIAXone   IVPB 1000 milliGRAM(s) IV Intermittent every 24 hours  digoxin     Tablet 0.125 milliGRAM(s) Oral daily  metoprolol tartrate 25 milliGRAM(s) Oral two times a day  nystatin Ointment 1 Application(s) Topical two times a day  pantoprazole    Tablet 40 milliGRAM(s) Oral before breakfast  sodium chloride 0.9%. 1000 milliLiter(s) (80 mL/Hr) IV Continuous <Continuous>      RADIOLOGY & ADDITIONAL TESTS:    20 : Xray Chest 1 View- PORTABLE-Urgent (Xray Chest 1 View- PORTABLE-Urgent .) (20 @ 18:06) Small left base infiltrate.        MICROBIOLOGY DATA:    Culture - Urine (20 @ 02:01)   - Amikacin: S <=16   - Amoxicillin/Clavulanic Acid: S <=8/4   - Ampicillin: S <=8 These ampicillin results predict results for amoxicillin   - Ampicillin/Sulbactam: S <=4/2 Enterobacter, Citrobacter, and Serratia may develop resistance during prolonged therapy (3-4 days)   - Aztreonam: S <=4   - Cefazolin: S <=2 (MIC_CL_COM_ENTERIC_CEFAZU) For uncomplicated UTI with K. pneumoniae, E. coli, or P. mirablis: YULY <=16 is sensitive and YULY >=32 is resistant. This also predicts results for oral agents cefaclor, cefdinir, cefpodoxime, cefprozil, cefuroxime axetil, cephalexin and locarbef for uncomplicated UTI. Note that some isolates may be susceptible to these agents while testing resistant to cefazolin.   - Cefepime: S <=2   - Cefoxitin: S <=8   - Ceftriaxone: S <=1 Enterobacter, Citrobacter, and Serratia may develop resistance during prolonged therapy   - Ciprofloxacin: S <=0.25   - Ertapenem: S <=0.5   - Gentamicin: S <=2   - Imipenem: S <=1   - Levofloxacin: S <=0.5   - Meropenem: S <=1   - Nitrofurantoin: S <=32 Should not be used to treat pyelonephritis   - Piperacillin/Tazobactam: S <=8   - Tigecycline: S <=2   - Tobramycin: S <=2   - Trimethoprim/Sulfamethoxazole: S <=0.5/9.5   Specimen Source: .Urine Clean Catch (Midstream)   Culture Results:   >100,000 CFU/ml Escherichia coli   Culture - Urine (20 @ 02:01)   Specimen Source: .Urine Clean Catch (Midstream)   Culture Results: >100,000 CFU/ml Escherichia coli     Culture - Blood (20 @ 22:16)   Specimen Source: .Blood Blood   Culture Results:  No growth to date.     Culture - Blood (20 @ 22:16)   Specimen Source: .Blood Blood   Culture Results: No growth to date.     Respiratory Viral Panel with COVID-19 by [facility] (20 @ 16:46)   Rapid RVP Result: Chelseatenavin   SARS-CoV-2: NotDetec:

## 2020-11-28 NOTE — PROGRESS NOTE ADULT - SUBJECTIVE AND OBJECTIVE BOX
JOSE GARDNER  MR# [id number]  85yFemale        Patient is a 85y old  Female who presents with a chief complaint of abdominal pain and urinary symptoms (28 Nov 2020 18:23)      INTERVAL HPI/OVERNIGHT EVENTS:  Patient seen and examined at bedside. No notations of chest pain, palpitation, SOB, orthopnea, nausea, vomiting or abdominal pain.    ALLERGIES  No Known Allergies      MEDICATIONS  acetaminophen   Tablet .. 650 milliGRAM(s) Oral every 6 hours PRN Temp greater or equal to 38C (100.4F)  apixaban 2.5 milliGRAM(s) Oral two times a day  cefTRIAXone   IVPB 1000 milliGRAM(s) IV Intermittent every 24 hours  digoxin     Tablet 0.125 milliGRAM(s) Oral daily  metoprolol tartrate 25 milliGRAM(s) Oral two times a day  nystatin Ointment 1 Application(s) Topical two times a day  pantoprazole    Tablet 40 milliGRAM(s) Oral before breakfast  sodium chloride 0.9%. 1000 milliLiter(s) IV Continuous <Continuous>              REVIEW OF SYSTEMS:  CONSTITUTIONAL: No fever, weight loss, or fatigue  EYES: No eye pain, visual disturbances, or discharge  ENT:  No difficulty hearing, tinnitus, vertigo; No sinus or throat pain  NECK: No pain or stiffness  RESPIRATORY: No cough, wheezing, chills or hemoptysis; No Shortness of Breath  CARDIOVASCULAR: No chest pain, palpitations, passing out, dizziness, or leg swelling  GASTROINTESTINAL: No abdominal or epigastric pain. No nausea, vomiting, or hematemesis; No diarrhea or constipation. No melena or hematochezia.  GENITOURINARY: No dysuria, frequency, hematuria, or incontinence  NEUROLOGICAL: No headaches, memory loss, loss of strength, numbness, or tremors  SKIN: No itching, burning, rashes, or lesions   LYMPH Nodes: No enlarged glands  ENDOCRINE: No heat or cold intolerance; No hair loss  MUSCULOSKELETAL: No joint pain or swelling; No muscle, back, or extremity pain  PSYCHIATRIC: No depression, anxiety, mood swings, or difficulty sleeping  HEME/LYMPH: No easy bruising, or bleeding gums  ALLERGY AND IMMUNOLOGIC: No hives or eczema	    [ ] All others negative	  [ ] Unable to obtain      T(C): 36.5 (11-28-20 @ 14:03), Max: 36.8 (11-28-20 @ 04:52)  T(F): 97.7 (11-28-20 @ 14:03), Max: 98.2 (11-28-20 @ 04:52)  HR: 93 (11-28-20 @ 14:03) (81 - 93)  BP: 126/66 (11-28-20 @ 14:03) (117/62 - 126/66)  RR: 18 (11-28-20 @ 14:03) (18 - 18)  SpO2: 95% (11-28-20 @ 14:03) (95% - 99%)  Wt(kg): --    I&O's Summary    27 Nov 2020 07:01  -  28 Nov 2020 07:00  --------------------------------------------------------  IN: 0 mL / OUT: 1300 mL / NET: -1300 mL          PHYSICAL EXAM:  A X O x  HEAD:  Atraumatic, Normocephalic  EYES: EOMI, PERRLA, conjunctiva and sclera clear  NECK: Supple, No JVD, Normal thyroid  Resp: CTAB, No crackles, wheezing,   CVS: Regular rate and rhythm; No discernable murmurs, rubs, or gallops  ABD: Soft, Nontender, Nondistended; Bowel sounds present  EXTREMITIES:  2+ Peripheral Pulses, No edema  LYMPH: No dicernable lymphadenopathy noted  GENERAL: NAD, well-groomed, well-developed      LABS:                        13.8   10.78 )-----------( 377      ( 28 Nov 2020 12:34 )             42.9     11-28    138  |  102  |  28<H>  ----------------------------<  91  5.3   |  32<H>  |  0.92    Ca    9.7      28 Nov 2020 12:34  Phos  3.3     11-27          CAPILLARY BLOOD GLUCOSE          Troponins:  ProBNP:  Lipid Profile:   HgA1c:  TSH:           RADIOLOGY & ADDITIONAL TESTS:    Imaging Personally Reviewed:  [ ] YES  [ ] NO      Consultant(s) Notes Reviewed:  [x ] YES  [ ] NO    Care Discussed with Consultants/Other Providers [ x] YES  [ ] NO          PAST MEDICAL & SURGICAL HISTORY:  Spinal stenosis, unspecified spinal region    Scoliosis, unspecified scoliosis type, unspecified spinal region    Cataract of both eyes, unspecified cataract type          Sepsis    No pertinent family history in first degree relatives    Handoff    MEWS Score    Spinal stenosis, unspecified spinal region    Scoliosis, unspecified scoliosis type, unspecified spinal region    Sepsis without acute organ dysfunction, due to unspecified organism    Discharge planning issues    Hypoalbuminemia due to protein-calorie malnutrition    Urinary retention    Paroxysmal atrial fibrillation with RVR    HTN (hypertension)    Need for prophylactic measure    Sepsis secondary to UTI    Cataract of both eyes, unspecified cataract type    A ABDOMINAL PAIN    Urinary tract infection without hematuria, site unspecified    SysAdmin_VisitLink

## 2020-11-28 NOTE — PROGRESS NOTE ADULT - ASSESSMENT
85 yr F from home with PMH of HTN, spinal stenosis, scoliosis presents to ED with complaints of  lower abdominal pain and burning with urination since 4 days,UTI,PAF with RVR.  1.SANTOSH now pt refusing to go.  2.UTI-ABX.  3.PAF-eliquis,lopressor,dig .125mg qd.  4.Urinary retention-alfonso.  5.PPI.

## 2020-11-29 LAB
ANION GAP SERPL CALC-SCNC: 10 MMOL/L — SIGNIFICANT CHANGE UP (ref 5–17)
BUN SERPL-MCNC: 36 MG/DL — HIGH (ref 7–18)
CALCIUM SERPL-MCNC: 9 MG/DL — SIGNIFICANT CHANGE UP (ref 8.4–10.5)
CHLORIDE SERPL-SCNC: 102 MMOL/L — SIGNIFICANT CHANGE UP (ref 96–108)
CO2 SERPL-SCNC: 27 MMOL/L — SIGNIFICANT CHANGE UP (ref 22–31)
CREAT SERPL-MCNC: 1.06 MG/DL — SIGNIFICANT CHANGE UP (ref 0.5–1.3)
GLUCOSE SERPL-MCNC: 86 MG/DL — SIGNIFICANT CHANGE UP (ref 70–99)
HCT VFR BLD CALC: 41 % — SIGNIFICANT CHANGE UP (ref 34.5–45)
HGB BLD-MCNC: 13.3 G/DL — SIGNIFICANT CHANGE UP (ref 11.5–15.5)
MCHC RBC-ENTMCNC: 30.6 PG — SIGNIFICANT CHANGE UP (ref 27–34)
MCHC RBC-ENTMCNC: 32.4 GM/DL — SIGNIFICANT CHANGE UP (ref 32–36)
MCV RBC AUTO: 94.3 FL — SIGNIFICANT CHANGE UP (ref 80–100)
NRBC # BLD: 0 /100 WBCS — SIGNIFICANT CHANGE UP (ref 0–0)
PLATELET # BLD AUTO: 361 K/UL — SIGNIFICANT CHANGE UP (ref 150–400)
POTASSIUM SERPL-MCNC: 4.9 MMOL/L — SIGNIFICANT CHANGE UP (ref 3.5–5.3)
POTASSIUM SERPL-SCNC: 4.9 MMOL/L — SIGNIFICANT CHANGE UP (ref 3.5–5.3)
RBC # BLD: 4.35 M/UL — SIGNIFICANT CHANGE UP (ref 3.8–5.2)
RBC # FLD: 13.2 % — SIGNIFICANT CHANGE UP (ref 10.3–14.5)
SODIUM SERPL-SCNC: 139 MMOL/L — SIGNIFICANT CHANGE UP (ref 135–145)
WBC # BLD: 11.41 K/UL — HIGH (ref 3.8–10.5)
WBC # FLD AUTO: 11.41 K/UL — HIGH (ref 3.8–10.5)

## 2020-11-29 RX ORDER — ACETAMINOPHEN 500 MG
650 TABLET ORAL EVERY 6 HOURS
Refills: 0 | Status: DISCONTINUED | OUTPATIENT
Start: 2020-11-29 | End: 2020-12-11

## 2020-11-29 RX ADMIN — APIXABAN 2.5 MILLIGRAM(S): 2.5 TABLET, FILM COATED ORAL at 05:45

## 2020-11-29 RX ADMIN — Medication 25 MILLIGRAM(S): at 17:19

## 2020-11-29 RX ADMIN — Medication 0.12 MILLIGRAM(S): at 05:45

## 2020-11-29 RX ADMIN — PANTOPRAZOLE SODIUM 40 MILLIGRAM(S): 20 TABLET, DELAYED RELEASE ORAL at 05:50

## 2020-11-29 RX ADMIN — Medication 650 MILLIGRAM(S): at 02:59

## 2020-11-29 RX ADMIN — CEFTRIAXONE 100 MILLIGRAM(S): 500 INJECTION, POWDER, FOR SOLUTION INTRAMUSCULAR; INTRAVENOUS at 05:44

## 2020-11-29 RX ADMIN — NYSTATIN CREAM 1 APPLICATION(S): 100000 CREAM TOPICAL at 17:23

## 2020-11-29 RX ADMIN — NYSTATIN CREAM 1 APPLICATION(S): 100000 CREAM TOPICAL at 09:45

## 2020-11-29 RX ADMIN — APIXABAN 2.5 MILLIGRAM(S): 2.5 TABLET, FILM COATED ORAL at 17:18

## 2020-11-29 RX ADMIN — Medication 650 MILLIGRAM(S): at 22:51

## 2020-11-29 RX ADMIN — Medication 25 MILLIGRAM(S): at 05:45

## 2020-11-29 RX ADMIN — Medication 650 MILLIGRAM(S): at 01:12

## 2020-11-29 NOTE — PROGRESS NOTE ADULT - SUBJECTIVE AND OBJECTIVE BOX
Patient is seen and examined at the bed side, remains afebrile. She is doing better. The WBC count and creatinine stay  normal.      REVIEW OF SYSTEMS: All other review systems are negative      ALLERGIES: No Known Allergies      Vital Signs Last 24 Hrs  T(C): 36.7 (2020 14:52), Max: 36.7 (2020 14:52)  T(F): 98 (2020 14:52), Max: 98 (2020 14:52)  HR: 82 (2020 14:52) (82 - 93)  BP: 117/69 (2020 14:52) (110/70 - 119/63)  BP(mean): --  RR: 19 (2020 14:52) (18 - 19)  SpO2: 95% (2020 14:52) (95% - 96%)        PHYSICAL EXAM:  GENERAL: Not in distress   CHEST/LUNG: Not using accessory muscles   HEART: s1 and s2 present  ABDOMEN: Lower abdominal tenderness resolved  EXTREMITIES: No pedal  edema  CNS: Awake and Alert        LABS:                                   13.3   11.41 )-----------( 361      ( 2020 07:35 )             41.0                  12.7   8.68  )-----------( 292      ( 2020 06:08 )             39.3                         11.3   11.63 )-----------( 144      ( 2020 06:02 )             34.4           139  |  102  |  36<H>  ----------------------------<  86  4.9   |  27  |  1.06    Ca    9.0      2020 07:35      11-    138  |  102  |  28<H>  ----------------------------<  91  5.3   |  32<H>  |  0.92    Ca    9.7      2020 12:34    TPro  5.8<L>  /  Alb  2.1<L>  /  TBili  0.6  /  DBili  x   /  AST  40  /  ALT  27  /  AlkPhos  70  11-23      Urinalysis Basic - ( 2020 16:09 )  Color: Yellow / Appearance: Slightly Turbid / S.015 / pH: x  Gluc: x / Ketone: Negative  / Bili: Negative / Urobili: Negative   Blood: x / Protein: 100 / Nitrite: Positive   Leuk Esterase: Moderate / RBC: 5-10 /HPF / WBC >50 /HPF   Sq Epi: x / Non Sq Epi: Few /HPF / Bacteria: Moderate /HPF        MEDICATIONS  (STANDING):    apixaban 2.5 milliGRAM(s) Oral two times a day  digoxin     Tablet 0.125 milliGRAM(s) Oral daily  metoprolol tartrate 25 milliGRAM(s) Oral two times a day  nystatin Ointment 1 Application(s) Topical two times a day  pantoprazole    Tablet 40 milliGRAM(s) Oral before breakfast  sodium chloride 0.9%. 1000 milliLiter(s) (80 mL/Hr) IV Continuous <Continuous>        RADIOLOGY & ADDITIONAL TESTS:    20 : Xray Chest 1 View- PORTABLE-Urgent (Xray Chest 1 View- PORTABLE-Urgent .) (20 @ 18:06) Small left base infiltrate.        MICROBIOLOGY DATA:    Culture - Urine (20 @ 02:01)   - Amikacin: S <=16   - Amoxicillin/Clavulanic Acid: S <=8/4   - Ampicillin: S <=8 These ampicillin results predict results for amoxicillin   - Ampicillin/Sulbactam: S <=4/2 Enterobacter, Citrobacter, and Serratia may develop resistance during prolonged therapy (3-4 days)   - Aztreonam: S <=4   - Cefazolin: S <=2 (MIC_CL_COM_ENTERIC_CEFAZU) For uncomplicated UTI with K. pneumoniae, E. coli, or P. mirablis: YULY <=16 is sensitive and YULY >=32 is resistant. This also predicts results for oral agents cefaclor, cefdinir, cefpodoxime, cefprozil, cefuroxime axetil, cephalexin and locarbef for uncomplicated UTI. Note that some isolates may be susceptible to these agents while testing resistant to cefazolin.   - Cefepime: S <=2   - Cefoxitin: S <=8   - Ceftriaxone: S <=1 Enterobacter, Citrobacter, and Serratia may develop resistance during prolonged therapy   - Ciprofloxacin: S <=0.25   - Ertapenem: S <=0.5   - Gentamicin: S <=2   - Imipenem: S <=1   - Levofloxacin: S <=0.5   - Meropenem: S <=1   - Nitrofurantoin: S <=32 Should not be used to treat pyelonephritis   - Piperacillin/Tazobactam: S <=8   - Tigecycline: S <=2   - Tobramycin: S <=2   - Trimethoprim/Sulfamethoxazole: S <=0.5/9.5   Specimen Source: .Urine Clean Catch (Midstream)   Culture Results:   >100,000 CFU/ml Escherichia coli   Culture - Urine (20 @ 02:01)   Specimen Source: .Urine Clean Catch (Midstream)   Culture Results: >100,000 CFU/ml Escherichia coli     Culture - Blood (20 @ 22:16)   Specimen Source: .Blood Blood   Culture Results:  No growth to date.     Culture - Blood (20 @ 22:16)   Specimen Source: .Blood Blood   Culture Results: No growth to date.     Respiratory Viral Panel with COVID-19 by [facility] (20 @ 16:46)   Rapid RVP Result: NotDetec   SARS-CoV-2: NotDetec:              Patient is seen and examined at the bed side, remains afebrile. She is doing better physically but worried about not able to back to her home.  The WBC count and creatinine stay  normal.      REVIEW OF SYSTEMS: All other review systems are negative      ALLERGIES: No Known Allergies      Vital Signs Last 24 Hrs  T(C): 36.7 (2020 14:52), Max: 36.7 (2020 14:52)  T(F): 98 (2020 14:52), Max: 98 (2020 14:52)  HR: 82 (2020 14:52) (82 - 93)  BP: 117/69 (2020 14:52) (110/70 - 119/63)  BP(mean): --  RR: 19 (:52) (18 - 19)  SpO2: 95% (2020 14:52) (95% - 96%)        PHYSICAL EXAM:  GENERAL: Not in distress   CHEST/LUNG: Not using accessory muscles   HEART: s1 and s2 present  ABDOMEN: Lower abdominal tenderness resolved  EXTREMITIES: No pedal  edema  CNS: Awake and Alert        LABS:                                   13.3   11.41 )-----------( 361      ( 2020 07:35 )             41.0                  12.7   8.68  )-----------( 292      ( 2020 06:08 )             39.3                         11.3   11.63 )-----------( 144      ( 2020 06:02 )             34.4       11-    139  |  102  |  36<H>  ----------------------------<  86  4.9   |  27  |  1.06    Ca    9.0      2020 07:35      11-    138  |  102  |  28<H>  ----------------------------<  91  5.3   |  32<H>  |  0.92    Ca    9.7      2020 12:34    TPro  5.8<L>  /  Alb  2.1<L>  /  TBili  0.6  /  DBili  x   /  AST  40  /  ALT  27  /  AlkPhos  70  11-23      Urinalysis Basic - ( 2020 16:09 )  Color: Yellow / Appearance: Slightly Turbid / S.015 / pH: x  Gluc: x / Ketone: Negative  / Bili: Negative / Urobili: Negative   Blood: x / Protein: 100 / Nitrite: Positive   Leuk Esterase: Moderate / RBC: 5-10 /HPF / WBC >50 /HPF   Sq Epi: x / Non Sq Epi: Few /HPF / Bacteria: Moderate /HPF        MEDICATIONS  (STANDING):    apixaban 2.5 milliGRAM(s) Oral two times a day  digoxin     Tablet 0.125 milliGRAM(s) Oral daily  metoprolol tartrate 25 milliGRAM(s) Oral two times a day  nystatin Ointment 1 Application(s) Topical two times a day  pantoprazole    Tablet 40 milliGRAM(s) Oral before breakfast  sodium chloride 0.9%. 1000 milliLiter(s) (80 mL/Hr) IV Continuous <Continuous>        RADIOLOGY & ADDITIONAL TESTS:    20 : Xray Chest 1 View- PORTABLE-Urgent (Xray Chest 1 View- PORTABLE-Urgent .) (20 @ 18:06) Small left base infiltrate.        MICROBIOLOGY DATA:    Culture - Urine (20 @ 02:01)   - Amikacin: S <=16   - Amoxicillin/Clavulanic Acid: S <=8/4   - Ampicillin: S <=8 These ampicillin results predict results for amoxicillin   - Ampicillin/Sulbactam: S <=4/2 Enterobacter, Citrobacter, and Serratia may develop resistance during prolonged therapy (3-4 days)   - Aztreonam: S <=4   - Cefazolin: S <=2 (MIC_CL_COM_ENTERIC_CEFAZU) For uncomplicated UTI with K. pneumoniae, E. coli, or P. mirablis: YULY <=16 is sensitive and YULY >=32 is resistant. This also predicts results for oral agents cefaclor, cefdinir, cefpodoxime, cefprozil, cefuroxime axetil, cephalexin and locarbef for uncomplicated UTI. Note that some isolates may be susceptible to these agents while testing resistant to cefazolin.   - Cefepime: S <=2   - Cefoxitin: S <=8   - Ceftriaxone: S <=1 Enterobacter, Citrobacter, and Serratia may develop resistance during prolonged therapy   - Ciprofloxacin: S <=0.25   - Ertapenem: S <=0.5   - Gentamicin: S <=2   - Imipenem: S <=1   - Levofloxacin: S <=0.5   - Meropenem: S <=1   - Nitrofurantoin: S <=32 Should not be used to treat pyelonephritis   - Piperacillin/Tazobactam: S <=8   - Tigecycline: S <=2   - Tobramycin: S <=2   - Trimethoprim/Sulfamethoxazole: S <=0.5/9.5   Specimen Source: .Urine Clean Catch (Midstream)   Culture Results:   >100,000 CFU/ml Escherichia coli   Culture - Urine (20 @ 02:01)   Specimen Source: .Urine Clean Catch (Midstream)   Culture Results: >100,000 CFU/ml Escherichia coli     Culture - Blood (20 @ 22:16)   Specimen Source: .Blood Blood   Culture Results:  No growth to date.     Culture - Blood (20 @ 22:16)   Specimen Source: .Blood Blood   Culture Results: No growth to date.     Respiratory Viral Panel with COVID-19 by [facility] (20 @ 16:46)   Rapid RVP Result: NotDetec   SARS-CoV-2: NotDetec:

## 2020-11-29 NOTE — PROGRESS NOTE ADULT - ASSESSMENT
85 yr F from home with PMH of HTN, spinal stenosis, scoliosis presents to ED with complaints of  lower abdominal pain and burning with urination since 4 days,UTI,PAF with RVR.  1.[facility] now pt refusing to go.[facility] f/u.  2.UTI-ABX completed.  3.PAF-eliquis,lopressor,dig .125mg qd.  4.Urinary retention-alfonso.  5.PPI.

## 2020-11-29 NOTE — PROGRESS NOTE ADULT - SUBJECTIVE AND OBJECTIVE BOX
CHIEF COMPLAINT:Patient is a 85y old  Female who presents with a chief complaint of abdominal pain and urinary symptoms.Pt appears comfortable.    	  REVIEW OF SYSTEMS:  CONSTITUTIONAL: No fever, weight loss, or fatigue  EYES: No eye pain, visual disturbances, or discharge  ENT:  No difficulty hearing, tinnitus, vertigo; No sinus or throat pain  NECK: No pain or stiffness  RESPIRATORY: No cough, wheezing, chills or hemoptysis; No Shortness of Breath  CARDIOVASCULAR: No chest pain, palpitations, passing out, dizziness, or leg swelling  GASTROINTESTINAL: No abdominal or epigastric pain. No nausea, vomiting, or hematemesis; No diarrhea or constipation. No melena or hematochezia.  GENITOURINARY: No dysuria, frequency, hematuria, or incontinence  NEUROLOGICAL: No headaches, memory loss, loss of strength, numbness, or tremors  SKIN: No itching, burning, rashes, or lesions   LYMPH Nodes: No enlarged glands  ENDOCRINE: No heat or cold intolerance; No hair loss  MUSCULOSKELETAL: No joint pain or swelling; No muscle, back, or extremity pain  PSYCHIATRIC: No depression, anxiety, mood swings, or difficulty sleeping  HEME/LYMPH: No easy bruising, or bleeding gums  ALLERGY AND IMMUNOLOGIC: No hives or eczema	      PHYSICAL EXAM:  T(C): 36.1 (11-29-20 @ 05:41), Max: 36.6 (11-28-20 @ 20:47)  HR: 86 (11-29-20 @ 05:41) (86 - 93)  BP: 110/70 (11-29-20 @ 05:41) (110/70 - 126/66)  RR: 18 (11-29-20 @ 05:41) (18 - 18)  SpO2: 96% (11-29-20 @ 05:41) (95% - 96%)    I&O's Summary    28 Nov 2020 07:01  -  29 Nov 2020 07:00  --------------------------------------------------------  IN: 0 mL / OUT: 450 mL / NET: -450 mL        Appearance: Normal	  HEENT:   Normal oral mucosa, PERRL, EOMI	  Lymphatic: No lymphadenopathy  Cardiovascular: Normal S1 S2, No JVD, No murmurs, No edema  Respiratory: Lungs clear to auscultation	  Psychiatry: A & O x 3, Mood & affect appropriate  Gastrointestinal:  Soft, Non-tender, + BS	  Skin: No rashes, No ecchymoses, No cyanosis	  Neurologic: Non-focal  Extremities: Normal range of motion, No clubbing, cyanosis or edema  Vascular: Peripheral pulses palpable 2+ bilaterally    MEDICATIONS  (STANDING):  apixaban 2.5 milliGRAM(s) Oral two times a day  digoxin     Tablet 0.125 milliGRAM(s) Oral daily  metoprolol tartrate 25 milliGRAM(s) Oral two times a day  nystatin Ointment 1 Application(s) Topical two times a day  pantoprazole    Tablet 40 milliGRAM(s) Oral before breakfast  sodium chloride 0.9%. 1000 milliLiter(s) (80 mL/Hr) IV Continuous <Continuous>    	  	  LABS:	 	                      13.3   11.41 )-----------( 361      ( 29 Nov 2020 07:35 )             41.0     11-29    139  |  102  |  36<H>  ----------------------------<  86  4.9   |  27  |  1.06    Ca    9.0      29 Nov 2020 07:35        Lipid Profile: Cholesterol 115  LDL --  HDL 55  TG 71      TSH: Thyroid Stimulating Hormone, Serum: 1.90 uU/mL (11-23 @ 06:02)

## 2020-11-29 NOTE — PROGRESS NOTE ADULT - ASSESSMENT
Patient is a 85y old  Female from home with PMH of HTN, spinal stenosis, scoliosis presents to ER for evaluation of  lower abdominal pain and dysuria and increased  urinary frequency. On admission, she found to have fever, tachycardia, Leukocytosis and positive Urine analysis. The CXR shows Small left base infiltrate. She has started on Ceftriaxone and the Id consult requested to assist with further evaluation and antibiotic management.     # Sepsis ( Fever + tachycardia+ Leukocytosis)- The Blood cultures have no growth to date   # UTI - urine culture grew E,.coli.  # Possible pneumonia- on CXR  # Urinary retention - > 700 cc on bladder scan- 11/23- s/p removal of alfonso catheter, 11/25    would recommend:    1. Urology recommendation  for urinary retention still pending  2. Monitor OFF ABx, s/p completed the course  3. OOB to chair    d/w patient and Dr. Serrano     Attending Attestation:    Spent more than 45 minutes on total encounter, more than 50 % of the visit was spent counseling and/or coordinating care by the Attending physician. Patient is a 85y old  Female from home with PMH of HTN, spinal stenosis, scoliosis presents to ER for evaluation of  lower abdominal pain and dysuria and increased  urinary frequency. On admission, she found to have fever, tachycardia, Leukocytosis and positive Urine analysis. The CXR shows Small left base infiltrate. She has started on Ceftriaxone and the Id consult requested to assist with further evaluation and antibiotic management.     # Sepsis ( Fever + tachycardia+ Leukocytosis)- The Blood cultures have no growth to date   # UTI - urine culture grew E,.coli.  # Possible pneumonia- on CXR  # Urinary retention - > 700 cc on bladder scan- 11/23- s/p removal of alfonso catheter, 11/25    would recommend:    1.  and  evaluation, patient likes to go back to her home  2. Monitor OFF ABx, s/p completed the course  3. OOB to chair    d/w patient and Nursing staff    Attending Attestation:    Spent more than 40 minutes on total encounter, more than 50 % of the visit was spent counseling and/or coordinating care by the Attending physician.

## 2020-11-29 NOTE — PROGRESS NOTE ADULT - SUBJECTIVE AND OBJECTIVE BOX
JOSE GARDNER  MR# [id number]  85yFemale        Patient is a 85y old  Female who presents with a chief complaint of abdominal pain and urinary symptoms (29 Nov 2020 16:17)      INTERVAL HPI/OVERNIGHT EVENTS:  Patient seen and examined at bedside. No notations of chest pain, palpitation, SOB, orthopnea, nausea, vomiting or abdominal pain.    ALLERGIES  No Known Allergies      MEDICATIONS  acetaminophen   Tablet .. 650 milliGRAM(s) Oral every 6 hours PRN Temp greater or equal to 38C (100.4F)  acetaminophen   Tablet .. 650 milliGRAM(s) Oral every 6 hours PRN Mild Pain (1 - 3), Moderate Pain (4 - 6)  apixaban 2.5 milliGRAM(s) Oral two times a day  digoxin     Tablet 0.125 milliGRAM(s) Oral daily  metoprolol tartrate 25 milliGRAM(s) Oral two times a day  nystatin Ointment 1 Application(s) Topical two times a day  pantoprazole    Tablet 40 milliGRAM(s) Oral before breakfast  sodium chloride 0.9%. 1000 milliLiter(s) IV Continuous <Continuous>              REVIEW OF SYSTEMS:  CONSTITUTIONAL: No fever, weight loss, or fatigue  EYES: No eye pain, visual disturbances, or discharge  ENT:  No difficulty hearing, tinnitus, vertigo; No sinus or throat pain  NECK: No pain or stiffness  RESPIRATORY: No cough, wheezing, chills or hemoptysis; No Shortness of Breath  CARDIOVASCULAR: No chest pain, palpitations, passing out, dizziness, or leg swelling  GASTROINTESTINAL: No abdominal or epigastric pain. No nausea, vomiting, or hematemesis; No diarrhea or constipation. No melena or hematochezia.  GENITOURINARY: No dysuria, frequency, hematuria, or incontinence  NEUROLOGICAL: No headaches, memory loss, loss of strength, numbness, or tremors  SKIN: No itching, burning, rashes, or lesions   LYMPH Nodes: No enlarged glands  ENDOCRINE: No heat or cold intolerance; No hair loss  MUSCULOSKELETAL: No joint pain or swelling; No muscle, back, or extremity pain  PSYCHIATRIC: No depression, anxiety, mood swings, or difficulty sleeping  HEME/LYMPH: No easy bruising, or bleeding gums  ALLERGY AND IMMUNOLOGIC: No hives or eczema	    [ ] All others negative	  [ ] Unable to obtain      T(C): 36.7 (11-29-20 @ 14:52), Max: 36.7 (11-29-20 @ 14:52)  T(F): 98 (11-29-20 @ 14:52), Max: 98 (11-29-20 @ 14:52)  HR: 82 (11-29-20 @ 14:52) (82 - 93)  BP: 117/69 (11-29-20 @ 14:52) (110/70 - 119/63)  RR: 19 (11-29-20 @ 14:52) (18 - 19)  SpO2: 95% (11-29-20 @ 14:52) (95% - 96%)  Wt(kg): --    I&O's Summary    28 Nov 2020 07:01  -  29 Nov 2020 07:00  --------------------------------------------------------  IN: 0 mL / OUT: 450 mL / NET: -450 mL          PHYSICAL EXAM:  A X O x  HEAD:  Atraumatic, Normocephalic  EYES: EOMI, PERRLA, conjunctiva and sclera clear  NECK: Supple, No JVD, Normal thyroid  Resp: CTAB, No crackles, wheezing,   CVS: Regular rate and rhythm; No discernable murmurs, rubs, or gallops  ABD: Soft, Nontender, Nondistended; Bowel sounds present  EXTREMITIES:  2+ Peripheral Pulses, No edema  LYMPH: No dicernable lymphadenopathy noted  GENERAL: NAD, well-groomed, well-developed      LABS:                        13.3   11.41 )-----------( 361      ( 29 Nov 2020 07:35 )             41.0     11-29    139  |  102  |  36<H>  ----------------------------<  86  4.9   |  27  |  1.06    Ca    9.0      29 Nov 2020 07:35          CAPILLARY BLOOD GLUCOSE          Troponins:  ProBNP:  Lipid Profile:   HgA1c:  TSH:           RADIOLOGY & ADDITIONAL TESTS:    Imaging Personally Reviewed:  [ ] YES  [ ] NO      Consultant(s) Notes Reviewed:  [x ] YES  [ ] NO    Care Discussed with Consultants/Other Providers [ x] YES  [ ] NO          PAST MEDICAL & SURGICAL HISTORY:  Spinal stenosis, unspecified spinal region    Scoliosis, unspecified scoliosis type, unspecified spinal region    Cataract of both eyes, unspecified cataract type          Sepsis    No pertinent family history in first degree relatives    Handoff    MEWS Score    Spinal stenosis, unspecified spinal region    Scoliosis, unspecified scoliosis type, unspecified spinal region    Sepsis without acute organ dysfunction, due to unspecified organism    Discharge planning issues    Hypoalbuminemia due to protein-calorie malnutrition    Urinary retention    Paroxysmal atrial fibrillation with RVR    HTN (hypertension)    Need for prophylactic measure    Sepsis secondary to UTI    Cataract of both eyes, unspecified cataract type    A ABDOMINAL PAIN    Urinary tract infection without hematuria, site unspecified    [email or web address]_VisitLink

## 2020-11-29 NOTE — PROGRESS NOTE ADULT - ASSESSMENT
85 yr F from home with PMH of HTN, spinal stenosis, scoliosis presents to ED with complaints of  lower abdominal pain and burning with urination since 4 days. Pt reports increased urge to urinate and burning with urination beginning four days ago. Pt also complains dull lower abdominal pain when she urinates, 6/10 in severity and non-radiating. 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 Pt denies blood in urine, diarrhea, vomiting, chest pain, difficulty breathing, fevers, or recent sick contacts.    Pt with positive UA admitted for Sepsis due to UTI, found to have E. Coli on urine Cx,  blood Cx NTD.  Pt. followed by ID Dr. Pulido, on Ceftriaxone D2/7, reports great improvement in symptomatology.  Pt. was also found to have urinary retention on admission, required alfonso placement, TOV will be attempted in am.  Pt. noted with A-Fib/Flutter on tele in am, rate up to 140bpm, asymptomatic.  Card Dr. oSn consulted, started Eliquis, Lopressor and Digoxin 0.5mg x 1 dose.  Pt. is currently back in SR 85bpm.

## 2020-11-30 DIAGNOSIS — F43.22 ADJUSTMENT DISORDER WITH ANXIETY: ICD-10-CM

## 2020-11-30 LAB
ANION GAP SERPL CALC-SCNC: 7 MMOL/L — SIGNIFICANT CHANGE UP (ref 5–17)
APPEARANCE UR: CLEAR — SIGNIFICANT CHANGE UP
BACTERIA # UR AUTO: ABNORMAL /HPF
BILIRUB UR-MCNC: NEGATIVE — SIGNIFICANT CHANGE UP
BUN SERPL-MCNC: 31 MG/DL — HIGH (ref 7–18)
CALCIUM SERPL-MCNC: 9.2 MG/DL — SIGNIFICANT CHANGE UP (ref 8.4–10.5)
CHLORIDE SERPL-SCNC: 104 MMOL/L — SIGNIFICANT CHANGE UP (ref 96–108)
CO2 SERPL-SCNC: 28 MMOL/L — SIGNIFICANT CHANGE UP (ref 22–31)
COLOR SPEC: YELLOW — SIGNIFICANT CHANGE UP
CREAT SERPL-MCNC: 0.94 MG/DL — SIGNIFICANT CHANGE UP (ref 0.5–1.3)
DIFF PNL FLD: ABNORMAL
DIGOXIN SERPL-MCNC: 2.7 NG/ML — CRITICAL HIGH (ref 0.8–2)
EPI CELLS # UR: SIGNIFICANT CHANGE UP /HPF
GLUCOSE SERPL-MCNC: 116 MG/DL — HIGH (ref 70–99)
GLUCOSE UR QL: NEGATIVE — SIGNIFICANT CHANGE UP
HCT VFR BLD CALC: 39.8 % — SIGNIFICANT CHANGE UP (ref 34.5–45)
HGB BLD-MCNC: 13 G/DL — SIGNIFICANT CHANGE UP (ref 11.5–15.5)
KETONES UR-MCNC: NEGATIVE — SIGNIFICANT CHANGE UP
LEUKOCYTE ESTERASE UR-ACNC: ABNORMAL
MCHC RBC-ENTMCNC: 31.3 PG — SIGNIFICANT CHANGE UP (ref 27–34)
MCHC RBC-ENTMCNC: 32.7 GM/DL — SIGNIFICANT CHANGE UP (ref 32–36)
MCV RBC AUTO: 95.7 FL — SIGNIFICANT CHANGE UP (ref 80–100)
NITRITE UR-MCNC: NEGATIVE — SIGNIFICANT CHANGE UP
NRBC # BLD: 0 /100 WBCS — SIGNIFICANT CHANGE UP (ref 0–0)
PH UR: 6 — SIGNIFICANT CHANGE UP (ref 5–8)
PLATELET # BLD AUTO: 367 K/UL — SIGNIFICANT CHANGE UP (ref 150–400)
POTASSIUM SERPL-MCNC: 4.3 MMOL/L — SIGNIFICANT CHANGE UP (ref 3.5–5.3)
POTASSIUM SERPL-SCNC: 4.3 MMOL/L — SIGNIFICANT CHANGE UP (ref 3.5–5.3)
PROT UR-MCNC: NEGATIVE — SIGNIFICANT CHANGE UP
RBC # BLD: 4.16 M/UL — SIGNIFICANT CHANGE UP (ref 3.8–5.2)
RBC # FLD: 13.3 % — SIGNIFICANT CHANGE UP (ref 10.3–14.5)
RBC CASTS # UR COMP ASSIST: ABNORMAL /HPF (ref 0–2)
SODIUM SERPL-SCNC: 139 MMOL/L — SIGNIFICANT CHANGE UP (ref 135–145)
SP GR SPEC: 1.02 — SIGNIFICANT CHANGE UP (ref 1.01–1.02)
UROBILINOGEN FLD QL: NEGATIVE — SIGNIFICANT CHANGE UP
WBC # BLD: 11.06 K/UL — HIGH (ref 3.8–10.5)
WBC # FLD AUTO: 11.06 K/UL — HIGH (ref 3.8–10.5)
WBC UR QL: ABNORMAL /HPF (ref 0–5)

## 2020-11-30 RX ADMIN — APIXABAN 2.5 MILLIGRAM(S): 2.5 TABLET, FILM COATED ORAL at 17:19

## 2020-11-30 RX ADMIN — Medication 25 MILLIGRAM(S): at 17:19

## 2020-11-30 RX ADMIN — APIXABAN 2.5 MILLIGRAM(S): 2.5 TABLET, FILM COATED ORAL at 06:04

## 2020-11-30 RX ADMIN — NYSTATIN CREAM 1 APPLICATION(S): 100000 CREAM TOPICAL at 17:20

## 2020-11-30 RX ADMIN — NYSTATIN CREAM 1 APPLICATION(S): 100000 CREAM TOPICAL at 06:07

## 2020-11-30 RX ADMIN — Medication 25 MILLIGRAM(S): at 06:04

## 2020-11-30 RX ADMIN — Medication 0.12 MILLIGRAM(S): at 06:04

## 2020-11-30 RX ADMIN — PANTOPRAZOLE SODIUM 40 MILLIGRAM(S): 20 TABLET, DELAYED RELEASE ORAL at 06:05

## 2020-11-30 NOTE — BEHAVIORAL HEALTH ASSESSMENT NOTE - OTHER
uses walker for ambulation refused to participate in serial 7's or spell world backward medical issues

## 2020-11-30 NOTE — PROGRESS NOTE ADULT - SUBJECTIVE AND OBJECTIVE BOX
CHIEF COMPLAINT:Patient is a 85y old  Female who presents with a chief complaint of abdominal pain and urinary symptoms.Pt appears comfortable.    	  REVIEW OF SYSTEMS:  CONSTITUTIONAL: No fever, weight loss, or fatigue  EYES: No eye pain, visual disturbances, or discharge  ENT:  No difficulty hearing, tinnitus, vertigo; No sinus or throat pain  NECK: No pain or stiffness  RESPIRATORY: No cough, wheezing, chills or hemoptysis; No Shortness of Breath  CARDIOVASCULAR: No chest pain, palpitations, passing out, dizziness, or leg swelling  GASTROINTESTINAL: No abdominal or epigastric pain. No nausea, vomiting, or hematemesis; No diarrhea or constipation. No melena or hematochezia.  GENITOURINARY: No dysuria, frequency, hematuria, or incontinence  NEUROLOGICAL: No headaches, memory loss, loss of strength, numbness, or tremors  SKIN: No itching, burning, rashes, or lesions   LYMPH Nodes: No enlarged glands  ENDOCRINE: No heat or cold intolerance; No hair loss  MUSCULOSKELETAL: No joint pain or swelling; No muscle, back, or extremity pain  PSYCHIATRIC: No depression, anxiety, mood swings, or difficulty sleeping  HEME/LYMPH: No easy bruising, or bleeding gums  ALLERGY AND IMMUNOLOGIC: No hives or eczema	      PHYSICAL EXAM:  T(C): 36.9 (11-30-20 @ 05:34), Max: 36.9 (11-30-20 @ 05:34)  HR: 87 (11-30-20 @ 05:34) (82 - 87)  BP: 139/57 (11-30-20 @ 05:34) (114/67 - 139/57)  RR: 18 (11-30-20 @ 05:34) (18 - 19)  SpO2: 98% (11-30-20 @ 05:34) (95% - 98%)        Appearance: Normal	  HEENT:   Normal oral mucosa, PERRL, EOMI	  Lymphatic: No lymphadenopathy  Cardiovascular: Normal S1 S2, No JVD, No murmurs, No edema  Respiratory: Lungs clear to auscultation	  Psychiatry: A & O x 3, Mood & affect appropriate  Gastrointestinal:  Soft, Non-tender, + BS	  Skin: No rashes, No ecchymoses, No cyanosis	  Neurologic: Non-focal  Extremities: Normal range of motion, No clubbing, cyanosis or edema  Vascular: Peripheral pulses palpable 2+ bilaterally    MEDICATIONS  (STANDING):  apixaban 2.5 milliGRAM(s) Oral two times a day  digoxin     Tablet 0.125 milliGRAM(s) Oral daily  metoprolol tartrate 25 milliGRAM(s) Oral two times a day  nystatin Ointment 1 Application(s) Topical two times a day  pantoprazole    Tablet 40 milliGRAM(s) Oral before breakfast  sodium chloride 0.9%. 1000 milliLiter(s) (80 mL/Hr) IV Continuous <Continuous>  	  	  LABS:	 	                       13.0   11.06 )-----------( 367      ( 30 Nov 2020 07:44 )             39.8     11-30    139  |  104  |  31<H>  ----------------------------<  116<H>  4.3   |  28  |  0.94    Ca    9.2      30 Nov 2020 07:44      Lipid Profile: Cholesterol 115  LDL --  HDL 55  TG 71      TSH: Thyroid Stimulating Hormone, Serum: 1.90 uU/mL (11-23 @ 06:02)

## 2020-11-30 NOTE — PROGRESS NOTE ADULT - ASSESSMENT
85 yr F from home with PMH of HTN, spinal stenosis, scoliosis presents to ED with complaints of  lower abdominal pain and burning with urination since 4 days. Pt reports increased urge to urinate and burning with urination beginning four days ago. Pt also complains dull lower abdominal pain when she urinates, 6/10 in severity and non-radiating. 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 Pt denies blood in urine, diarrhea, vomiting, chest pain, difficulty breathing, fevers, or recent sick contacts.    Pt with positive UA admitted for Sepsis due to UTI, found to have E. Coli on urine Cx,  blood Cx NTD.  Pt. followed by ID Dr. Pulido, on Ceftriaxone D3/7, reports great improvement in symptomatology.  Pt. was also found to have urinary retention on admission, required alfonso placement, alfonso removed in am, voiding with no difficulty.  Pt. noted with A-Fib/Flutter on tele on  rate up to 140bpm, asymptomatic.  Currently NSR on Digoxin, Lopressor and Eliquis, Card Dr. Son.  No further episodes of AF on tele.  ECHO normal with EF 55-60%.  Pt. is now agreeable to discharge to [facility] per PT recommendation.  CM trying to get her to [place], likely Friday.    -Pt. changed her mind about [facility] stating her   there and she doesnt want to be the next to die.  Pt. with unsafe home environment, has a do not discharge order per APS due to extreme hoarding situation.  Pt. awaiting psych eval to determine capacity as she does not allow access to her home for APS clean up, d/w attending.  Pt. is clinically stable for discharge.

## 2020-11-30 NOTE — PROGRESS NOTE ADULT - SUBJECTIVE AND OBJECTIVE BOX
NP Note discussed with  Primary Attending    Patient is a 85y old  Female who presents with a chief complaint of abdominal pain and urinary symptoms (30 Nov 2020 09:39)      INTERVAL HPI/OVERNIGHT EVENTS: no new complaints    MEDICATIONS  (STANDING):  apixaban 2.5 milliGRAM(s) Oral two times a day  digoxin     Tablet 0.125 milliGRAM(s) Oral daily  metoprolol tartrate 25 milliGRAM(s) Oral two times a day  nystatin Ointment 1 Application(s) Topical two times a day  pantoprazole    Tablet 40 milliGRAM(s) Oral before breakfast  sodium chloride 0.9%. 1000 milliLiter(s) (80 mL/Hr) IV Continuous <Continuous>    MEDICATIONS  (PRN):  acetaminophen   Tablet .. 650 milliGRAM(s) Oral every 6 hours PRN Temp greater or equal to 38C (100.4F)  acetaminophen   Tablet .. 650 milliGRAM(s) Oral every 6 hours PRN Mild Pain (1 - 3), Moderate Pain (4 - 6)      __________________________________________________  REVIEW OF SYSTEMS:    CONSTITUTIONAL: No fever,   EYES: no acute visual disturbances  NECK: No pain or stiffness  RESPIRATORY: No cough; No shortness of breath  CARDIOVASCULAR: No chest pain, no palpitations  GASTROINTESTINAL: No pain. No nausea or vomiting; No diarrhea   NEUROLOGICAL: No headache or numbness, no tremors  MUSCULOSKELETAL: No joint pain, no muscle pain  GENITOURINARY: no dysuria, no frequency, no hesitancy  PSYCHIATRY: no depression , no anxiety  ALL OTHER  ROS negative        Vital Signs Last 24 Hrs  T(C): 36.8 (30 Nov 2020 13:38), Max: 36.9 (30 Nov 2020 05:34)  T(F): 98.3 (30 Nov 2020 13:38), Max: 98.5 (30 Nov 2020 05:34)  HR: 87 (30 Nov 2020 13:38) (82 - 87)  BP: 117/86 (30 Nov 2020 13:38) (114/67 - 139/57)  BP(mean): --  RR: 18 (30 Nov 2020 13:38) (18 - 19)  SpO2: 95% (30 Nov 2020 13:38) (95% - 98%)    ________________________________________________  PHYSICAL EXAM:  anxious, well gromed  GENERAL: NAD  HEENT: Normocephalic;  conjunctivae and sclerae clear; moist mucous membranes;   NECK : supple  CHEST/LUNG: Clear to auscultation bilaterally with good air entry   HEART: S1 S2  regular; no murmurs, gallops or rubs  ABDOMEN: Soft, Nontender, Nondistended; Bowel sounds present  EXTREMITIES: no cyanosis; no edema; no calf tenderness  SKIN: warm and dry; no rash  NERVOUS SYSTEM:  Awake and alert; Oriented  to place, person and time ; no new deficits    _________________________________________________  LABS:                        13.0   11.06 )-----------( 367      ( 30 Nov 2020 07:44 )             39.8     11-30    139  |  104  |  31<H>  ----------------------------<  116<H>  4.3   |  28  |  0.94    Ca    9.2      30 Nov 2020 07:44          CAPILLARY BLOOD GLUCOSE      RADIOLOGY & ADDITIONAL TESTS:    Xray Chest 1 View- PORTABLE-Urgent (Xray Chest 1 View- PORTABLE-Urgent .) (11.22.20 @ 18:06) >  EXAM:  XR CHEST PORTABLE URGENT 1V                            PROCEDURE DATE:  11/22/2020          INTERPRETATION:  Chest one view    HISTORY: Sepsis    COMPARISON STUDY: None available    Frontal expiratory view of the chest shows the heart to be enlarged in size. The lungs show small left base infiltrate and there is no evidence of pneumothorax nor pleural effusion.    IMPRESSION:  Small left base infiltrate.    < end of copied text >        Imaging Personally Reviewed:  YES  Consultant(s) Notes Reviewed:   YES  Care Discussed with Consultants :     Plan of care was discussed with patient and /or primary care giver; all questions and concerns were addressed and care was aligned with patient's wishes.

## 2020-11-30 NOTE — PROGRESS NOTE ADULT - SUBJECTIVE AND OBJECTIVE BOX
Patient is seen and examined at the bed side, remains afebrile. The discharge planning noted and patient likes to be discharge home.        REVIEW OF SYSTEMS: All other review systems are negative      ALLERGIES: No Known Allergies      Vital Signs Last 24 Hrs  T(C): 36.8 (2020 13:38), Max: 36.9 (2020 05:34)  T(F): 98.3 (2020 13:38), Max: 98.5 (2020 05:34)  HR: 87 (:38) (83 - 87)  BP: 117/86 (2020 13:38) (114/67 - 139/57)  BP(mean): --  RR: 18 (:38) (18 - 18)  SpO2: 95% (:38) (95% - 98%)      PHYSICAL EXAM:  GENERAL: Not in distress   CHEST/LUNG: Not using accessory muscles   HEART: s1 and s2 present  ABDOMEN: Lower abdominal tenderness resolved  EXTREMITIES: No pedal  edema  CNS: Awake and Alert        LABS:                                   13.0   11.06 )-----------( 367      ( 2020 07:44 )             39.8                         13.3   11.41 )-----------( 361      ( 2020 07:35 )             41.0           139  |  104  |  31<H>  ----------------------------<  116<H>  4.3   |  28  |  0.94    Ca    9.2      2020 07:44            139  |  102  |  36<H>  ----------------------------<  86  4.9   |  27  |  1.06    Ca    9.0      2020 07:35    TPro  5.8<L>  /  Alb  2.1<L>  /  TBili  0.6  /  DBili  x   /  AST  40  /  ALT  27  /  AlkPhos  70  11-23      Urinalysis Basic - ( 2020 16:09 )  Color: Yellow / Appearance: Slightly Turbid / S.015 / pH: x  Gluc: x / Ketone: Negative  / Bili: Negative / Urobili: Negative   Blood: x / Protein: 100 / Nitrite: Positive   Leuk Esterase: Moderate / RBC: 5-10 /HPF / WBC >50 /HPF   Sq Epi: x / Non Sq Epi: Few /HPF / Bacteria: Moderate /HPF        MEDICATIONS  (STANDING):    apixaban 2.5 milliGRAM(s) Oral two times a day  digoxin     Tablet 0.125 milliGRAM(s) Oral daily  metoprolol tartrate 25 milliGRAM(s) Oral two times a day  nystatin Ointment 1 Application(s) Topical two times a day  pantoprazole    Tablet 40 milliGRAM(s) Oral before breakfast  sodium chloride 0.9%. 1000 milliLiter(s) (80 mL/Hr) IV Continuous <Continuous>        RADIOLOGY & ADDITIONAL TESTS:    20 : Xray Chest 1 View- PORTABLE-Urgent (Xray Chest 1 View- PORTABLE-Urgent .) (20 @ 18:06) Small left base infiltrate.        MICROBIOLOGY DATA:    Culture - Urine (20 @ 02:01)   - Amikacin: S <=16   - Amoxicillin/Clavulanic Acid: S <=8/4   - Ampicillin: S <=8 These ampicillin results predict results for amoxicillin   - Ampicillin/Sulbactam: S <=4/2 Enterobacter, Citrobacter, and Serratia may develop resistance during prolonged therapy (3-4 days)   - Aztreonam: S <=4   - Cefazolin: S <=2 (MIC_CL_COM_ENTERIC_CEFAZU) For uncomplicated UTI with K. pneumoniae, E. coli, or P. mirablis: YULY <=16 is sensitive and YULY >=32 is resistant. This also predicts results for oral agents cefaclor, cefdinir, cefpodoxime, cefprozil, cefuroxime axetil, cephalexin and locarbef for uncomplicated UTI. Note that some isolates may be susceptible to these agents while testing resistant to cefazolin.   - Cefepime: S <=2   - Cefoxitin: S <=8   - Ceftriaxone: S <=1 Enterobacter, Citrobacter, and Serratia may develop resistance during prolonged therapy   - Ciprofloxacin: S <=0.25   - Ertapenem: S <=0.5   - Gentamicin: S <=2   - Imipenem: S <=1   - Levofloxacin: S <=0.5   - Meropenem: S <=1   - Nitrofurantoin: S <=32 Should not be used to treat pyelonephritis   - Piperacillin/Tazobactam: S <=8   - Tigecycline: S <=2   - Tobramycin: S <=2   - Trimethoprim/Sulfamethoxazole: S <=0.5/9.5   Specimen Source: .Urine Clean Catch (Midstream)   Culture Results:   >100,000 CFU/ml Escherichia coli   Culture - Urine (20 @ 02:01)   Specimen Source: .Urine Clean Catch (Midstream)   Culture Results: >100,000 CFU/ml Escherichia coli     Culture - Blood (20 @ 22:16)   Specimen Source: .Blood Blood   Culture Results:  No growth to date.     Culture - Blood (20 @ 22:16)   Specimen Source: .Blood Blood   Culture Results: No growth to date.     Respiratory Viral Panel with COVID-19 by VIDHI (20 @ 16:46)   Rapid RVP Result: Chelse[place]avin   SARS-CoV-2: NotDetec:

## 2020-11-30 NOTE — PROGRESS NOTE ADULT - ASSESSMENT
Patient is a 85y old  Female from home with PMH of HTN, spinal stenosis, scoliosis presents to ER for evaluation of  lower abdominal pain and dysuria and increased  urinary frequency. On admission, she found to have fever, tachycardia, Leukocytosis and positive Urine analysis. The CXR shows Small left base infiltrate. She has started on Ceftriaxone and the Id consult requested to assist with further evaluation and antibiotic management.     # Sepsis ( Fever + tachycardia+ Leukocytosis)- The Blood cultures have no growth to date   # UTI - urine culture grew E,.coli.  # Possible pneumonia- on CXR  # Urinary retention - > 700 cc on bladder scan- 11/23- s/p removal of alfonso catheter, 11/25    would recommend:    1. PT/OOB to chair   2. Monitor OFF ABx, s/p completed the course  3. Follow up Psych recommendation     d/w patient and Nursing staff    Attending Attestation:    Spent more than 35 minutes on total encounter, more than 50 % of the visit was spent counseling and/or coordinating care by the Attending physician.

## 2020-11-30 NOTE — BEHAVIORAL HEALTH ASSESSMENT NOTE - NSBHCONSULTMEDAGITATION_PSY_A_CORE FT
For mild or moderate agitation, can give haldol 0.5mg or seroquel 25mg po q8H PRN  For severe agitation/po not possible, can give haldol 0.5-1mg with ATivan 1mg IM/IV  hold antipsychotics for QTc >500

## 2020-11-30 NOTE — BEHAVIORAL HEALTH ASSESSMENT NOTE - SUMMARY
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 Coli on urine Cx,  blood Cx NTD.  Pt. followed by ID Dr. Pulido, on Ceftriaxone with improvement in symptomatology. Per primary team, [facility] was recommended to patient by PT due to generalized weakness and will need physical rehab, but patient refused.  In addition, APS is involved as patient is hoarding at home, c/b neighbor and home is not a livable environment for patient per APS.  Psych is consulted for patient's capacity to refuse [facility] placement.  Though patient is aaox3, she is able to name her medical condition and reason why she was admitted, however, she is unable to appreciate severity of medical condition, unable to verbalize risk of refusing [facility] placement as she is unable to care for self due to medical condition as well as home not being a viable option to her as per primary team due to APS recommendation.  At this time, patient does not have capacity to participate in dispo planning, would defer to next of kin/HCP for dispo planning.  Does not need psychiatric inpatient treatment

## 2020-11-30 NOTE — PROGRESS NOTE ADULT - PROBLEM SELECTOR PLAN 7
Pt. lives alone.  Her  passed away due to COVID last May, she has no children or other relatives,  Pt.'s neighbor calls frequently to inform us that pt. will need assistance/placement as her home situation is unsafe due to hoarding.  PT recommended [facility]  Pt. adamantly refusing [facility].  Can not discharge to home as she as a do not discharge order from APS  SW/CM following  Awaiting psych eval

## 2020-11-30 NOTE — BEHAVIORAL HEALTH ASSESSMENT NOTE - HPI (INCLUDE ILLNESS QUALITY, SEVERITY, DURATION, TIMING, CONTEXT, MODIFYING FACTORS, ASSOCIATED SIGNS AND SYMPTOMS)
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 Coli on urine Cx,  blood Cx NTD.  Pt. followed by ID Dr. Pulido, on Ceftriaxone with improvement in symptomatology. Per primary team, [facility] was recommended to patient by PT due to generalized weakness and will need physical rehab, but patient refused.  In addition, APS is involved as patient is hoarding at home, c/b neighbor and home is not a livable environment for patient per APS.  Psych is consulted for patient's capacity to refuse [facility] placement.  Patient is anxious but aaox3 on evaluation, she is minimizing social situation, states she has no h/o psychiatric illness, reports no overt depression, anxiety, sleep disturbance, denied SA/SI/violence, currently denied SI/HI/AVH. She states her home is "a mess" but I really want to go back to it.  "please don't sent me to rehab" She states her  was infected with covid in May at [facility] and  the same month, and that is why she does not want to go to rehab.  She is unable to voice risk/possible negative consenquence of refusing rehab, keeps saying , she does not want to go.  She is unable to appreciate her medical condition and lack of ability to care for self.      Patient provided collateral contact of her friend's phone numbers:  [phone], [phone], [phone], none of above is working number

## 2020-11-30 NOTE — PROGRESS NOTE ADULT - ASSESSMENT
85 yr F from home with PMH of HTN, spinal stenosis, scoliosis presents to ED with complaints of  lower abdominal pain and burning with urination since 4 days,UTI,PAF with RVR.  1.[facility] now pt refusing to go.SW f/u.  2.UTI-ABX completed.  3.PAF-eliquis,lopressor,dig .125mg qd.  4.PPI.

## 2020-11-30 NOTE — BEHAVIORAL HEALTH ASSESSMENT NOTE - NSBHADMITCOUNSEL_PSY_A_CORE
instructions for management, treatment and follow up/risks and benefits of treatment options/diagnostic results/impressions and/or recommended studies

## 2020-12-01 DIAGNOSIS — F41.9 ANXIETY DISORDER, UNSPECIFIED: ICD-10-CM

## 2020-12-01 DIAGNOSIS — F43.20 ADJUSTMENT DISORDER, UNSPECIFIED: ICD-10-CM

## 2020-12-01 LAB
ANION GAP SERPL CALC-SCNC: 6 MMOL/L — SIGNIFICANT CHANGE UP (ref 5–17)
BUN SERPL-MCNC: 35 MG/DL — HIGH (ref 7–18)
CALCIUM SERPL-MCNC: 9.1 MG/DL — SIGNIFICANT CHANGE UP (ref 8.4–10.5)
CHLORIDE SERPL-SCNC: 105 MMOL/L — SIGNIFICANT CHANGE UP (ref 96–108)
CO2 SERPL-SCNC: 27 MMOL/L — SIGNIFICANT CHANGE UP (ref 22–31)
CREAT SERPL-MCNC: 0.94 MG/DL — SIGNIFICANT CHANGE UP (ref 0.5–1.3)
DIGOXIN SERPL-MCNC: 1 NG/ML — SIGNIFICANT CHANGE UP (ref 0.8–2)
GLUCOSE SERPL-MCNC: 83 MG/DL — SIGNIFICANT CHANGE UP (ref 70–99)
HCT VFR BLD CALC: 38.7 % — SIGNIFICANT CHANGE UP (ref 34.5–45)
HGB BLD-MCNC: 12.3 G/DL — SIGNIFICANT CHANGE UP (ref 11.5–15.5)
MCHC RBC-ENTMCNC: 30.4 PG — SIGNIFICANT CHANGE UP (ref 27–34)
MCHC RBC-ENTMCNC: 31.8 GM/DL — LOW (ref 32–36)
MCV RBC AUTO: 95.6 FL — SIGNIFICANT CHANGE UP (ref 80–100)
NRBC # BLD: 0 /100 WBCS — SIGNIFICANT CHANGE UP (ref 0–0)
PLATELET # BLD AUTO: 351 K/UL — SIGNIFICANT CHANGE UP (ref 150–400)
POTASSIUM SERPL-MCNC: 4.3 MMOL/L — SIGNIFICANT CHANGE UP (ref 3.5–5.3)
POTASSIUM SERPL-SCNC: 4.3 MMOL/L — SIGNIFICANT CHANGE UP (ref 3.5–5.3)
RBC # BLD: 4.05 M/UL — SIGNIFICANT CHANGE UP (ref 3.8–5.2)
RBC # FLD: 13.3 % — SIGNIFICANT CHANGE UP (ref 10.3–14.5)
SODIUM SERPL-SCNC: 138 MMOL/L — SIGNIFICANT CHANGE UP (ref 135–145)
WBC # BLD: 10.44 K/UL — SIGNIFICANT CHANGE UP (ref 3.8–10.5)
WBC # FLD AUTO: 10.44 K/UL — SIGNIFICANT CHANGE UP (ref 3.8–10.5)

## 2020-12-01 RX ORDER — HYDROXYZINE HCL 10 MG
25 TABLET ORAL EVERY 8 HOURS
Refills: 0 | Status: DISCONTINUED | OUTPATIENT
Start: 2020-12-01 | End: 2020-12-11

## 2020-12-01 RX ADMIN — APIXABAN 2.5 MILLIGRAM(S): 2.5 TABLET, FILM COATED ORAL at 18:16

## 2020-12-01 RX ADMIN — NYSTATIN CREAM 1 APPLICATION(S): 100000 CREAM TOPICAL at 17:01

## 2020-12-01 RX ADMIN — Medication 25 MILLIGRAM(S): at 18:16

## 2020-12-01 RX ADMIN — NYSTATIN CREAM 1 APPLICATION(S): 100000 CREAM TOPICAL at 05:28

## 2020-12-01 RX ADMIN — Medication 650 MILLIGRAM(S): at 00:56

## 2020-12-01 RX ADMIN — PANTOPRAZOLE SODIUM 40 MILLIGRAM(S): 20 TABLET, DELAYED RELEASE ORAL at 06:03

## 2020-12-01 RX ADMIN — Medication 0.12 MILLIGRAM(S): at 05:53

## 2020-12-01 RX ADMIN — APIXABAN 2.5 MILLIGRAM(S): 2.5 TABLET, FILM COATED ORAL at 05:28

## 2020-12-01 RX ADMIN — Medication 25 MILLIGRAM(S): at 05:28

## 2020-12-01 RX ADMIN — Medication 650 MILLIGRAM(S): at 01:36

## 2020-12-01 NOTE — PROGRESS NOTE ADULT - ASSESSMENT
85 yr F from home with PMH of HTN, spinal stenosis, scoliosis presents to ED with complaints of  lower abdominal pain and burning with urination since 4 days. Pt reports increased urge to urinate and burning with urination beginning four days ago. Pt also complains dull lower abdominal pain when she urinates, 6/10 in severity and non-radiating. 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 Pt denies blood in urine, diarrhea, vomiting, chest pain, difficulty breathing, fevers, or recent sick contacts.    Pt with positive UA admitted for Sepsis due to UTI, found to have E. Coli on urine Cx,  blood Cx NTD.  Pt. followed by ID Dr. Pulido, on Ceftriaxone D3/7, reports great improvement in symptomatology.  Pt. was also found to have urinary retention on admission, required alfonso placement, alfonso removed in am, voiding with no difficulty.  Pt. noted with A-Fib/Flutter on tele on  rate up to 140bpm, asymptomatic.  Currently NSR on Digoxin, Lopressor and Eliquis, Card Dr. Son.  No further episodes of AF on tele.  ECHO normal with EF 55-60%.  Pt. is now agreeable to discharge to [facility] per PT recommendation.  CM trying to get her to [place], likely Friday.    -Pt. changed her mind about [facility] stating her   there and she doesnt want to be the next to die.  Pt. with unsafe home environment, has a do not discharge order per APS due to extreme hoarding situation.  Pt. awaiting psych eval to determine capacity as she does not allow access to her home for APS clean up, d/w attending.  Pt. is clinically stable for discharge.  -Pt. remains medically stable for discharge.  Psych evaluation appreciated, pt. with no capacity to participate in dispo planning, SW following.

## 2020-12-01 NOTE — PROGRESS NOTE ADULT - PROBLEM SELECTOR PLAN 9
Pt. lives alone.  Her  passed away due to COVID last May, she has no children or other relatives,  Pt.'s neighbor calls frequently to inform us that pt. will need assistance/placement as her home situation is unsafe due to hoarding.  PT recommended [facility]  Pt. adamantly refusing [facility].  Can not discharge to home as she has a do not discharge order from APS  SW/CM following

## 2020-12-01 NOTE — PROGRESS NOTE ADULT - ASSESSMENT
Patient is a 85y old  Female from home with PMH of HTN, spinal stenosis, scoliosis presents to ER for evaluation of  lower abdominal pain and dysuria and increased  urinary frequency. On admission, she found to have fever, tachycardia, Leukocytosis and positive Urine analysis. The CXR shows Small left base infiltrate. She has started on Ceftriaxone and the Id consult requested to assist with further evaluation and antibiotic management.     # Sepsis ( Fever + tachycardia+ Leukocytosis)- The Blood cultures have no growth to date   # UTI - urine culture grew E,.coli.  # Possible pneumonia- on CXR  # Urinary retention - > 700 cc on bladder scan- 11/23- s/p removal of alfonso catheter, 11/25    would recommend:    1. Monitor OFF ABx,   2. PT/OOB to chair   3. Follow up Psych recommendation     d/w patient and Covering NPRosa    Attending Attestation:    Spent more than 35 minutes on total encounter, more than 50 % of the visit was spent counseling and/or coordinating care by the Attending physician.

## 2020-12-01 NOTE — PROGRESS NOTE ADULT - ASSESSMENT
85 yr F from home with PMH of HTN, spinal stenosis, scoliosis presents to ED with complaints of  lower abdominal pain and burning with urination since 4 days. Pt reports increased urge to urinate and burning with urination beginning four days ago. Pt also complains dull lower abdominal pain when she urinates, 6/10 in severity and non-radiating. 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 Pt denies blood in urine, diarrhea, vomiting, chest pain, difficulty breathing, fevers, or recent sick contacts.    Pt with positive UA admitted for Sepsis due to UTI, found to have E. Coli on urine Cx,  blood Cx NTD.  Pt. followed by ID Dr. Pulido, on Ceftriaxone D3/7, reports great improvement in symptomatology.  Pt. was also found to have urinary retention on admission, required alfonso placement, alfonso removed in am, voiding with no difficulty.  Pt. noted with A-Fib/Flutter on tele on  rate up to 140bpm, asymptomatic.  Currently NSR on Digoxin, Lopressor and Eliquis, Card Dr. Son.  No further episodes of AF on tele.  ECHO normal with EF 55-60%.  Pt. is now agreeable to discharge to [facility] [place] per PT recommendation.  CM trying to get her to [place], likely Friday.    -Pt. changed her mind about [facility] stating her   there and she doesnt want to be the next to die.  Pt. with unsafe home environment, has a do not discharge order per APS due to extreme hoarding situation.  Pt. awaiting psych eval to determine capacity as she does not allow access to her home for APS clean up, d/w attending.  Pt. is clinically stable for discharge.

## 2020-12-01 NOTE — PROGRESS NOTE ADULT - ATTENDING COMMENTS
Psych consult noted and much appreciated. Pt deemed without capacity. However pt's long standing close friend (Linda Enciso @[phone]), which pt herself is willing to appoint as her HCP, has contacted me and SW and is aware of pt's home setting confounded with the hoarding situation, making it unsafe for pt's habitation. Pt at present has no next of kin aside from estranged nieces / nephews in [place]. Pt herself prefers her friend Linda to be her healthcare proxy.     SW is aware of this, it is hoped that the home setting situation can be remedied through her HCP to APS satisfcation so that pt can be d/c to the home setting under the HCP's gaurdianship / supervision, in concurrence to pt's own wishes.. In the mean time she may consider rehab until Psych consult noted and much appreciated. Pt deemed without capacity. However pt's long standing close friend (Linda Enciso @[phone]), which pt herself is willing to appoint as her HCP, has contacted me and SW and is aware of pt's home setting confounded with the hoarding situation, making it unsafe for pt's habitation. Pt at present has no next of kin aside from estranged nieces / nephews in [place]. Pt herself prefers her friend Linda to be her healthcare proxy.     SW is aware of this, it is hoped that the home setting situation can be remedied through her HCP to APS satisfaction so that pt can be d/c to the home setting under the HCP's guardianship / supervision, in concurrence to pt's own wishes.. In the mean time she may consider rehab until Psych consult noted and much appreciated. Pt deemed without capacity. Pt has been AAOx3 and very polite and cooperative with good insight into her problem of hoarding. However pt's long standing close friend (Linda Enciso @[phone]), which pt herself is willing to appoint as her HCP, has contacted me and SW and is aware of pt's home setting confounded with the hoarding situation, making it unsafe for pt's habitation. Pt at present has no next of kin aside from estranged nieces / nephews in [place]. Pt herself prefers her friend Linda to be her healthcare proxy.     SW is aware of this, it is hoped that the home setting situation can be remedied through her HCP to APS satisfaction so that pt can be d/c to the home setting under the HCP's guardianship / supervision, in concurrence to pt's own wishes.     I had a conference call today by pt's bedside with her HCP on speaker, she is now working with the [employer] to have the apartment cleaned of all the overwhelming paper, books and journals.

## 2020-12-01 NOTE — PROGRESS NOTE ADULT - PROBLEM SELECTOR PLAN 7
Serum Albumin 2.6->2.1, likely due to poor nutrition  -Nutrition note appreciated  -Enlive 1can BID added to diet

## 2020-12-01 NOTE — PROGRESS NOTE ADULT - SUBJECTIVE AND OBJECTIVE BOX
CHIEF COMPLAINT:Patient is a 85y old  Female who presents with a chief complaint of abdominal pain and urinary symptoms.Pt appears comfortable.    	  REVIEW OF SYSTEMS:  CONSTITUTIONAL: No fever, weight loss, or fatigue  EYES: No eye pain, visual disturbances, or discharge  ENT:  No difficulty hearing, tinnitus, vertigo; No sinus or throat pain  NECK: No pain or stiffness  RESPIRATORY: No cough, wheezing, chills or hemoptysis; No Shortness of Breath  CARDIOVASCULAR: No chest pain, palpitations, passing out, dizziness, or leg swelling  GASTROINTESTINAL: No abdominal or epigastric pain. No nausea, vomiting, or hematemesis; No diarrhea or constipation. No melena or hematochezia.  GENITOURINARY: No dysuria, frequency, hematuria, or incontinence  NEUROLOGICAL: No headaches, memory loss, loss of strength, numbness, or tremors  SKIN: No itching, burning, rashes, or lesions   LYMPH Nodes: No enlarged glands  ENDOCRINE: No heat or cold intolerance; No hair loss  MUSCULOSKELETAL: No joint pain or swelling; No muscle, back, or extremity pain  PSYCHIATRIC: No depression, anxiety, mood swings, or difficulty sleeping  HEME/LYMPH: No easy bruising, or bleeding gums  ALLERGY AND IMMUNOLOGIC: No hives or eczema	    PHYSICAL EXAM:  T(C): 36.5 (12-01-20 @ 05:26), Max: 36.8 (11-30-20 @ 13:38)  HR: 85 (12-01-20 @ 05:26) (84 - 101)  BP: 116/54 (12-01-20 @ 05:26) (103/66 - 135/74)  RR: 18 (12-01-20 @ 05:26) (18 - 18)  SpO2: 96% (12-01-20 @ 05:26) (95% - 96%)    I&O's Summary    30 Nov 2020 07:01  -  01 Dec 2020 07:00  --------------------------------------------------------  IN: 0 mL / OUT: 750 mL / NET: -750 mL        Appearance: Normal	  HEENT:   Normal oral mucosa, PERRL, EOMI	  Lymphatic: No lymphadenopathy  Cardiovascular: Normal S1 S2, No JVD, No murmurs, No edema  Respiratory: Lungs clear to auscultation	  Psychiatry: A & O x 3, Mood & affect appropriate  Gastrointestinal:  Soft, Non-tender, + BS	  Skin: No rashes, No ecchymoses, No cyanosis	  Neurologic: Non-focal  Extremities: Normal range of motion, No clubbing, cyanosis or edema  Vascular: Peripheral pulses palpable 2+ bilaterally    MEDICATIONS  (STANDING):  apixaban 2.5 milliGRAM(s) Oral two times a day  digoxin     Tablet 0.125 milliGRAM(s) Oral daily  metoprolol tartrate 25 milliGRAM(s) Oral two times a day  nystatin Ointment 1 Application(s) Topical two times a day  pantoprazole    Tablet 40 milliGRAM(s) Oral before breakfast  sodium chloride 0.9%. 1000 milliLiter(s) (80 mL/Hr) IV Continuous <Continuous>     	  	  LABS:	 	                       12.3   10.44 )-----------( 351      ( 01 Dec 2020 07:01 )             38.7     12-01    138  |  105  |  35<H>  ----------------------------<  83  4.3   |  27  |  0.94    Ca    9.1      01 Dec 2020 07:01        Lipid Profile: Cholesterol 115  HDL 55  TG 71      TSH: Thyroid Stimulating Hormone, Serum: 1.90 uU/mL (11-23 @ 06:02)

## 2020-12-01 NOTE — PROGRESS NOTE ADULT - SUBJECTIVE AND OBJECTIVE BOX
NP Note discussed with  Primary Attending    Patient is a 85y old  Female who presents with a chief complaint of abdominal pain and urinary symptoms (01 Dec 2020 11:10)      INTERVAL HPI/OVERNIGHT EVENTS: no new complaints    MEDICATIONS  (STANDING):  apixaban 2.5 milliGRAM(s) Oral two times a day  digoxin     Tablet 0.125 milliGRAM(s) Oral daily  metoprolol tartrate 25 milliGRAM(s) Oral two times a day  nystatin Ointment 1 Application(s) Topical two times a day  pantoprazole    Tablet 40 milliGRAM(s) Oral before breakfast  sodium chloride 0.9%. 1000 milliLiter(s) (80 mL/Hr) IV Continuous <Continuous>    MEDICATIONS  (PRN):  acetaminophen   Tablet .. 650 milliGRAM(s) Oral every 6 hours PRN Temp greater or equal to 38C (100.4F)  acetaminophen   Tablet .. 650 milliGRAM(s) Oral every 6 hours PRN Mild Pain (1 - 3), Moderate Pain (4 - 6)  hydrOXYzine hydrochloride 25 milliGRAM(s) Oral every 8 hours PRN sleep/anxiety      __________________________________________________  REVIEW OF SYSTEMS:    CONSTITUTIONAL: No fever,   EYES: no acute visual disturbances  NECK: No pain or stiffness  RESPIRATORY: No cough; No shortness of breath  CARDIOVASCULAR: No chest pain, no palpitations  GASTROINTESTINAL: No pain. No nausea or vomiting; No diarrhea   NEUROLOGICAL: No headache or numbness, no tremors  MUSCULOSKELETAL: No joint pain, no muscle pain  GENITOURINARY: no dysuria, no frequency, no hesitancy  PSYCHIATRY: no depression , no anxiety  ALL OTHER  ROS negative        Vital Signs Last 24 Hrs  T(C): 36.5 (01 Dec 2020 14:27), Max: 36.5 (01 Dec 2020 05:26)  T(F): 97.7 (01 Dec 2020 14:27), Max: 97.7 (01 Dec 2020 05:26)  HR: 83 (01 Dec 2020 14:27) (83 - 101)  BP: 122/57 (01 Dec 2020 14:27) (103/66 - 135/74)  BP(mean): --  RR: 18 (01 Dec 2020 14:27) (18 - 18)  SpO2: 95% (01 Dec 2020 14:27) (95% - 96%)    ________________________________________________  PHYSICAL EXAM:  GENERAL: NAD  HEENT: Normocephalic;  conjunctivae and sclerae clear; moist mucous membranes;   NECK : supple  CHEST/LUNG: Clear to auscultation bilaterally with good air entry   HEART: S1 S2  regular; no murmurs, gallops or rubs  ABDOMEN: Soft, Nontender, Nondistended; Bowel sounds present  EXTREMITIES: no cyanosis; no edema; no calf tenderness  SKIN: warm and dry; no rash  NERVOUS SYSTEM:  Awake and alert; Oriented  to place, person and time ; no new deficits    _________________________________________________  LABS:                        12.3   10.44 )-----------( 351      ( 01 Dec 2020 07:01 )             38.7     12-    138  |  105  |  35<H>  ----------------------------<  83  4.3   |  27  |  0.94    Ca    9.1      01 Dec 2020 07:01        Urinalysis Basic - ( 2020 14:00 )    Color: Yellow / Appearance: Clear / S.020 / pH: x  Gluc: x / Ketone: Negative  / Bili: Negative / Urobili: Negative   Blood: x / Protein: Negative / Nitrite: Negative   Leuk Esterase: Trace / RBC: 5-10 /HPF / WBC 6-10 /HPF   Sq Epi: x / Non Sq Epi: Few /HPF / Bacteria: Few /HPF      CAPILLARY BLOOD GLUCOSE            RADIOLOGY & ADDITIONAL TESTS:    Imaging Personally Reviewed:  YES/NO    Consultant(s) Notes Reviewed:   YES/ No    Care Discussed with Consultants :     Plan of care was discussed with patient and /or primary care giver; all questions and concerns were addressed and care was aligned with patient's wishes.

## 2020-12-01 NOTE — PROGRESS NOTE ADULT - SUBJECTIVE AND OBJECTIVE BOX
Patient is seen and examined at the bed side, remains afebrile. She mentioned feeling better.      REVIEW OF SYSTEMS: All other review systems are negative      ALLERGIES: No Known Allergies      Vital Signs Last 24 Hrs  T(C): 36.5 (01 Dec 2020 05:26), Max: 36.8 (2020 13:38)  T(F): 97.7 (01 Dec 2020 05:26), Max: 98.3 (2020 13:38)  HR: 85 (01 Dec 2020 05:) (84 - 101)  BP: 116/54 (01 Dec 2020 05:) (103/66 - 135/74)  BP(mean): --  RR: 18 (01 Dec 2020 05:) (18 - 18)  SpO2: 96% (01 Dec 2020 05:) (95% - 96%)      PHYSICAL EXAM:  GENERAL: Not in distress   CHEST/LUNG: Not using accessory muscles   HEART: s1 and s2 present  ABDOMEN: Lower abdominal tenderness resolved  EXTREMITIES: No pedal  edema  CNS: Awake and Alert        LABS:                                   12.3   10.44 )-----------( 351      ( 01 Dec 2020 07:01 )             38.7                         13.0   11.06 )-----------( 367      ( 2020 07:44 )             39.8       12-    138  |  105  |  35<H>  ----------------------------<  83  4.3   |  27  |  0.94    Ca    9.1      01 Dec 2020 07:01        11-30    139  |  104  |  31<H>  ----------------------------<  116<H>  4.3   |  28  |  0.94    Ca    9.2      2020 07:44    TPro  5.8<L>  /  Alb  2.1<L>  /  TBili  0.6  /  DBili  x   /  AST  40  /  ALT  27  /  AlkPhos  70  11-23      Urinalysis Basic - ( 2020 16:09 )  Color: Yellow / Appearance: Slightly Turbid / S.015 / pH: x  Gluc: x / Ketone: Negative  / Bili: Negative / Urobili: Negative   Blood: x / Protein: 100 / Nitrite: Positive   Leuk Esterase: Moderate / RBC: 5-10 /HPF / WBC >50 /HPF   Sq Epi: x / Non Sq Epi: Few /HPF / Bacteria: Moderate /HPF        MEDICATIONS  (STANDING):    apixaban 2.5 milliGRAM(s) Oral two times a day  digoxin     Tablet 0.125 milliGRAM(s) Oral daily  metoprolol tartrate 25 milliGRAM(s) Oral two times a day  nystatin Ointment 1 Application(s) Topical two times a day  pantoprazole    Tablet 40 milliGRAM(s) Oral before breakfast  sodium chloride 0.9%. 1000 milliLiter(s) (80 mL/Hr) IV Continuous <Continuous>        RADIOLOGY & ADDITIONAL TESTS:    20 : Xray Chest 1 View- PORTABLE-Urgent (Xray Chest 1 View- PORTABLE-Urgent .) (20 @ 18:06) Small left base infiltrate.        MICROBIOLOGY DATA:    Culture - Urine (20 @ 02:01)   - Amikacin: S <=16   - Amoxicillin/Clavulanic Acid: S <=8/4   - Ampicillin: S <=8 These ampicillin results predict results for amoxicillin   - Ampicillin/Sulbactam: S <=4/2 Enterobacter, Citrobacter, and Serratia may develop resistance during prolonged therapy (3-4 days)   - Aztreonam: S <=4   - Cefazolin: S <=2 (MIC_CL_COM_ENTERIC_CEFAZU) For uncomplicated UTI with K. pneumoniae, E. coli, or P. mirablis: YULY <=16 is sensitive and YULY >=32 is resistant. This also predicts results for oral agents cefaclor, cefdinir, cefpodoxime, cefprozil, cefuroxime axetil, cephalexin and locarbef for uncomplicated UTI. Note that some isolates may be susceptible to these agents while testing resistant to cefazolin.   - Cefepime: S <=2   - Cefoxitin: S <=8   - Ceftriaxone: S <=1 Enterobacter, Citrobacter, and Serratia may develop resistance during prolonged therapy   - Ciprofloxacin: S <=0.25   - Ertapenem: S <=0.5   - Gentamicin: S <=2   - Imipenem: S <=1   - Levofloxacin: S <=0.5   - Meropenem: S <=1   - Nitrofurantoin: S <=32 Should not be used to treat pyelonephritis   - Piperacillin/Tazobactam: S <=8   - Tigecycline: S <=2   - Tobramycin: S <=2   - Trimethoprim/Sulfamethoxazole: S <=0.5/9.5   Specimen Source: .Urine Clean Catch (Midstream)   Culture Results:   >100,000 CFU/ml Escherichia coli   Culture - Urine (20 @ 02:01)   Specimen Source: .Urine Clean Catch (Midstream)   Culture Results: >100,000 CFU/ml Escherichia coli     Culture - Blood (20 @ 22:16)   Specimen Source: .Blood Blood   Culture Results:  No growth to date.     Culture - Blood (20 @ 22:16)   Specimen Source: .Blood Blood   Culture Results: No growth to date.     Respiratory Viral Panel with COVID-19 by [facility] (20 @ 16:46)   Rapid RVP Result: Chelse[place]avin   SARS-CoV-2: NotDetec:

## 2020-12-01 NOTE — PROGRESS NOTE ADULT - SUBJECTIVE AND OBJECTIVE BOX
JOSE GARDNER  MR# [id number]  85yFemale        Patient is a 85y old  Female who presents with a chief complaint of abdominal pain and urinary symptoms (01 Dec 2020 11:10)      INTERVAL HPI/OVERNIGHT EVENTS:  Patient seen and examined at bedside. No notations of chest pain, palpitation, SOB, orthopnea, nausea, vomiting or abdominal pain.    ALLERGIES  No Known Allergies      MEDICATIONS  acetaminophen   Tablet .. 650 milliGRAM(s) Oral every 6 hours PRN Temp greater or equal to 38C (100.4F)  acetaminophen   Tablet .. 650 milliGRAM(s) Oral every 6 hours PRN Mild Pain (1 - 3), Moderate Pain (4 - 6)  apixaban 2.5 milliGRAM(s) Oral two times a day  digoxin     Tablet 0.125 milliGRAM(s) Oral daily  hydrOXYzine hydrochloride 25 milliGRAM(s) Oral every 8 hours PRN sleep/anxiety  metoprolol tartrate 25 milliGRAM(s) Oral two times a day  nystatin Ointment 1 Application(s) Topical two times a day  pantoprazole    Tablet 40 milliGRAM(s) Oral before breakfast  sodium chloride 0.9%. 1000 milliLiter(s) IV Continuous <Continuous>              REVIEW OF SYSTEMS:  CONSTITUTIONAL: No fever, weight loss, or fatigue  EYES: No eye pain, visual disturbances, or discharge  ENT:  No difficulty hearing, tinnitus, vertigo; No sinus or throat pain  NECK: No pain or stiffness  RESPIRATORY: No cough, wheezing, chills or hemoptysis; No Shortness of Breath  CARDIOVASCULAR: No chest pain, palpitations, passing out, dizziness, or leg swelling  GASTROINTESTINAL: No abdominal or epigastric pain. No nausea, vomiting, or hematemesis; No diarrhea or constipation. No melena or hematochezia.  GENITOURINARY: No dysuria, frequency, hematuria, or incontinence  NEUROLOGICAL: No headaches, memory loss, loss of strength, numbness, or tremors  SKIN: No itching, burning, rashes, or lesions   LYMPH Nodes: No enlarged glands  ENDOCRINE: No heat or cold intolerance; No hair loss  MUSCULOSKELETAL: No joint pain or swelling; No muscle, back, or extremity pain  PSYCHIATRIC: No depression, anxiety, mood swings, or difficulty sleeping  HEME/LYMPH: No easy bruising, or bleeding gums  ALLERGY AND IMMUNOLOGIC: No hives or eczema	    [ ] All others negative	  [ ] Unable to obtain      T(C): 36.5 (20 @ 14:27), Max: 36.5 (20 @ 05:26)  T(F): 97.7 (20 @ 14:27), Max: 97.7 (20 @ 05:26)  HR: 83 (20 @ 14:27) (83 - 101)  BP: 122/57 (20 @ 14:27) (103/66 - 135/74)  RR: 18 (20 @ 14:27) (18 - 18)  SpO2: 95% (20 @ 14:27) (95% - 96%)  Wt(kg): --    I&O's Summary    2020 07:01  -  01 Dec 2020 07:00  --------------------------------------------------------  IN: 0 mL / OUT: 750 mL / NET: -750 mL          PHYSICAL EXAM:  A X O x  HEAD:  Atraumatic, Normocephalic  EYES: EOMI, PERRLA, conjunctiva and sclera clear  NECK: Supple, No JVD, Normal thyroid  Resp: CTAB, No crackles, wheezing,   CVS: Regular rate and rhythm; No discernable murmurs, rubs, or gallops  ABD: Soft, Nontender, Nondistended; Bowel sounds present  EXTREMITIES:  2+ Peripheral Pulses, No edema  LYMPH: No dicernable lymphadenopathy noted  GENERAL: NAD, well-groomed, well-developed      LABS:                        12.3   10.44 )-----------( 351      ( 01 Dec 2020 07:01 )             38.7         138  |  105  |  35<H>  ----------------------------<  83  4.3   |  27  |  0.94    Ca    9.1      01 Dec 2020 07:01        Urinalysis Basic - ( 2020 14:00 )    Color: Yellow / Appearance: Clear / S.020 / pH: x  Gluc: x / Ketone: Negative  / Bili: Negative / Urobili: Negative   Blood: x / Protein: Negative / Nitrite: Negative   Leuk Esterase: Trace / RBC: 5-10 /HPF / WBC 6-10 /HPF   Sq Epi: x / Non Sq Epi: Few /HPF / Bacteria: Few /HPF      CAPILLARY BLOOD GLUCOSE          Troponins:  ProBNP:  Lipid Profile:   HgA1c:  TSH:           RADIOLOGY & ADDITIONAL TESTS:    Imaging Personally Reviewed:  [ ] YES  [ ] NO      Consultant(s) Notes Reviewed:  [x ] YES  [ ] NO    Care Discussed with Consultants/Other Providers [ x] YES  [ ] NO          PAST MEDICAL & SURGICAL HISTORY:  Spinal stenosis, unspecified spinal region    Scoliosis, unspecified scoliosis type, unspecified spinal region    Cataract of both eyes, unspecified cataract type          Sepsis    No pertinent family history in first degree relatives    Handoff    MEWS Score    Spinal stenosis, unspecified spinal region    Scoliosis, unspecified scoliosis type, unspecified spinal region    Sepsis without acute organ dysfunction, due to unspecified organism    Adjustment disorder with anxiety    Discharge planning issues    Hypoalbuminemia due to protein-calorie malnutrition    Urinary retention    Paroxysmal atrial fibrillation with RVR    HTN (hypertension)    Need for prophylactic measure    Sepsis secondary to UTI    Cataract of both eyes, unspecified cataract type    A ABDOMINAL PAIN    Urinary tract infection without hematuria, site unspecified    [email or web address]

## 2020-12-02 LAB
ANION GAP SERPL CALC-SCNC: 6 MMOL/L — SIGNIFICANT CHANGE UP (ref 5–17)
BUN SERPL-MCNC: 30 MG/DL — HIGH (ref 7–18)
CALCIUM SERPL-MCNC: 9.1 MG/DL — SIGNIFICANT CHANGE UP (ref 8.4–10.5)
CHLORIDE SERPL-SCNC: 104 MMOL/L — SIGNIFICANT CHANGE UP (ref 96–108)
CO2 SERPL-SCNC: 28 MMOL/L — SIGNIFICANT CHANGE UP (ref 22–31)
CREAT SERPL-MCNC: 1.01 MG/DL — SIGNIFICANT CHANGE UP (ref 0.5–1.3)
GLUCOSE SERPL-MCNC: 86 MG/DL — SIGNIFICANT CHANGE UP (ref 70–99)
HCT VFR BLD CALC: 37.7 % — SIGNIFICANT CHANGE UP (ref 34.5–45)
HGB BLD-MCNC: 12.1 G/DL — SIGNIFICANT CHANGE UP (ref 11.5–15.5)
MCHC RBC-ENTMCNC: 30.9 PG — SIGNIFICANT CHANGE UP (ref 27–34)
MCHC RBC-ENTMCNC: 32.1 GM/DL — SIGNIFICANT CHANGE UP (ref 32–36)
MCV RBC AUTO: 96.4 FL — SIGNIFICANT CHANGE UP (ref 80–100)
NRBC # BLD: 0 /100 WBCS — SIGNIFICANT CHANGE UP (ref 0–0)
PLATELET # BLD AUTO: 363 K/UL — SIGNIFICANT CHANGE UP (ref 150–400)
POTASSIUM SERPL-MCNC: 4.4 MMOL/L — SIGNIFICANT CHANGE UP (ref 3.5–5.3)
POTASSIUM SERPL-SCNC: 4.4 MMOL/L — SIGNIFICANT CHANGE UP (ref 3.5–5.3)
RBC # BLD: 3.91 M/UL — SIGNIFICANT CHANGE UP (ref 3.8–5.2)
RBC # FLD: 13.7 % — SIGNIFICANT CHANGE UP (ref 10.3–14.5)
SODIUM SERPL-SCNC: 138 MMOL/L — SIGNIFICANT CHANGE UP (ref 135–145)
WBC # BLD: 9.9 K/UL — SIGNIFICANT CHANGE UP (ref 3.8–10.5)
WBC # FLD AUTO: 9.9 K/UL — SIGNIFICANT CHANGE UP (ref 3.8–10.5)

## 2020-12-02 RX ORDER — CEFTRIAXONE 500 MG/1
1000 INJECTION, POWDER, FOR SOLUTION INTRAMUSCULAR; INTRAVENOUS EVERY 24 HOURS
Refills: 0 | Status: DISCONTINUED | OUTPATIENT
Start: 2020-12-02 | End: 2020-12-02

## 2020-12-02 RX ORDER — CIPROFLOXACIN LACTATE 400MG/40ML
500 VIAL (ML) INTRAVENOUS EVERY 12 HOURS
Refills: 0 | Status: DISCONTINUED | OUTPATIENT
Start: 2020-12-02 | End: 2020-12-07

## 2020-12-02 RX ADMIN — Medication 650 MILLIGRAM(S): at 01:30

## 2020-12-02 RX ADMIN — Medication 650 MILLIGRAM(S): at 23:17

## 2020-12-02 RX ADMIN — NYSTATIN CREAM 1 APPLICATION(S): 100000 CREAM TOPICAL at 18:55

## 2020-12-02 RX ADMIN — APIXABAN 2.5 MILLIGRAM(S): 2.5 TABLET, FILM COATED ORAL at 06:14

## 2020-12-02 RX ADMIN — Medication 25 MILLIGRAM(S): at 18:54

## 2020-12-02 RX ADMIN — APIXABAN 2.5 MILLIGRAM(S): 2.5 TABLET, FILM COATED ORAL at 18:53

## 2020-12-02 RX ADMIN — Medication 25 MILLIGRAM(S): at 06:14

## 2020-12-02 RX ADMIN — Medication 500 MILLIGRAM(S): at 19:00

## 2020-12-02 RX ADMIN — NYSTATIN CREAM 1 APPLICATION(S): 100000 CREAM TOPICAL at 06:14

## 2020-12-02 RX ADMIN — Medication 0.12 MILLIGRAM(S): at 06:14

## 2020-12-02 RX ADMIN — Medication 650 MILLIGRAM(S): at 00:38

## 2020-12-02 RX ADMIN — PANTOPRAZOLE SODIUM 40 MILLIGRAM(S): 20 TABLET, DELAYED RELEASE ORAL at 06:15

## 2020-12-02 NOTE — PROGRESS NOTE ADULT - SUBJECTIVE AND OBJECTIVE BOX
Patient is seen and examined at the bed side, remains afebrile. She is c/o mild dysuria and repeat Urine analysis as of  is mildly positive,      REVIEW OF SYSTEMS: All other review systems are negative      ALLERGIES: No Known Allergies      Vital Signs Last 24 Hrs  T(C): 36.2 (02 Dec 2020 04:45), Max: 36.6 (01 Dec 2020 21:40)  T(F): 97.2 (02 Dec 2020 04:45), Max: 97.9 (01 Dec 2020 21:40)  HR: 70 (02 Dec 2020 04:45) (70 - 87)  BP: 122/69 (02 Dec 2020 04:45) (116/63 - 122/76)  BP(mean): --  RR: 17 (02 Dec 2020 04:45) (17 - 18)  SpO2: 96% (02 Dec 2020 04:45) (95% - 96%)      PHYSICAL EXAM:  GENERAL: Not in distress   CHEST/LUNG: Not using accessory muscles   HEART: s1 and s2 present  ABDOMEN: Lower abdominal tenderness resolved  EXTREMITIES: No pedal  edema  CNS: Awake and Alert        LABS:                        12.1   9.90  )-----------( 363      ( 02 Dec 2020 07:48 )             37.7                                    12.3   10.44 )-----------( 351      ( 01 Dec 2020 07:01 )             38.7                         13.0   11.06 )-----------( 367      ( 2020 07:44 )             39.8       12-    138  |  104  |  30<H>  ----------------------------<  86  4.4   |  28  |  1.01    Ca    9.1      02 Dec 2020 07:48      12-    138  |  105  |  35<H>  ----------------------------<  83  4.3   |  27  |  0.94    Ca    9.1      01 Dec 2020 07:01    TPro  5.8<L>  /  Alb  2.1<L>  /  TBili  0.6  /  DBili  x   /  AST  40  /  ALT  27  /  AlkPhos  70  11-23      Urinalysis Basic - ( 2020 16:09 )  Color: Yellow / Appearance: Slightly Turbid / S.015 / pH: x  Gluc: x / Ketone: Negative  / Bili: Negative / Urobili: Negative   Blood: x / Protein: 100 / Nitrite: Positive   Leuk Esterase: Moderate / RBC: 5-10 /HPF / WBC >50 /HPF   Sq Epi: x / Non Sq Epi: Few /HPF / Bacteria: Moderate /HPF        MEDICATIONS  (STANDING):    apixaban 2.5 milliGRAM(s) Oral two times a day  digoxin     Tablet 0.125 milliGRAM(s) Oral daily  metoprolol tartrate 25 milliGRAM(s) Oral two times a day  nystatin Ointment 1 Application(s) Topical two times a day  pantoprazole    Tablet 40 milliGRAM(s) Oral before breakfast  sodium chloride 0.9%. 1000 milliLiter(s) (80 mL/Hr) IV Continuous <Continuous>        RADIOLOGY & ADDITIONAL TESTS:    20 : Xray Chest 1 View- PORTABLE-Urgent (Xray Chest 1 View- PORTABLE-Urgent .) (20 @ 18:06) Small left base infiltrate.        MICROBIOLOGY DATA:    Urine Microscopic-Add On (NC) (20 @ 14:00)   Red Blood Cell - Urine: 5-10 /HPF   White Blood Cell - Urine: 6-10 /HPF   Bacteria: Few /HPF   Epithelial Cells: Few /HPF     Culture - Urine (20 @ 02:01)   - Amikacin: S <=16   - Amoxicillin/Clavulanic Acid: S <=8/4   - Ampicillin: S <=8 These ampicillin results predict results for amoxicillin   - Ampicillin/Sulbactam: S <=4/2 Enterobacter, Citrobacter, and Serratia may develop resistance during prolonged therapy (3-4 days)   - Aztreonam: S <=4   - Cefazolin: S <=2 (MIC_CL_COM_ENTERIC_CEFAZU) For uncomplicated UTI with K. pneumoniae, E. coli, or P. mirablis: YULY <=16 is sensitive and YULY >=32 is resistant. This also predicts results for oral agents cefaclor, cefdinir, cefpodoxime, cefprozil, cefuroxime axetil, cephalexin and locarbef for uncomplicated UTI. Note that some isolates may be susceptible to these agents while testing resistant to cefazolin.   - Cefepime: S <=2   - Cefoxitin: S <=8   - Ceftriaxone: S <=1 Enterobacter, Citrobacter, and Serratia may develop resistance during prolonged therapy   - Ciprofloxacin: S <=0.25   - Ertapenem: S <=0.5   - Gentamicin: S <=2   - Imipenem: S <=1   - Levofloxacin: S <=0.5   - Meropenem: S <=1   - Nitrofurantoin: S <=32 Should not be used to treat pyelonephritis   - Piperacillin/Tazobactam: S <=8   - Tigecycline: S <=2   - Tobramycin: S <=2   - Trimethoprim/Sulfamethoxazole: S <=0.5/9.5   Specimen Source: .Urine Clean Catch (Midstream)   Culture Results:   >100,000 CFU/ml Escherichia coli   Culture - Urine (20 @ 02:01)   Specimen Source: .Urine Clean Catch (Midstream)   Culture Results: >100,000 CFU/ml Escherichia coli     Culture - Blood (20 @ 22:16)   Specimen Source: .Blood Blood   Culture Results:  No growth to date.     Culture - Blood (20 @ 22:16)   Specimen Source: .Blood Blood   Culture Results: No growth to date.     Respiratory Viral Panel with COVID-19 by [facility] (20 @ 16:46)   Rapid RVP Result: Rachel   SARS-CoV-2: NotDetec:

## 2020-12-02 NOTE — PROGRESS NOTE ADULT - ASSESSMENT
Patient is a 85y old  Female from home with PMH of HTN, spinal stenosis, scoliosis presents to ER for evaluation of  lower abdominal pain and dysuria and increased  urinary frequency. On admission, she found to have fever, tachycardia, Leukocytosis and positive Urine analysis. The CXR shows Small left base infiltrate. She has started on Ceftriaxone and the Id consult requested to assist with further evaluation and antibiotic management.     # Sepsis ( Fever + tachycardia+ Leukocytosis)- The Blood cultures have no growth to date   # UTI - urine culture grew E,.coli.  # Possible pneumonia- on CXR  # Urinary retention - > 700 cc on bladder scan- 11/23- s/p removal of alfonso catheter, 11/25    would recommend:    1. Please  start on Ceftriaxone since  repeat UA is positive   2. PT/OOB to chair   3. Follow up Psych recommendation     d/w patient and Dr. Serrano     Attending Attestation:    Spent more than 35 minutes on total encounter, more than 50 % of the visit was spent counseling and/or coordinating care by the Attending physician.

## 2020-12-02 NOTE — PROGRESS NOTE ADULT - SUBJECTIVE AND OBJECTIVE BOX
NP Note:    Patient is a 85y old  Female who presents with a chief complaint of abdominal pain and urinary symptoms (02 Dec 2020 11:00)      INTERVAL HPI/OVERNIGHT EVENTS: no new complaints    MEDICATIONS  (STANDING):  apixaban 2.5 milliGRAM(s) Oral two times a day  cefTRIAXone   IVPB 1000 milliGRAM(s) IV Intermittent every 24 hours  digoxin     Tablet 0.125 milliGRAM(s) Oral daily  metoprolol tartrate 25 milliGRAM(s) Oral two times a day  nystatin Ointment 1 Application(s) Topical two times a day  pantoprazole    Tablet 40 milliGRAM(s) Oral before breakfast  sodium chloride 0.9%. 1000 milliLiter(s) (80 mL/Hr) IV Continuous <Continuous>    MEDICATIONS  (PRN):  acetaminophen   Tablet .. 650 milliGRAM(s) Oral every 6 hours PRN Temp greater or equal to 38C (100.4F)  acetaminophen   Tablet .. 650 milliGRAM(s) Oral every 6 hours PRN Mild Pain (1 - 3), Moderate Pain (4 - 6)  hydrOXYzine hydrochloride 25 milliGRAM(s) Oral every 8 hours PRN sleep/anxiety      __________________________________________________  REVIEW OF SYSTEMS:    CONSTITUTIONAL: No fever,   EYES: no acute visual disturbances  NECK: No pain or stiffness  RESPIRATORY: No cough; No shortness of breath  CARDIOVASCULAR: No chest pain, no palpitations  GASTROINTESTINAL: No pain. No nausea or vomiting; No diarrhea   NEUROLOGICAL: No headache or numbness, no tremors  MUSCULOSKELETAL: No joint pain, no muscle pain  GENITOURINARY: no dysuria, no frequency, no hesitancy  PSYCHIATRY: no depression , no anxiety  ALL OTHER  ROS negative        Vital Signs Last 24 Hrs  T(C): 36.2 (02 Dec 2020 04:45), Max: 36.6 (01 Dec 2020 21:40)  T(F): 97.2 (02 Dec 2020 04:45), Max: 97.9 (01 Dec 2020 21:40)  HR: 91 (02 Dec 2020 14:36) (70 - 91)  BP: 115/93 (02 Dec 2020 14:36) (115/93 - 122/76)  BP(mean): --  RR: 17 (02 Dec 2020 14:36) (17 - 18)  SpO2: 95% (02 Dec 2020 14:36) (95% - 96%)    ________________________________________________  PHYSICAL EXAM:  GENERAL: NAD  HEENT: Normocephalic;  conjunctivae and sclerae clear; moist mucous membranes;   NECK : supple  CHEST/LUNG: Clear to auscultation bilaterally with good air entry   HEART: S1 S2  regular; no murmurs, gallops or rubs  ABDOMEN: Soft, Nontender, Nondistended; Bowel sounds present  EXTREMITIES: no cyanosis; no edema; no calf tenderness  SKIN: warm and dry; no rash  NERVOUS SYSTEM:  Awake and alert; Oriented  to place, person and time ; no new deficits    _________________________________________________  LABS:                        12.1   9.90  )-----------( 363      ( 02 Dec 2020 07:48 )             37.7     12-02    138  |  104  |  30<H>  ----------------------------<  86  4.4   |  28  |  1.01    Ca    9.1      02 Dec 2020 07:48          CAPILLARY BLOOD GLUCOSE            RADIOLOGY & ADDITIONAL TESTS:    Imaging  Reviewed:  YES/NO    Consultant(s) Notes Reviewed:   YES/ No      Plan of care was discussed with patient and /or primary care giver; all questions and concerns were addressed

## 2020-12-02 NOTE — PROGRESS NOTE ADULT - PROBLEM SELECTOR PLAN 9
Pt. lives alone.  Her  passed away due to COVID last May, she has no children or other relatives,  Pt.'s neighbor calls frequently to inform us that pt. will need assistance/placement as her home situation is unsafe due to hoarding.  PT recommended ASNTOSH  Pt. adamantly refusing SANTOSH.  Can not discharge to home as she has a do not discharge order from [facility]  SW/CM following

## 2020-12-02 NOTE — PROGRESS NOTE ADULT - ATTENDING COMMENTS
Psych consult noted and much appreciated. Pt deemed without capacity regarding d/c planning given limited insight in the past into her hoarding problem - being a fire hazard. Pt has been AAOx3 and very polite and cooperative with good insight now into her problem of hoarding, agreeing "it absolutely needs to be taken care of". However pt's long standing close friend (Linda Enciso @[phone]), which pt herself is willing to appoint as her HCP, has contacted me and SW and is aware of pt's home setting (confounded with the hoarding situation), making it unsafe for pt's habitation. Pt at present has no next of kin aside from estranged nieces / nephews in [place]. Pt herself prefers her friend Linda to be her healthcare proxy.     SW is aware of this, it is hoped that the home setting situation can be remedied through her HCP (to [facility]'s satisfaction) so that pt can be d/c to the home setting under the HCP's guardianship / supervision, in concurrence to pt's own wishes.     I had a conference call today by pt's bedside with her HCP on speaker, she is now working with the [employer] to have the apartment cleaned of all the overwhelming paper, books and journals.

## 2020-12-02 NOTE — PROGRESS NOTE ADULT - SUBJECTIVE AND OBJECTIVE BOX
JOSE GARDNER  MR# [id number]  85yFemale        Patient is a 85y old  Female who presents with a chief complaint of abdominal pain and urinary symptoms (02 Dec 2020 11:20)      INTERVAL HPI/OVERNIGHT EVENTS:  Patient seen and examined at bedside. No notations of chest pain, palpitation, SOB, orthopnea, nausea, vomiting or abdominal pain.    ALLERGIES  No Known Allergies      MEDICATIONS  acetaminophen   Tablet .. 650 milliGRAM(s) Oral every 6 hours PRN Temp greater or equal to 38C (100.4F)  acetaminophen   Tablet .. 650 milliGRAM(s) Oral every 6 hours PRN Mild Pain (1 - 3), Moderate Pain (4 - 6)  apixaban 2.5 milliGRAM(s) Oral two times a day  cefTRIAXone   IVPB 1000 milliGRAM(s) IV Intermittent every 24 hours  digoxin     Tablet 0.125 milliGRAM(s) Oral daily  hydrOXYzine hydrochloride 25 milliGRAM(s) Oral every 8 hours PRN sleep/anxiety  metoprolol tartrate 25 milliGRAM(s) Oral two times a day  nystatin Ointment 1 Application(s) Topical two times a day  pantoprazole    Tablet 40 milliGRAM(s) Oral before breakfast  sodium chloride 0.9%. 1000 milliLiter(s) IV Continuous <Continuous>              REVIEW OF SYSTEMS:  CONSTITUTIONAL: No fever, weight loss, or fatigue  EYES: No eye pain, visual disturbances, or discharge  ENT:  No difficulty hearing, tinnitus, vertigo; No sinus or throat pain  NECK: No pain or stiffness  RESPIRATORY: No cough, wheezing, chills or hemoptysis; No Shortness of Breath  CARDIOVASCULAR: No chest pain, palpitations, passing out, dizziness, or leg swelling  GASTROINTESTINAL: No abdominal or epigastric pain. No nausea, vomiting, or hematemesis; No diarrhea or constipation. No melena or hematochezia.  GENITOURINARY: No dysuria, frequency, hematuria, or incontinence  NEUROLOGICAL: No headaches, memory loss, loss of strength, numbness, or tremors  SKIN: No itching, burning, rashes, or lesions   LYMPH Nodes: No enlarged glands  ENDOCRINE: No heat or cold intolerance; No hair loss  MUSCULOSKELETAL: No joint pain or swelling; No muscle, back, or extremity pain  PSYCHIATRIC: No depression, anxiety, mood swings, or difficulty sleeping  HEME/LYMPH: No easy bruising, or bleeding gums  ALLERGY AND IMMUNOLOGIC: No hives or eczema	    [ ] All others negative	  [ ] Unable to obtain      T(C): 36.2 (20 @ 04:45), Max: 36.6 (20 @ 21:40)  T(F): 97.2 (20 @ 04:45), Max: 97.9 (20 @ 21:40)  HR: 70 (20 @ 04:45) (70 - 87)  BP: 122/69 (20 @ 04:45) (116/63 - 122/76)  RR: 17 (20 @ 04:45) (17 - 18)  SpO2: 96% (20 @ 04:45) (95% - 96%)  Wt(kg): --    I&O's Summary        PHYSICAL EXAM:  A X O x  HEAD:  Atraumatic, Normocephalic  EYES: EOMI, PERRLA, conjunctiva and sclera clear  NECK: Supple, No JVD, Normal thyroid  Resp: CTAB, No crackles, wheezing,   CVS: Regular rate and rhythm; No discernable murmurs, rubs, or gallops  ABD: Soft, Nontender, Nondistended; Bowel sounds present  EXTREMITIES:  2+ Peripheral Pulses, No edema  LYMPH: No dicernable lymphadenopathy noted  GENERAL: NAD, well-groomed, well-developed      LABS:                        12.1   9.90  )-----------( 363      ( 02 Dec 2020 07:48 )             37.7     12-    138  |  104  |  30<H>  ----------------------------<  86  4.4   |  28  |  1.01    Ca    9.1      02 Dec 2020 07:48        Urinalysis Basic - ( 2020 14:00 )    Color: Yellow / Appearance: Clear / S.020 / pH: x  Gluc: x / Ketone: Negative  / Bili: Negative / Urobili: Negative   Blood: x / Protein: Negative / Nitrite: Negative   Leuk Esterase: Trace / RBC: 5-10 /HPF / WBC 6-10 /HPF   Sq Epi: x / Non Sq Epi: Few /HPF / Bacteria: Few /HPF      CAPILLARY BLOOD GLUCOSE          Troponins:  ProBNP:  Lipid Profile:   HgA1c:  TSH:           RADIOLOGY & ADDITIONAL TESTS:    Imaging Personally Reviewed:  [ ] YES  [ ] NO      Consultant(s) Notes Reviewed:  [x ] YES  [ ] NO    Care Discussed with Consultants/Other Providers [ x] YES  [ ] NO          PAST MEDICAL & SURGICAL HISTORY:  Spinal stenosis, unspecified spinal region    Scoliosis, unspecified scoliosis type, unspecified spinal region    Cataract of both eyes, unspecified cataract type          Sepsis    No pertinent family history in first degree relatives    Handoff    MEWS Score    Spinal stenosis, unspecified spinal region    Scoliosis, unspecified scoliosis type, unspecified spinal region    Sepsis without acute organ dysfunction, due to unspecified organism    Anxiety    Adjustment disorder    Adjustment disorder with anxiety    Discharge planning issues    Hypoalbuminemia due to protein-calorie malnutrition    Urinary retention    Paroxysmal atrial fibrillation with RVR    HTN (hypertension)    Need for prophylactic measure    Sepsis secondary to UTI    Cataract of both eyes, unspecified cataract type    A ABDOMINAL PAIN    Urinary tract infection without hematuria, site unspecified    SysAdmin_VisitLink

## 2020-12-02 NOTE — PROGRESS NOTE ADULT - ASSESSMENT
85 yr F from home with PMH of HTN, spinal stenosis, scoliosis presents to ED with complaints of  lower abdominal pain and burning with urination since 4 days. Pt reports increased urge to urinate and burning with urination beginning four days ago. Pt also complains dull lower abdominal pain when she urinates, 6/10 in severity and non-radiating. 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 Pt denies blood in urine, diarrhea, vomiting, chest pain, difficulty breathing, fevers, or recent sick contacts.    Pt with positive UA admitted for Sepsis due to UTI, found to have E. Coli on urine Cx,  blood Cx NTD.  Pt. followed by ID Dr. Pulido, on Ceftriaxone D3/7, reports great improvement in symptomatology.  Pt. was also found to have urinary retention on admission, required alfonso placement, alfonso removed in am, voiding with no difficulty.  Pt. noted with A-Fib/Flutter on tele on  rate up to 140bpm, asymptomatic.  Currently NSR on Digoxin, Lopressor and Eliquis, Card Dr. Son.  No further episodes of AF on tele.  ECHO normal with EF 55-60%.  Pt. is now agreeable to discharge to [facility] per PT recommendation.  CM trying to get her to [place], likely Friday.    -Pt. changed her mind about [facility] stating her   there and she doesnt want to be the next to die.  Pt. with unsafe home environment, has a do not discharge order per APS due to extreme hoarding situation.  Pt. awaiting psych eval to determine capacity as she does not allow access to her home for APS clean up, d/w attending.  Pt. is clinically stable for discharge.

## 2020-12-02 NOTE — PROGRESS NOTE ADULT - ASSESSMENT
85 yr F from home with PMH of HTN, spinal stenosis, scoliosis presents to ED with complaints of  lower abdominal pain and burning with urination since 4 days. Pt reports increased urge to urinate and burning with urination beginning four days ago. Pt also complains dull lower abdominal pain when she urinates, 6/10 in severity and non-radiating. 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 Pt denies blood in urine, diarrhea, vomiting, chest pain, difficulty breathing, fevers, or recent sick contacts.    Pt with positive UA admitted for Sepsis due to UTI, found to have E. Coli on urine Cx,  blood Cx NTD.  Pt. followed by ID Dr. Pulido, on Ceftriaxone D3/7, reports great improvement in symptomatology.  Pt. was also found to have urinary retention on admission, required alfonso placement, alfonso removed in am, voiding with no difficulty.  Pt. noted with A-Fib/Flutter on tele on  rate up to 140bpm, asymptomatic.  Currently NSR on Digoxin, Lopressor and Eliquis, Card Dr. Son.  No further episodes of AF on tele.  ECHO normal with EF 55-60%.  Pt. is now agreeable to discharge to [facility] per PT recommendation.  CM trying to get her to [place], likely Friday.    -Pt. changed her mind about [facility] stating her   there and she doesnt want to be the next to die.  Pt. with unsafe home environment, has a do not discharge order per APS due to extreme hoarding situation.  Pt. awaiting psych eval to determine capacity as she does not allow access to her home for APS clean up, d/w attending.  Pt. is clinically stable for discharge.  -Pt. remains medically stable for discharge.  Psych evaluation appreciated, pt. with no capacity to participate in dispo planning, SW following.    - Pt is stable pending d/c.  However, pt was started on Ceftriaxone, repeat UA + for UTI and will change to Oral Abx per Dr. Serrano since pt does not want to have peripheral IV.

## 2020-12-02 NOTE — PROGRESS NOTE ADULT - SUBJECTIVE AND OBJECTIVE BOX
CHIEF COMPLAINT:Patient is a 85y old  Female who presents with a chief complaint of abdominal pain and urinary symptoms.Pt appears comfortable.    	  REVIEW OF SYSTEMS:  CONSTITUTIONAL: No fever, weight loss, or fatigue  EYES: No eye pain, visual disturbances, or discharge  ENT:  No difficulty hearing, tinnitus, vertigo; No sinus or throat pain  NECK: No pain or stiffness  RESPIRATORY: No cough, wheezing, chills or hemoptysis; No Shortness of Breath  CARDIOVASCULAR: No chest pain, palpitations, passing out, dizziness, or leg swelling  GASTROINTESTINAL: No abdominal or epigastric pain. No nausea, vomiting, or hematemesis; No diarrhea or constipation. No melena or hematochezia.  GENITOURINARY: No dysuria, frequency, hematuria, or incontinence  NEUROLOGICAL: No headaches, memory loss, loss of strength, numbness, or tremors  SKIN: No itching, burning, rashes, or lesions   LYMPH Nodes: No enlarged glands  ENDOCRINE: No heat or cold intolerance; No hair loss  MUSCULOSKELETAL: No joint pain or swelling; No muscle, back, or extremity pain  PSYCHIATRIC: No depression, anxiety, mood swings, or difficulty sleeping  HEME/LYMPH: No easy bruising, or bleeding gums  ALLERGY AND IMMUNOLOGIC: No hives or eczema	      PHYSICAL EXAM:  T(C): 36.2 (12-02-20 @ 04:45), Max: 36.6 (12-01-20 @ 21:40)  HR: 70 (12-02-20 @ 04:45) (70 - 87)  BP: 122/69 (12-02-20 @ 04:45) (116/63 - 122/76)  RR: 17 (12-02-20 @ 04:45) (17 - 18)  SpO2: 96% (12-02-20 @ 04:45) (95% - 96%)        Appearance: Normal	  HEENT:   Normal oral mucosa, PERRL, EOMI	  Lymphatic: No lymphadenopathy  Cardiovascular: Normal S1 S2, No JVD, No murmurs, No edema  Respiratory: Lungs clear to auscultation	  Psychiatry: A & O x 3, Mood & affect appropriate  Gastrointestinal:  Soft, Non-tender, + BS	  Skin: No rashes, No ecchymoses, No cyanosis	  Neurologic: Non-focal  Extremities: Normal range of motion, No clubbing, cyanosis or edema  Vascular: Peripheral pulses palpable 2+ bilaterally    MEDICATIONS  (STANDING):  apixaban 2.5 milliGRAM(s) Oral two times a day  cefTRIAXone   IVPB 1000 milliGRAM(s) IV Intermittent every 24 hours  digoxin     Tablet 0.125 milliGRAM(s) Oral daily  metoprolol tartrate 25 milliGRAM(s) Oral two times a day  nystatin Ointment 1 Application(s) Topical two times a day  pantoprazole    Tablet 40 milliGRAM(s) Oral before breakfast  sodium chloride 0.9%. 1000 milliLiter(s) (80 mL/Hr) IV Continuous <Continuous>    	  	  LABS:	 	                      12.1   9.90  )-----------( 363      ( 02 Dec 2020 07:48 )             37.7     12-02    138  |  104  |  30<H>  ----------------------------<  86  4.4   |  28  |  1.01    Ca    9.1      02 Dec 2020 07:48        Lipid Profile: Cholesterol 115  HDL 55  TG 71    TSH: Thyroid Stimulating Hormone, Serum: 1.90 uU/mL (11-23 @ 06:02)      	    Digoxin Level, Serum: 1.0 ng/mL (12.01.20 @ 07:01)

## 2020-12-03 RX ORDER — NYSTATIN CREAM 100000 [USP'U]/G
1 CREAM TOPICAL
Refills: 0 | Status: DISCONTINUED | OUTPATIENT
Start: 2020-12-03 | End: 2020-12-11

## 2020-12-03 RX ORDER — APIXABAN 2.5 MG/1
2.5 TABLET, FILM COATED ORAL EVERY 12 HOURS
Refills: 0 | Status: DISCONTINUED | OUTPATIENT
Start: 2020-12-03 | End: 2020-12-11

## 2020-12-03 RX ADMIN — NYSTATIN CREAM 1 APPLICATION(S): 100000 CREAM TOPICAL at 05:08

## 2020-12-03 RX ADMIN — APIXABAN 2.5 MILLIGRAM(S): 2.5 TABLET, FILM COATED ORAL at 17:11

## 2020-12-03 RX ADMIN — PANTOPRAZOLE SODIUM 40 MILLIGRAM(S): 20 TABLET, DELAYED RELEASE ORAL at 06:29

## 2020-12-03 RX ADMIN — NYSTATIN CREAM 1 APPLICATION(S): 100000 CREAM TOPICAL at 17:11

## 2020-12-03 RX ADMIN — Medication 0.12 MILLIGRAM(S): at 05:07

## 2020-12-03 RX ADMIN — Medication 650 MILLIGRAM(S): at 00:10

## 2020-12-03 RX ADMIN — APIXABAN 2.5 MILLIGRAM(S): 2.5 TABLET, FILM COATED ORAL at 05:07

## 2020-12-03 RX ADMIN — Medication 500 MILLIGRAM(S): at 17:11

## 2020-12-03 RX ADMIN — Medication 500 MILLIGRAM(S): at 05:07

## 2020-12-03 RX ADMIN — Medication 25 MILLIGRAM(S): at 17:11

## 2020-12-03 RX ADMIN — Medication 650 MILLIGRAM(S): at 23:45

## 2020-12-03 RX ADMIN — Medication 25 MILLIGRAM(S): at 05:07

## 2020-12-03 NOTE — PROGRESS NOTE ADULT - SUBJECTIVE AND OBJECTIVE BOX
CHIEF COMPLAINT:Patient is a 85y old  Female who presents with a chief complaint of abdominal pain and urinary symptoms .Pt appears comfortable.    	  REVIEW OF SYSTEMS:  CONSTITUTIONAL: No fever, weight loss, or fatigue  EYES: No eye pain, visual disturbances, or discharge  ENT:  No difficulty hearing, tinnitus, vertigo; No sinus or throat pain  NECK: No pain or stiffness  RESPIRATORY: No cough, wheezing, chills or hemoptysis; No Shortness of Breath  CARDIOVASCULAR: No chest pain, palpitations, passing out, dizziness, or leg swelling  GASTROINTESTINAL: No abdominal or epigastric pain. No nausea, vomiting, or hematemesis; No diarrhea or constipation. No melena or hematochezia.  GENITOURINARY: No dysuria, frequency, hematuria, or incontinence  NEUROLOGICAL: No headaches, memory loss, loss of strength, numbness, or tremors  SKIN: No itching, burning, rashes, or lesions   LYMPH Nodes: No enlarged glands  ENDOCRINE: No heat or cold intolerance; No hair loss  MUSCULOSKELETAL: No joint pain or swelling; No muscle, back, or extremity pain  PSYCHIATRIC: No depression, anxiety, mood swings, or difficulty sleeping  HEME/LYMPH: No easy bruising, or bleeding gums  ALLERGY AND IMMUNOLOGIC: No hives or eczema	        PHYSICAL EXAM:  T(C): 36.8 (12-03-20 @ 04:55), Max: 36.8 (12-03-20 @ 04:55)  HR: 66 (12-03-20 @ 04:55) (66 - 91)  BP: 120/63 (12-03-20 @ 04:55) (115/93 - 120/63)  RR: 18 (12-03-20 @ 04:55) (17 - 18)  SpO2: 96% (12-03-20 @ 04:55) (95% - 96%)      Appearance: Normal	  HEENT:   Normal oral mucosa, PERRL, EOMI	  Lymphatic: No lymphadenopathy  Cardiovascular: Normal S1 S2, No JVD, No murmurs, No edema  Respiratory: Lungs clear to auscultation	  Psychiatry: A & O x 3, Mood & affect appropriate  Gastrointestinal:  Soft, Non-tender, + BS	  Skin: No rashes, No ecchymoses, No cyanosis	  Neurologic: Non-focal  Extremities: Normal range of motion, No clubbing, cyanosis or edema  Vascular: Peripheral pulses palpable 2+ bilaterally    MEDICATIONS  (STANDING):  apixaban 2.5 milliGRAM(s) Oral two times a day  ciprofloxacin     Tablet 500 milliGRAM(s) Oral every 12 hours  digoxin     Tablet 0.125 milliGRAM(s) Oral daily  metoprolol tartrate 25 milliGRAM(s) Oral two times a day  nystatin Ointment 1 Application(s) Topical two times a day  pantoprazole    Tablet 40 milliGRAM(s) Oral before breakfast  sodium chloride 0.9%. 1000 milliLiter(s) (80 mL/Hr) IV Continuous <Continuous>    	  	  LABS:	 	                        12.1   9.90  )-----------( 363      ( 02 Dec 2020 07:48 )             37.7     12-02    138  |  104  |  30<H>  ----------------------------<  86  4.4   |  28  |  1.01    Ca    9.1      02 Dec 2020 07:48      proBNP:   Lipid Profile: Cholesterol 115  LDL --  HDL 55  TG 71    HgA1c:   TSH: Thyroid Stimulating Hormone, Serum: 1.90 uU/mL (11-23 @ 06:02)

## 2020-12-03 NOTE — PROGRESS NOTE ADULT - SUBJECTIVE AND OBJECTIVE BOX
JOSE GARDNER  MR# [id number]  85yFemale        Patient is a 85y old  Female who presents with a chief complaint of abdominal pain and urinary symptoms (03 Dec 2020 11:32)      INTERVAL HPI/OVERNIGHT EVENTS:  Patient seen and examined at bedside. No notations of chest pain, palpitation, SOB, orthopnea, nausea, vomiting or abdominal pain.    ALLERGIES  No Known Allergies      MEDICATIONS  acetaminophen   Tablet .. 650 milliGRAM(s) Oral every 6 hours PRN Temp greater or equal to 38C (100.4F)  acetaminophen   Tablet .. 650 milliGRAM(s) Oral every 6 hours PRN Mild Pain (1 - 3), Moderate Pain (4 - 6)  apixaban 2.5 milliGRAM(s) Oral two times a day  ciprofloxacin     Tablet 500 milliGRAM(s) Oral every 12 hours  digoxin     Tablet 0.125 milliGRAM(s) Oral daily  hydrOXYzine hydrochloride 25 milliGRAM(s) Oral every 8 hours PRN sleep/anxiety  metoprolol tartrate 25 milliGRAM(s) Oral two times a day  nystatin Ointment 1 Application(s) Topical two times a day  pantoprazole    Tablet 40 milliGRAM(s) Oral before breakfast  sodium chloride 0.9%. 1000 milliLiter(s) IV Continuous <Continuous>              REVIEW OF SYSTEMS:  CONSTITUTIONAL: No fever, weight loss, or fatigue  EYES: No eye pain, visual disturbances, or discharge  ENT:  No difficulty hearing, tinnitus, vertigo; No sinus or throat pain  NECK: No pain or stiffness  RESPIRATORY: No cough, wheezing, chills or hemoptysis; No Shortness of Breath  CARDIOVASCULAR: No chest pain, palpitations, passing out, dizziness, or leg swelling  GASTROINTESTINAL: No abdominal or epigastric pain. No nausea, vomiting, or hematemesis; No diarrhea or constipation. No melena or hematochezia.  GENITOURINARY: No dysuria, frequency, hematuria, or incontinence  NEUROLOGICAL: No headaches, memory loss, loss of strength, numbness, or tremors  SKIN: No itching, burning, rashes, or lesions   LYMPH Nodes: No enlarged glands  ENDOCRINE: No heat or cold intolerance; No hair loss  MUSCULOSKELETAL: No joint pain or swelling; No muscle, back, or extremity pain  PSYCHIATRIC: No depression, anxiety, mood swings, or difficulty sleeping  HEME/LYMPH: No easy bruising, or bleeding gums  ALLERGY AND IMMUNOLOGIC: No hives or eczema	    [ ] All others negative	  [ ] Unable to obtain      T(C): 36.8 (12-03-20 @ 04:55), Max: 36.8 (12-03-20 @ 04:55)  T(F): 98.3 (12-03-20 @ 04:55), Max: 98.3 (12-03-20 @ 04:55)  HR: 66 (12-03-20 @ 04:55) (66 - 91)  BP: 120/63 (12-03-20 @ 04:55) (115/93 - 120/63)  RR: 18 (12-03-20 @ 04:55) (17 - 18)  SpO2: 96% (12-03-20 @ 04:55) (95% - 96%)  Wt(kg): --    I&O's Summary        PHYSICAL EXAM:  A X O x  HEAD:  Atraumatic, Normocephalic  EYES: EOMI, PERRLA, conjunctiva and sclera clear  NECK: Supple, No JVD, Normal thyroid  Resp: CTAB, No crackles, wheezing,   CVS: Regular rate and rhythm; No discernable murmurs, rubs, or gallops  ABD: Soft, Nontender, Nondistended; Bowel sounds present  EXTREMITIES:  2+ Peripheral Pulses, No edema  LYMPH: No dicernable lymphadenopathy noted  GENERAL: NAD, well-groomed, well-developed      LABS:                        12.1   9.90  )-----------( 363      ( 02 Dec 2020 07:48 )             37.7     12-02    138  |  104  |  30<H>  ----------------------------<  86  4.4   |  28  |  1.01    Ca    9.1      02 Dec 2020 07:48          CAPILLARY BLOOD GLUCOSE          Troponins:  ProBNP:  Lipid Profile:   HgA1c:  TSH:           RADIOLOGY & ADDITIONAL TESTS:    Imaging Personally Reviewed:  [ ] YES  [ ] NO      Consultant(s) Notes Reviewed:  [x ] YES  [ ] NO    Care Discussed with Consultants/Other Providers [ x] YES  [ ] NO          PAST MEDICAL & SURGICAL HISTORY:  Spinal stenosis, unspecified spinal region    Scoliosis, unspecified scoliosis type, unspecified spinal region    Cataract of both eyes, unspecified cataract type          Sepsis    No pertinent family history in first degree relatives    Handoff    MEWS Score    Spinal stenosis, unspecified spinal region    Scoliosis, unspecified scoliosis type, unspecified spinal region    Sepsis without acute organ dysfunction, due to unspecified organism    Anxiety    Adjustment disorder    Adjustment disorder with anxiety    Discharge planning issues    Hypoalbuminemia due to protein-calorie malnutrition    Urinary retention    Paroxysmal atrial fibrillation with RVR    HTN (hypertension)    Need for prophylactic measure    Sepsis secondary to UTI    Cataract of both eyes, unspecified cataract type    A ABDOMINAL PAIN    Urinary tract infection without hematuria, site unspecified    SysAdmin_VisitLink

## 2020-12-03 NOTE — PROGRESS NOTE ADULT - SUBJECTIVE AND OBJECTIVE BOX
NP Note discussed with  Primary Attending    Patient is a 85y old  Female who presents with a chief complaint of abdominal pain and urinary symptoms (03 Dec 2020 13:43)      INTERVAL HPI/OVERNIGHT EVENTS: no new complaints    MEDICATIONS  (STANDING):  apixaban 2.5 milliGRAM(s) Oral two times a day  ciprofloxacin     Tablet 500 milliGRAM(s) Oral every 12 hours  digoxin     Tablet 0.125 milliGRAM(s) Oral daily  metoprolol tartrate 25 milliGRAM(s) Oral two times a day  nystatin Ointment 1 Application(s) Topical two times a day  pantoprazole    Tablet 40 milliGRAM(s) Oral before breakfast  sodium chloride 0.9%. 1000 milliLiter(s) (80 mL/Hr) IV Continuous <Continuous>    MEDICATIONS  (PRN):  acetaminophen   Tablet .. 650 milliGRAM(s) Oral every 6 hours PRN Temp greater or equal to 38C (100.4F)  acetaminophen   Tablet .. 650 milliGRAM(s) Oral every 6 hours PRN Mild Pain (1 - 3), Moderate Pain (4 - 6)  hydrOXYzine hydrochloride 25 milliGRAM(s) Oral every 8 hours PRN sleep/anxiety      __________________________________________________  REVIEW OF SYSTEMS:    CONSTITUTIONAL: No fever,   EYES: no acute visual disturbances  NECK: No pain or stiffness  RESPIRATORY: No cough; No shortness of breath  CARDIOVASCULAR: No chest pain, no palpitations  GASTROINTESTINAL: No pain. No nausea or vomiting; No diarrhea   NEUROLOGICAL: No headache or numbness, no tremors  MUSCULOSKELETAL: No joint pain, no muscle pain  GENITOURINARY: no dysuria, no frequency, no hesitancy  PSYCHIATRY: no depression , no anxiety  ALL OTHER  ROS negative        Vital Signs Last 24 Hrs  T(C): 37.2 (03 Dec 2020 14:12), Max: 37.2 (03 Dec 2020 14:12)  T(F): 98.9 (03 Dec 2020 14:12), Max: 98.9 (03 Dec 2020 14:12)  HR: 85 (03 Dec 2020 14:12) (66 - 90)  BP: 112/51 (03 Dec 2020 14:12) (112/51 - 120/63)  BP(mean): --  RR: 17 (03 Dec 2020 14:12) (17 - 18)  SpO2: 94% (03 Dec 2020 14:12) (94% - 96%)    ________________________________________________  PHYSICAL EXAM:  GENERAL: NAD  HEENT: Normocephalic;  conjunctivae and sclerae clear; moist mucous membranes;   NECK : supple  CHEST/LUNG: Clear to auscultation bilaterally with good air entry   HEART: S1 S2  regular; no murmurs, gallops or rubs  ABDOMEN: Soft, Nontender, Nondistended; Bowel sounds present  EXTREMITIES: no cyanosis; no edema; no calf tenderness  SKIN: warm and dry; no rash  NERVOUS SYSTEM:  Awake and alert; Oriented  to place, person and time ; no new deficits    _________________________________________________  LABS:                        12.1   9.90  )-----------( 363      ( 02 Dec 2020 07:48 )             37.7     12-02    138  |  104  |  30<H>  ----------------------------<  86  4.4   |  28  |  1.01    Ca    9.1      02 Dec 2020 07:48          CAPILLARY BLOOD GLUCOSE    RADIOLOGY & ADDITIONAL TESTS:        Imaging Personally Reviewed:  YES/NO    Consultant(s) Notes Reviewed:   YES/ No    Care Discussed with Consultants :     Plan of care was discussed with patient and /or primary care giver; all questions and concerns were addressed and care was aligned with patient's wishes.

## 2020-12-03 NOTE — PROGRESS NOTE ADULT - ASSESSMENT
85 yr F from home with PMH of HTN, spinal stenosis, scoliosis presents to ED with complaints of  lower abdominal pain and burning with urination since 4 days. Pt reports increased urge to urinate and burning with urination beginning four days ago. Pt also complains dull lower abdominal pain when she urinates, 6/10 in severity and non-radiating. 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 Pt denies blood in urine, diarrhea, vomiting, chest pain, difficulty breathing, fevers, or recent sick contacts.    Pt with positive UA admitted for Sepsis due to UTI, found to have E. Coli on urine Cx,  blood Cx NTD.  Pt. followed by ID Dr. Pulido, on Ceftriaxone D3/7, reports great improvement in symptomatology.  Pt. was also found to have urinary retention on admission, required alfonso placement, alfonso removed in am, voiding with no difficulty.  Pt. noted with A-Fib/Flutter on tele on  rate up to 140bpm, asymptomatic.  Currently NSR on Digoxin, Lopressor and Eliquis, Card Dr. Son.  No further episodes of AF on tele.  ECHO normal with EF 55-60%.  Pt. is now agreeable to discharge to [facility] per PT recommendation.  CM trying to get her to [place], likely Friday.    -Pt. changed her mind about [facility] stating her   there and she doesnt want to be the next to die.  Pt. with unsafe home environment, has a do not discharge order per APS due to extreme hoarding situation.  Pt. awaiting psych eval to determine capacity as she does not allow access to her home for APS clean up, d/w attending.  Pt. is clinically stable for discharge.  -Pt. remains medically stable for discharge.  Psych evaluation appreciated, pt. with no capacity to participate in dispo planning, SW following.    - Pt is stable pending d/c.  However, pt was started on Ceftriaxone, repeat UA + for UTI and will change to Oral Abx per Dr. Serrano since pt does not want to have peripheral IV.

## 2020-12-03 NOTE — PROGRESS NOTE ADULT - SUBJECTIVE AND OBJECTIVE BOX
Patient is seen and examined at the bed side, remains afebrile. She is c/o mild dysuria and repeat Urine analysis as of  is mildly positive,      REVIEW OF SYSTEMS: All other review systems are negative      ALLERGIES: No Known Allergies      Vital Signs Last 24 Hrs  T(C): 36.8 (03 Dec 2020 04:55), Max: 36.8 (03 Dec 2020 04:55)  T(F): 98.3 (03 Dec 2020 04:55), Max: 98.3 (03 Dec 2020 04:55)  HR: 66 (03 Dec 2020 04:55) (66 - 91)  BP: 120/63 (03 Dec 2020 04:55) (115/93 - 120/63)  BP(mean): --  RR: 18 (03 Dec 2020 04:55) (17 - 18)  SpO2: 96% (03 Dec 2020 04:55) (95% - 96%)      PHYSICAL EXAM:  GENERAL: Not in distress   CHEST/LUNG: Not using accessory muscles   HEART: s1 and s2 present  ABDOMEN: Lower abdominal tenderness resolved  EXTREMITIES: No pedal  edema  CNS: Awake and Alert        LABS: No new Labs                            12.1   9.90  )-----------( 363      ( 02 Dec 2020 07:48 )             37.7                       12.3   10.44 )-----------( 351      ( 01 Dec 2020 07:01 )             38.7                         13.0   11.06 )-----------( 367      ( 2020 07:44 )             39.8       12-    138  |  104  |  30<H>  ----------------------------<  86  4.4   |  28  |  1.01    Ca    9.1      02 Dec 2020 07:48      12-    138  |  105  |  35<H>  ----------------------------<  83  4.3   |  27  |  0.94    Ca    9.1      01 Dec 2020 07:01    TPro  5.8<L>  /  Alb  2.1<L>  /  TBili  0.6  /  DBili  x   /  AST  40  /  ALT  27  /  AlkPhos  70  11-23      Urinalysis Basic - ( 2020 16:09 )  Color: Yellow / Appearance: Slightly Turbid / S.015 / pH: x  Gluc: x / Ketone: Negative  / Bili: Negative / Urobili: Negative   Blood: x / Protein: 100 / Nitrite: Positive   Leuk Esterase: Moderate / RBC: 5-10 /HPF / WBC >50 /HPF   Sq Epi: x / Non Sq Epi: Few /HPF / Bacteria: Moderate /HPF        MEDICATIONS  (STANDING):    apixaban 2.5 milliGRAM(s) Oral two times a day  ciprofloxacin     Tablet 500 milliGRAM(s) Oral every 12 hours  digoxin     Tablet 0.125 milliGRAM(s) Oral daily  metoprolol tartrate 25 milliGRAM(s) Oral two times a day  nystatin Ointment 1 Application(s) Topical two times a day  pantoprazole    Tablet 40 milliGRAM(s) Oral before breakfast  sodium chloride 0.9%. 1000 milliLiter(s) (80 mL/Hr) IV Continuous <Continuous>      RADIOLOGY & ADDITIONAL TESTS:    20 : Xray Chest 1 View- PORTABLE-Urgent (Xray Chest 1 View- PORTABLE-Urgent .) (20 @ 18:06) Small left base infiltrate.        MICROBIOLOGY DATA:    Urine Microscopic-Add On (NC) (20 @ 14:00)   Red Blood Cell - Urine: 5-10 /HPF   White Blood Cell - Urine: 6-10 /HPF   Bacteria: Few /HPF   Epithelial Cells: Few /HPF     Culture - Urine (20 @ 02:01)   - Amikacin: S <=16   - Amoxicillin/Clavulanic Acid: S <=8/4   - Ampicillin: S <=8 These ampicillin results predict results for amoxicillin   - Ampicillin/Sulbactam: S <=4/2 Enterobacter, Citrobacter, and Serratia may develop resistance during prolonged therapy (3-4 days)   - Aztreonam: S <=4   - Cefazolin: S <=2 (MIC_CL_COM_ENTERIC_CEFAZU) For uncomplicated UTI with K. pneumoniae, E. coli, or P. mirablis: YULY <=16 is sensitive and YULY >=32 is resistant. This also predicts results for oral agents cefaclor, cefdinir, cefpodoxime, cefprozil, cefuroxime axetil, cephalexin and locarbef for uncomplicated UTI. Note that some isolates may be susceptible to these agents while testing resistant to cefazolin.   - Cefepime: S <=2   - Cefoxitin: S <=8   - Ceftriaxone: S <=1 Enterobacter, Citrobacter, and Serratia may develop resistance during prolonged therapy   - Ciprofloxacin: S <=0.25   - Ertapenem: S <=0.5   - Gentamicin: S <=2   - Imipenem: S <=1   - Levofloxacin: S <=0.5   - Meropenem: S <=1   - Nitrofurantoin: S <=32 Should not be used to treat pyelonephritis   - Piperacillin/Tazobactam: S <=8   - Tigecycline: S <=2   - Tobramycin: S <=2   - Trimethoprim/Sulfamethoxazole: S <=0.5/9.5   Specimen Source: .Urine Clean Catch (Midstream)   Culture Results:   >100,000 CFU/ml Escherichia coli   Culture - Urine (20 @ 02:01)   Specimen Source: .Urine Clean Catch (Midstream)   Culture Results: >100,000 CFU/ml Escherichia coli     Culture - Blood (20 @ 22:16)   Specimen Source: .Blood Blood   Culture Results:  No growth to date.     Culture - Blood (20 @ 22:16)   Specimen Source: .Blood Blood   Culture Results: No growth to date.     Respiratory Viral Panel with COVID-19 by VIDHI (20 @ 16:46)   Rapid RVP Result: NotDete   SARS-CoV-2: NotDete:            Patient is seen and examined at the bed side, remains afebrile. She is tolerating cipro well.       REVIEW OF SYSTEMS: All other review systems are negative      ALLERGIES: No Known Allergies      Vital Signs Last 24 Hrs  T(C): 36.8 (03 Dec 2020 04:55), Max: 36.8 (03 Dec 2020 04:55)  T(F): 98.3 (03 Dec 2020 04:55), Max: 98.3 (03 Dec 2020 04:55)  HR: 66 (03 Dec 2020 04:55) (66 - 91)  BP: 120/63 (03 Dec 2020 04:55) (115/93 - 120/63)  BP(mean): --  RR: 18 (03 Dec 2020 04:55) (17 - 18)  SpO2: 96% (03 Dec 2020 04:55) (95% - 96%)      PHYSICAL EXAM:  GENERAL: Not in distress   CHEST/LUNG: Not using accessory muscles   HEART: s1 and s2 present  ABDOMEN: Lower abdominal tenderness resolved  EXTREMITIES: No pedal  edema  CNS: Awake and Alert        LABS: No new Labs                            12.1   9.90  )-----------( 363      ( 02 Dec 2020 07:48 )             37.7                       12.3   10.44 )-----------( 351      ( 01 Dec 2020 07:01 )             38.7                         13.0   11.06 )-----------( 367      ( 2020 07:44 )             39.8       12    138  |  104  |  30<H>  ----------------------------<  86  4.4   |  28  |  1.01    Ca    9.1      02 Dec 2020 07:48      12-    138  |  105  |  35<H>  ----------------------------<  83  4.3   |  27  |  0.94    Ca    9.1      01 Dec 2020 07:01    TPro  5.8<L>  /  Alb  2.1<L>  /  TBili  0.6  /  DBili  x   /  AST  40  /  ALT  27  /  AlkPhos  70  11-23      Urinalysis Basic - ( 2020 16:09 )  Color: Yellow / Appearance: Slightly Turbid / S.015 / pH: x  Gluc: x / Ketone: Negative  / Bili: Negative / Urobili: Negative   Blood: x / Protein: 100 / Nitrite: Positive   Leuk Esterase: Moderate / RBC: 5-10 /HPF / WBC >50 /HPF   Sq Epi: x / Non Sq Epi: Few /HPF / Bacteria: Moderate /HPF        MEDICATIONS  (STANDING):    apixaban 2.5 milliGRAM(s) Oral two times a day  ciprofloxacin     Tablet 500 milliGRAM(s) Oral every 12 hours  digoxin     Tablet 0.125 milliGRAM(s) Oral daily  metoprolol tartrate 25 milliGRAM(s) Oral two times a day  nystatin Ointment 1 Application(s) Topical two times a day  pantoprazole    Tablet 40 milliGRAM(s) Oral before breakfast  sodium chloride 0.9%. 1000 milliLiter(s) (80 mL/Hr) IV Continuous <Continuous>      RADIOLOGY & ADDITIONAL TESTS:    20 : Xray Chest 1 View- PORTABLE-Urgent (Xray Chest 1 View- PORTABLE-Urgent .) (20 @ 18:06) Small left base infiltrate.        MICROBIOLOGY DATA:    Urine Microscopic-Add On (NC) (20 @ 14:00)   Red Blood Cell - Urine: 5-10 /HPF   White Blood Cell - Urine: 6-10 /HPF   Bacteria: Few /HPF   Epithelial Cells: Few /HPF     Culture - Urine (20 @ 02:01)   - Amikacin: S <=16   - Amoxicillin/Clavulanic Acid: S <=8/4   - Ampicillin: S <=8 These ampicillin results predict results for amoxicillin   - Ampicillin/Sulbactam: S <=4/2 Enterobacter, Citrobacter, and Serratia may develop resistance during prolonged therapy (3-4 days)   - Aztreonam: S <=4   - Cefazolin: S <=2 (MIC_CL_COM_ENTERIC_CEFAZU) For uncomplicated UTI with K. pneumoniae, E. coli, or P. mirablis: YULY <=16 is sensitive and YULY >=32 is resistant. This also predicts results for oral agents cefaclor, cefdinir, cefpodoxime, cefprozil, cefuroxime axetil, cephalexin and locarbef for uncomplicated UTI. Note that some isolates may be susceptible to these agents while testing resistant to cefazolin.   - Cefepime: S <=2   - Cefoxitin: S <=8   - Ceftriaxone: S <=1 Enterobacter, Citrobacter, and Serratia may develop resistance during prolonged therapy   - Ciprofloxacin: S <=0.25   - Ertapenem: S <=0.5   - Gentamicin: S <=2   - Imipenem: S <=1   - Levofloxacin: S <=0.5   - Meropenem: S <=1   - Nitrofurantoin: S <=32 Should not be used to treat pyelonephritis   - Piperacillin/Tazobactam: S <=8   - Tigecycline: S <=2   - Tobramycin: S <=2   - Trimethoprim/Sulfamethoxazole: S <=0.5/9.5   Specimen Source: .Urine Clean Catch (Midstream)   Culture Results:   >100,000 CFU/ml Escherichia coli   Culture - Urine (20 @ 02:01)   Specimen Source: .Urine Clean Catch (Midstream)   Culture Results: >100,000 CFU/ml Escherichia coli     Culture - Blood (20 @ 22:16)   Specimen Source: .Blood Blood   Culture Results:  No growth to date.     Culture - Blood (20 @ 22:16)   Specimen Source: .Blood Blood   Culture Results: No growth to date.     Respiratory Viral Panel with COVID-19 by VIDHI (20 @ 16:46)   Rapid RVP Result: [place]   SARS-CoV-2: NotDetec:
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
## 2020-12-03 NOTE — CHART NOTE - NSCHARTNOTEFT_GEN_A_CORE
Assessment:     Nutrition reassessment for follow-up. Chart reviewed, pt visited, alert, verbally responsive, well-communicated, but forgetful at times reproted; unsafe home environment, pt declining [facility], pending safe discharge planning to home per team,  intervention ongoing     Factors impacting intake: [ ] none [ ] nausea  [ ] vomiting [ ] diarrhea [ ] constipation  [ ]chewing problems [ ] swallowing issues  [ X ] other: acute on chronic comorbidities     Diet Prescription: Diet, Regular:   Supplement Feeding Modality:  Oral  Ensure Enlive Cans or Servings Per Day:  1       Frequency:  Two Times a day (20 @ 13:36)    Intake: eating better, tolerating Ensure supplement, denied GI distress, chewing or swallowing problem at present, no specific food choices to update     Daily Weight in k.8 (03 Dec 2020 04:55)  Weight in k.1 (01 Dec 2020 05:26)  Weight in k.2 (2020 04:54)    % Weight Change: wt changes in house noted, may partly due to scale/fluid variance     Pertinent Medications: MEDICATIONS  (STANDING):  apixaban 2.5 milliGRAM(s) Oral two times a day  ciprofloxacin     Tablet 500 milliGRAM(s) Oral every 12 hours  digoxin     Tablet 0.125 milliGRAM(s) Oral daily  metoprolol tartrate 25 milliGRAM(s) Oral two times a day  nystatin Ointment 1 Application(s) Topical two times a day  pantoprazole    Tablet 40 milliGRAM(s) Oral before breakfast  sodium chloride 0.9%. 1000 milliLiter(s) (80 mL/Hr) IV Continuous <Continuous>    MEDICATIONS  (PRN):  acetaminophen   Tablet .. 650 milliGRAM(s) Oral every 6 hours PRN Temp greater or equal to 38C (100.4F)  acetaminophen   Tablet .. 650 milliGRAM(s) Oral every 6 hours PRN Mild Pain (1 - 3), Moderate Pain (4 - 6)  hydrOXYzine hydrochloride 25 milliGRAM(s) Oral every 8 hours PRN sleep/anxiety    Pertinent Labs:  Na138 mmol/L Glu 86 mg/dL K+ 4.4 mmol/L Cr  1.01 mg/dL BUN 30 mg/dL<H>  Phos 3.3 mg/dL  Chol 115 mg/dL LDL --    HDL 55 mg/dL Trig 71 mg/dL     CAPILLARY BLOOD GLUCOSE    Skin: intact     Estimated Needs:   [ X ] no change since previous assessment  [ ] recalculated:     Previous Nutrition Diagnosis:   [ X ]Inadequate Oral Intake      Nutrition Diagnosis is [ X ] ongoing  [ X ] Improving   [ ] resolved [ ] not applicable     New Nutrition Diagnosis: [ ] not applicable       Interventions:   Recommend  [ ] Change Diet To:  [ X ] Nutrition Supplement: Continue Ensure Enlive  1can ( 240ml ) x bid ( 700 kcal, 40 g protein) as ordered   [ ] Nutrition Support  [ X ] Other: Provide food choices within diet Rx as available/updated     Monitoring and Evaluation:   [ X ] PO intake [ x ] Tolerance to diet prescription [ x ] weights [ x ] labs[ x ] follow up per protocol  [ ] other:
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
## 2020-12-03 NOTE — PROGRESS NOTE ADULT - ASSESSMENT
85 yr F from home with PMH of HTN, spinal stenosis, scoliosis presents to ED with complaints of  lower abdominal pain and burning with urination since 4 days,UTI,PAF with RVR.  1.[facility] now pt refusing to go.SW f/u noted, plan for 24/7 home care when home inspected.  2.UTI-ABX completed.  3.PAF-eliquis,lopressor,dig .125mg qd.  4.PPI.

## 2020-12-03 NOTE — PROGRESS NOTE ADULT - ASSESSMENT
Patient is a 85y old  Female from home with PMH of HTN, spinal stenosis, scoliosis presents to ER for evaluation of  lower abdominal pain and dysuria and increased  urinary frequency. On admission, she found to have fever, tachycardia, Leukocytosis and positive Urine analysis. The CXR shows Small left base infiltrate. She has started on Ceftriaxone and the Id consult requested to assist with further evaluation and antibiotic management.     # Sepsis ( Fever + tachycardia+ Leukocytosis)- The Blood cultures have no growth to date   # UTI - urine culture grew E,.coli.  # Possible pneumonia- on CXR  # Urinary retention - > 700 cc on bladder scan- 11/23- s/p removal of alfonso catheter, 11/25    would recommend:    1. Continue Cipro since  repeat UA is positive   2. PT/OOB to chair     d/w patient and Dr. Serrano     Attending Attestation:    Spent more than 35 minutes on total encounter, more than 50 % of the visit was spent counseling and/or coordinating care by the Attending physician. Patient is a 85y old  Female from home with PMH of HTN, spinal stenosis, scoliosis presents to ER for evaluation of  lower abdominal pain and dysuria and increased  urinary frequency. On admission, she found to have fever, tachycardia, Leukocytosis and positive Urine analysis. The CXR shows Small left base infiltrate. She has started on Ceftriaxone and the Id consult requested to assist with further evaluation and antibiotic management.     # Sepsis ( Fever + tachycardia+ Leukocytosis)- The Blood cultures have no growth to date   # UTI - urine culture grew E,.coli.  # Possible pneumonia- on CXR  # Urinary retention - > 700 cc on bladder scan- 11/23- s/p removal of alfonso catheter, 11/25    would recommend:    1. Continue Cipro since  repeat UA is positive until 12/6/20  2. PT/OOB to chair     d/w patient and Dr. Serrano     Attending Attestation:    Spent more than 35 minutes on total encounter, more than 50 % of the visit was spent counseling and/or coordinating care by the Attending physician.

## 2020-12-04 LAB
ANION GAP SERPL CALC-SCNC: 5 MMOL/L — SIGNIFICANT CHANGE UP (ref 5–17)
BUN SERPL-MCNC: 36 MG/DL — HIGH (ref 7–18)
CALCIUM SERPL-MCNC: 9.3 MG/DL — SIGNIFICANT CHANGE UP (ref 8.4–10.5)
CHLORIDE SERPL-SCNC: 105 MMOL/L — SIGNIFICANT CHANGE UP (ref 96–108)
CO2 SERPL-SCNC: 30 MMOL/L — SIGNIFICANT CHANGE UP (ref 22–31)
CREAT SERPL-MCNC: 0.9 MG/DL — SIGNIFICANT CHANGE UP (ref 0.5–1.3)
GLUCOSE SERPL-MCNC: 83 MG/DL — SIGNIFICANT CHANGE UP (ref 70–99)
HCT VFR BLD CALC: 36.4 % — SIGNIFICANT CHANGE UP (ref 34.5–45)
HGB BLD-MCNC: 11.7 G/DL — SIGNIFICANT CHANGE UP (ref 11.5–15.5)
MCHC RBC-ENTMCNC: 31.4 PG — SIGNIFICANT CHANGE UP (ref 27–34)
MCHC RBC-ENTMCNC: 32.1 GM/DL — SIGNIFICANT CHANGE UP (ref 32–36)
MCV RBC AUTO: 97.6 FL — SIGNIFICANT CHANGE UP (ref 80–100)
NRBC # BLD: 0 /100 WBCS — SIGNIFICANT CHANGE UP (ref 0–0)
PLATELET # BLD AUTO: 296 K/UL — SIGNIFICANT CHANGE UP (ref 150–400)
POTASSIUM SERPL-MCNC: 4 MMOL/L — SIGNIFICANT CHANGE UP (ref 3.5–5.3)
POTASSIUM SERPL-SCNC: 4 MMOL/L — SIGNIFICANT CHANGE UP (ref 3.5–5.3)
RBC # BLD: 3.73 M/UL — LOW (ref 3.8–5.2)
RBC # FLD: 13.7 % — SIGNIFICANT CHANGE UP (ref 10.3–14.5)
SODIUM SERPL-SCNC: 140 MMOL/L — SIGNIFICANT CHANGE UP (ref 135–145)
WBC # BLD: 9.29 K/UL — SIGNIFICANT CHANGE UP (ref 3.8–10.5)
WBC # FLD AUTO: 9.29 K/UL — SIGNIFICANT CHANGE UP (ref 3.8–10.5)

## 2020-12-04 RX ADMIN — Medication 500 MILLIGRAM(S): at 06:42

## 2020-12-04 RX ADMIN — Medication 0.12 MILLIGRAM(S): at 06:42

## 2020-12-04 RX ADMIN — Medication 25 MILLIGRAM(S): at 06:42

## 2020-12-04 RX ADMIN — NYSTATIN CREAM 1 APPLICATION(S): 100000 CREAM TOPICAL at 06:43

## 2020-12-04 RX ADMIN — APIXABAN 2.5 MILLIGRAM(S): 2.5 TABLET, FILM COATED ORAL at 19:29

## 2020-12-04 RX ADMIN — Medication 500 MILLIGRAM(S): at 19:30

## 2020-12-04 RX ADMIN — Medication 650 MILLIGRAM(S): at 00:45

## 2020-12-04 RX ADMIN — APIXABAN 2.5 MILLIGRAM(S): 2.5 TABLET, FILM COATED ORAL at 06:42

## 2020-12-04 RX ADMIN — Medication 25 MILLIGRAM(S): at 19:30

## 2020-12-04 RX ADMIN — NYSTATIN CREAM 1 APPLICATION(S): 100000 CREAM TOPICAL at 19:30

## 2020-12-04 RX ADMIN — PANTOPRAZOLE SODIUM 40 MILLIGRAM(S): 20 TABLET, DELAYED RELEASE ORAL at 06:42

## 2020-12-04 NOTE — PROGRESS NOTE ADULT - ASSESSMENT
Patient is a 85y old  Female from home with PMH of HTN, spinal stenosis, scoliosis presents to ER for evaluation of  lower abdominal pain and dysuria and increased  urinary frequency. On admission, she found to have fever, tachycardia, Leukocytosis and positive Urine analysis. The CXR shows Small left base infiltrate. She has started on Ceftriaxone and the Id consult requested to assist with further evaluation and antibiotic management.     # Sepsis ( Fever + tachycardia+ Leukocytosis)- The Blood cultures have no growth to date   # UTI - urine culture grew E,.coli.  # Possible pneumonia- on CXR  # Urinary retention - > 700 cc on bladder scan- 11/23- s/p removal of alfonso catheter, 11/25    would recommend:    1. Continue Cipro since  repeat UA is positive until 12/6/20  2. PT/OOB to chair         Attending Attestation:    Spent more than 35 minutes on total encounter, more than 50 % of the visit was spent counseling and/or coordinating care by the Attending physician. Patient is a 85y old  Female from home with PMH of HTN, spinal stenosis, scoliosis presents to ER for evaluation of  lower abdominal pain and dysuria and increased  urinary frequency. On admission, she found to have fever, tachycardia, Leukocytosis and positive Urine analysis. The CXR shows Small left base infiltrate. She has started on Ceftriaxone and the Id consult requested to assist with further evaluation and antibiotic management.     # Sepsis ( Fever + tachycardia+ Leukocytosis)- The Blood cultures have no growth to date   # UTI - urine culture grew E. coli.  # Possible pneumonia- on CXR  # Urinary retention - > 700 cc on bladder scan- 11/23- s/p removal of alfonso catheter, 11/25    would recommend:    1. Continue Cipro since repeat UA is positive until 12/6/20  2. PT/OOB to chair       Attending Attestation:    Spent more than 35 minutes on total encounter, more than 50 % of the visit was spent counseling and/or coordinating care by the Attending physician.

## 2020-12-04 NOTE — PROGRESS NOTE ADULT - PROBLEM SELECTOR PLAN 7
Pt. lives alone.  Her  passed away due to COVID last May, she has no children or other relatives,  Pt.'s neighbor calls frequently to inform us that pt. will need assistance/placement as her home situation is unsafe due to hoarding.  PT recommended [facility]  Pt. adamantly refusing [facility].  Can not discharge to home as she as a do not discharge order from [facility]  SW/CM following  Awaiting psych eval
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
## 2020-12-04 NOTE — PROGRESS NOTE ADULT - SUBJECTIVE AND OBJECTIVE BOX
Patient is seen and examined at the bed side, remains afebrile. She is tolerating cipro well.       REVIEW OF SYSTEMS: All other review systems are negative      ALLERGIES: No Known Allergies      Vital Signs Last 24 Hrs  T(C): 36.6 (04 Dec 2020 06:01), Max: 36.7 (03 Dec 2020 21:43)  T(F): 97.9 (04 Dec 2020 06:01), Max: 98.1 (03 Dec 2020 21:43)  HR: 72 (04 Dec 2020 06:01) (72 - 86)  BP: 129/63 (04 Dec 2020 06:01) (115/88 - 129/63)  BP(mean): --  RR: 18 (04 Dec 2020 06:01) (17 - 18)  SpO2: 96% (04 Dec 2020 06:01) (94% - 96%)      PHYSICAL EXAM:  GENERAL: Not in distress   CHEST/LUNG: Not using accessory muscles   HEART: s1 and s2 present  ABDOMEN: Lower abdominal tenderness resolved  EXTREMITIES: No pedal  edema  CNS: Awake and Alert        LABS:                         11.7   9.29  )-----------( 296      ( 04 Dec 2020 06:20 )             36.4                         12.3   10.44 )-----------( 351      ( 01 Dec 2020 07:01 )             38.7                         13.0   11.06 )-----------( 367      ( 2020 07:44 )             39.8         12-04    140  |  105  |  36<H>  ----------------------------<  83  4.0   |  30  |  0.90    Ca    9.3      04 Dec 2020 06:20      12-02    138  |  104  |  30<H>  ----------------------------<  86  4.4   |  28  |  1.01    Ca    9.1      02 Dec 2020 07:48    TPro  5.8<L>  /  Alb  2.1<L>  /  TBili  0.6  /  DBili  x   /  AST  40  /  ALT  27  /  AlkPhos  70  11-23      Urinalysis Basic - ( 2020 16:09 )  Color: Yellow / Appearance: Slightly Turbid / S.015 / pH: x  Gluc: x / Ketone: Negative  / Bili: Negative / Urobili: Negative   Blood: x / Protein: 100 / Nitrite: Positive   Leuk Esterase: Moderate / RBC: 5-10 /HPF / WBC >50 /HPF   Sq Epi: x / Non Sq Epi: Few /HPF / Bacteria: Moderate /HPF        MEDICATIONS  (STANDING):    apixaban 2.5 milliGRAM(s) Oral every 12 hours  ciprofloxacin     Tablet 500 milliGRAM(s) Oral every 12 hours  digoxin     Tablet 0.125 milliGRAM(s) Oral daily  metoprolol tartrate 25 milliGRAM(s) Oral two times a day  nystatin Ointment 1 Application(s) Topical two times a day  pantoprazole    Tablet 40 milliGRAM(s) Oral before breakfast  sodium chloride 0.9%. 1000 milliLiter(s) (80 mL/Hr) IV Continuous <Continuous>      RADIOLOGY & ADDITIONAL TESTS:    20 : Xray Chest 1 View- PORTABLE-Urgent (Xray Chest 1 View- PORTABLE-Urgent .) (20 @ 18:06) Small left base infiltrate.        MICROBIOLOGY DATA:    Urine Microscopic-Add On (NC) (20 @ 14:00)   Red Blood Cell - Urine: 5-10 /HPF   White Blood Cell - Urine: 6-10 /HPF   Bacteria: Few /HPF   Epithelial Cells: Few /HPF     Culture - Urine (20 @ 02:01)   - Amikacin: S <=16   - Amoxicillin/Clavulanic Acid: S <=8/4   - Ampicillin: S <=8 These ampicillin results predict results for amoxicillin   - Ampicillin/Sulbactam: S <=4/2 Enterobacter, Citrobacter, and Serratia may develop resistance during prolonged therapy (3-4 days)   - Aztreonam: S <=4   - Cefazolin: S <=2 (MIC_CL_COM_ENTERIC_CEFAZU) For uncomplicated UTI with K. pneumoniae, E. coli, or P. mirablis: YULY <=16 is sensitive and YULY >=32 is resistant. This also predicts results for oral agents cefaclor, cefdinir, cefpodoxime, cefprozil, cefuroxime axetil, cephalexin and locarbef for uncomplicated UTI. Note that some isolates may be susceptible to these agents while testing resistant to cefazolin.   - Cefepime: S <=2   - Cefoxitin: S <=8   - Ceftriaxone: S <=1 Enterobacter, Citrobacter, and Serratia may develop resistance during prolonged therapy   - Ciprofloxacin: S <=0.25   - Ertapenem: S <=0.5   - Gentamicin: S <=2   - Imipenem: S <=1   - Levofloxacin: S <=0.5   - Meropenem: S <=1   - Nitrofurantoin: S <=32 Should not be used to treat pyelonephritis   - Piperacillin/Tazobactam: S <=8   - Tigecycline: S <=2   - Tobramycin: S <=2   - Trimethoprim/Sulfamethoxazole: S <=0.5/9.5   Specimen Source: .Urine Clean Catch (Midstream)   Culture Results:   >100,000 CFU/ml Escherichia coli   Culture - Urine (20 @ 02:01)   Specimen Source: .Urine Clean Catch (Midstream)   Culture Results: >100,000 CFU/ml Escherichia coli     Culture - Blood (20 @ 22:16)   Specimen Source: .Blood Blood   Culture Results:  No growth to date.     Culture - Blood (20 @ 22:16)   Specimen Source: .Blood Blood   Culture Results: No growth to date.     Respiratory Viral Panel with COVID-19 by VIDHI (20 @ 16:46)   Rapid RVP Result: Chelseatenavin   SARS-CoV-2: NotDetec:            Patient is seen and examined at the bed side, remains afebrile. She is anxious about being discharge in rehab rather than her home.       REVIEW OF SYSTEMS: All other review systems are negative      ALLERGIES: No Known Allergies      Vital Signs Last 24 Hrs  T(C): 36.6 (04 Dec 2020 06:01), Max: 36.7 (03 Dec 2020 21:43)  T(F): 97.9 (04 Dec 2020 06:01), Max: 98.1 (03 Dec 2020 21:43)  HR: 72 (04 Dec 2020 06:01) (72 - 86)  BP: 129/63 (04 Dec 2020 06:01) (115/88 - 129/63)  BP(mean): --  RR: 18 (04 Dec 2020 06:01) (17 - 18)  SpO2: 96% (04 Dec 2020 06:01) (94% - 96%)      PHYSICAL EXAM:  GENERAL: Not in distress   CHEST/LUNG: Not using accessory muscles   HEART: s1 and s2 present  ABDOMEN: Lower abdominal tenderness resolved  EXTREMITIES: No pedal  edema  CNS: Awake and Alert        LABS:                         11.7   9.29  )-----------( 296      ( 04 Dec 2020 06:20 )             36.4                         12.3   10.44 )-----------( 351      ( 01 Dec 2020 07:01 )             38.7                         13.0   11.06 )-----------( 367      ( 2020 07:44 )             39.8         12-04    140  |  105  |  36<H>  ----------------------------<  83  4.0   |  30  |  0.90    Ca    9.3      04 Dec 2020 06:20      12-02    138  |  104  |  30<H>  ----------------------------<  86  4.4   |  28  |  1.01    Ca    9.1      02 Dec 2020 07:48    TPro  5.8<L>  /  Alb  2.1<L>  /  TBili  0.6  /  DBili  x   /  AST  40  /  ALT  27  /  AlkPhos  70  11-23      Urinalysis Basic - ( 2020 16:09 )  Color: Yellow / Appearance: Slightly Turbid / S.015 / pH: x  Gluc: x / Ketone: Negative  / Bili: Negative / Urobili: Negative   Blood: x / Protein: 100 / Nitrite: Positive   Leuk Esterase: Moderate / RBC: 5-10 /HPF / WBC >50 /HPF   Sq Epi: x / Non Sq Epi: Few /HPF / Bacteria: Moderate /HPF        MEDICATIONS  (STANDING):    apixaban 2.5 milliGRAM(s) Oral every 12 hours  ciprofloxacin     Tablet 500 milliGRAM(s) Oral every 12 hours  digoxin     Tablet 0.125 milliGRAM(s) Oral daily  metoprolol tartrate 25 milliGRAM(s) Oral two times a day  nystatin Ointment 1 Application(s) Topical two times a day  pantoprazole    Tablet 40 milliGRAM(s) Oral before breakfast  sodium chloride 0.9%. 1000 milliLiter(s) (80 mL/Hr) IV Continuous <Continuous>      RADIOLOGY & ADDITIONAL TESTS:    20 : Xray Chest 1 View- PORTABLE-Urgent (Xray Chest 1 View- PORTABLE-Urgent .) (20 @ 18:06) Small left base infiltrate.        MICROBIOLOGY DATA:    Urine Microscopic-Add On (NC) (20 @ 14:00)   Red Blood Cell - Urine: 5-10 /HPF   White Blood Cell - Urine: 6-10 /HPF   Bacteria: Few /HPF   Epithelial Cells: Few /HPF     Culture - Urine (20 @ 02:01)   - Amikacin: S <=16   - Amoxicillin/Clavulanic Acid: S <=8/4   - Ampicillin: S <=8 These ampicillin results predict results for amoxicillin   - Ampicillin/Sulbactam: S <=4/2 Enterobacter, Citrobacter, and Serratia may develop resistance during prolonged therapy (3-4 days)   - Aztreonam: S <=4   - Cefazolin: S <=2 (MIC_CL_COM_ENTERIC_CEFAZU) For uncomplicated UTI with K. pneumoniae, E. coli, or P. mirablis: YULY <=16 is sensitive and YULY >=32 is resistant. This also predicts results for oral agents cefaclor, cefdinir, cefpodoxime, cefprozil, cefuroxime axetil, cephalexin and locarbef for uncomplicated UTI. Note that some isolates may be susceptible to these agents while testing resistant to cefazolin.   - Cefepime: S <=2   - Cefoxitin: S <=8   - Ceftriaxone: S <=1 Enterobacter, Citrobacter, and Serratia may develop resistance during prolonged therapy   - Ciprofloxacin: S <=0.25   - Ertapenem: S <=0.5   - Gentamicin: S <=2   - Imipenem: S <=1   - Levofloxacin: S <=0.5   - Meropenem: S <=1   - Nitrofurantoin: S <=32 Should not be used to treat pyelonephritis   - Piperacillin/Tazobactam: S <=8   - Tigecycline: S <=2   - Tobramycin: S <=2   - Trimethoprim/Sulfamethoxazole: S <=0.5/9.5   Specimen Source: .Urine Clean Catch (Midstream)   Culture Results:   >100,000 CFU/ml Escherichia coli   Culture - Urine (20 @ 02:01)   Specimen Source: .Urine Clean Catch (Midstream)   Culture Results: >100,000 CFU/ml Escherichia coli     Culture - Blood (20 @ 22:16)   Specimen Source: .Blood Blood   Culture Results:  No growth to date.     Culture - Blood (20 @ 22:16)   Specimen Source: .Blood Blood   Culture Results: No growth to date.     Respiratory Viral Panel with COVID-19 by VIDHI (20 @ 16:46)   Rapid RVP Result: Chelseatenavin   SARS-CoV-2: NotDetec:

## 2020-12-04 NOTE — PROGRESS NOTE ADULT - ASSESSMENT
85 yr F from home with PMH of HTN, spinal stenosis, scoliosis presents to ED with complaints of  lower abdominal pain and burning with urination since 4 days,UTI,PAF with RVR.  1.[facility] now pt refusing to go.SW f/u noted, plan for 24/7 home care when home inspected.  2.UTI-ABX.  3.PAF-eliquis,lopressor,dig .125mg qd.  4.PPI.

## 2020-12-04 NOTE — PROGRESS NOTE ADULT - SUBJECTIVE AND OBJECTIVE BOX
JOSE GARDNER  MR# [id number]  85yFemale        Patient is a 85y old  Female who presents with a chief complaint of abdominal pain and urinary symptoms (04 Dec 2020 12:12)      INTERVAL HPI/OVERNIGHT EVENTS:  Patient seen and examined at bedside. No notations of chest pain, palpitation, SOB, orthopnea, nausea, vomiting or abdominal pain.    ALLERGIES  No Known Allergies      MEDICATIONS  acetaminophen   Tablet .. 650 milliGRAM(s) Oral every 6 hours PRN Temp greater or equal to 38C (100.4F)  acetaminophen   Tablet .. 650 milliGRAM(s) Oral every 6 hours PRN Mild Pain (1 - 3), Moderate Pain (4 - 6)  apixaban 2.5 milliGRAM(s) Oral every 12 hours  ciprofloxacin     Tablet 500 milliGRAM(s) Oral every 12 hours  digoxin     Tablet 0.125 milliGRAM(s) Oral daily  hydrOXYzine hydrochloride 25 milliGRAM(s) Oral every 8 hours PRN sleep/anxiety  metoprolol tartrate 25 milliGRAM(s) Oral two times a day  nystatin Ointment 1 Application(s) Topical two times a day  pantoprazole    Tablet 40 milliGRAM(s) Oral before breakfast  sodium chloride 0.9%. 1000 milliLiter(s) IV Continuous <Continuous>              REVIEW OF SYSTEMS:  CONSTITUTIONAL: No fever, weight loss, or fatigue  EYES: No eye pain, visual disturbances, or discharge  ENT:  No difficulty hearing, tinnitus, vertigo; No sinus or throat pain  NECK: No pain or stiffness  RESPIRATORY: No cough, wheezing, chills or hemoptysis; No Shortness of Breath  CARDIOVASCULAR: No chest pain, palpitations, passing out, dizziness, or leg swelling  GASTROINTESTINAL: No abdominal or epigastric pain. No nausea, vomiting, or hematemesis; No diarrhea or constipation. No melena or hematochezia.  GENITOURINARY: No dysuria, frequency, hematuria, or incontinence  NEUROLOGICAL: No headaches, memory loss, loss of strength, numbness, or tremors  SKIN: No itching, burning, rashes, or lesions   LYMPH Nodes: No enlarged glands  ENDOCRINE: No heat or cold intolerance; No hair loss  MUSCULOSKELETAL: No joint pain or swelling; No muscle, back, or extremity pain  PSYCHIATRIC: No depression, anxiety, mood swings, or difficulty sleeping  HEME/LYMPH: No easy bruising, or bleeding gums  ALLERGY AND IMMUNOLOGIC: No hives or eczema	    [ ] All others negative	  [ ] Unable to obtain      T(C): 36.6 (12-04-20 @ 06:01), Max: 37.2 (12-03-20 @ 14:12)  T(F): 97.9 (12-04-20 @ 06:01), Max: 98.9 (12-03-20 @ 14:12)  HR: 72 (12-04-20 @ 06:01) (72 - 86)  BP: 129/63 (12-04-20 @ 06:01) (112/51 - 129/63)  RR: 18 (12-04-20 @ 06:01) (17 - 18)  SpO2: 96% (12-04-20 @ 06:01) (94% - 96%)  Wt(kg): --    I&O's Summary    03 Dec 2020 07:01  -  04 Dec 2020 07:00  --------------------------------------------------------  IN: 240 mL / OUT: 0 mL / NET: 240 mL          PHYSICAL EXAM:  A X O x  HEAD:  Atraumatic, Normocephalic  EYES: EOMI, PERRLA, conjunctiva and sclera clear  NECK: Supple, No JVD, Normal thyroid  Resp: CTAB, No crackles, wheezing,   CVS: Regular rate and rhythm; No discernable murmurs, rubs, or gallops  ABD: Soft, Nontender, Nondistended; Bowel sounds present  EXTREMITIES:  2+ Peripheral Pulses, No edema  LYMPH: No dicernable lymphadenopathy noted  GENERAL: NAD, well-groomed, well-developed      LABS:                        11.7   9.29  )-----------( 296      ( 04 Dec 2020 06:20 )             36.4     12-04    140  |  105  |  36<H>  ----------------------------<  83  4.0   |  30  |  0.90    Ca    9.3      04 Dec 2020 06:20          CAPILLARY BLOOD GLUCOSE          Troponins:  ProBNP:  Lipid Profile:   HgA1c:  TSH:           RADIOLOGY & ADDITIONAL TESTS:    Imaging Personally Reviewed:  [ ] YES  [ ] NO      Consultant(s) Notes Reviewed:  [x ] YES  [ ] NO    Care Discussed with Consultants/Other Providers [ x] YES  [ ] NO          PAST MEDICAL & SURGICAL HISTORY:  Spinal stenosis, unspecified spinal region    Scoliosis, unspecified scoliosis type, unspecified spinal region    Cataract of both eyes, unspecified cataract type          Sepsis    No pertinent family history in first degree relatives    Handoff    MEWS Score    Spinal stenosis, unspecified spinal region    Scoliosis, unspecified scoliosis type, unspecified spinal region    Sepsis without acute organ dysfunction, due to unspecified organism    Anxiety    Adjustment disorder    Adjustment disorder with anxiety    Discharge planning issues    Hypoalbuminemia due to protein-calorie malnutrition    Urinary retention    Paroxysmal atrial fibrillation with RVR    HTN (hypertension)    Need for prophylactic measure    Sepsis secondary to UTI    Cataract of both eyes, unspecified cataract type    A ABDOMINAL PAIN    Urinary tract infection without hematuria, site unspecified    SysAdmin_VisitLink

## 2020-12-04 NOTE — PROGRESS NOTE ADULT - SUBJECTIVE AND OBJECTIVE BOX
CHIEF COMPLAINT:Patient is a 85y old  Female who presents with a chief complaint of abdominal pain and urinary symptoms.Pt appears comfortable.    	  REVIEW OF SYSTEMS:  CONSTITUTIONAL: No fever, weight loss, or fatigue  EYES: No eye pain, visual disturbances, or discharge  ENT:  No difficulty hearing, tinnitus, vertigo; No sinus or throat pain  NECK: No pain or stiffness  RESPIRATORY: No cough, wheezing, chills or hemoptysis; No Shortness of Breath  CARDIOVASCULAR: No chest pain, palpitations, passing out, dizziness, or leg swelling  GASTROINTESTINAL: No abdominal or epigastric pain. No nausea, vomiting, or hematemesis; No diarrhea or constipation. No melena or hematochezia.  GENITOURINARY: No dysuria, frequency, hematuria, or incontinence  NEUROLOGICAL: No headaches, memory loss, loss of strength, numbness, or tremors  SKIN: No itching, burning, rashes, or lesions   LYMPH Nodes: No enlarged glands  ENDOCRINE: No heat or cold intolerance; No hair loss  MUSCULOSKELETAL: No joint pain or swelling; No muscle, back, or extremity pain  PSYCHIATRIC: No depression, anxiety, mood swings, or difficulty sleeping  HEME/LYMPH: No easy bruising, or bleeding gums  ALLERGY AND IMMUNOLOGIC: No hives or eczema	        PHYSICAL EXAM:  T(C): 36.6 (12-04-20 @ 06:01), Max: 37.2 (12-03-20 @ 14:12)  HR: 72 (12-04-20 @ 06:01) (72 - 86)  BP: 129/63 (12-04-20 @ 06:01) (112/51 - 129/63)  RR: 18 (12-04-20 @ 06:01) (17 - 18)  SpO2: 96% (12-04-20 @ 06:01) (94% - 96%)      03 Dec 2020 07:01  -  04 Dec 2020 07:00  --------------------------------------------------------  IN: 240 mL / OUT: 0 mL / NET: 240 mL        Appearance: Normal	  HEENT:   Normal oral mucosa, PERRL, EOMI	  Lymphatic: No lymphadenopathy  Cardiovascular: Normal S1 S2, No JVD, No murmurs, No edema  Respiratory: Lungs clear to auscultation	  Psychiatry: A & O x 3, Mood & affect appropriate  Gastrointestinal:  Soft, Non-tender, + BS	  Skin: No rashes, No ecchymoses, No cyanosis	  Neurologic: Non-focal  Extremities: Normal range of motion, No clubbing, cyanosis or edema  Vascular: Peripheral pulses palpable 2+ bilaterally    MEDICATIONS  (STANDING):  apixaban 2.5 milliGRAM(s) Oral every 12 hours  ciprofloxacin     Tablet 500 milliGRAM(s) Oral every 12 hours  digoxin     Tablet 0.125 milliGRAM(s) Oral daily  metoprolol tartrate 25 milliGRAM(s) Oral two times a day  nystatin Ointment 1 Application(s) Topical two times a day  pantoprazole    Tablet 40 milliGRAM(s) Oral before breakfast  sodium chloride 0.9%. 1000 milliLiter(s) (80 mL/Hr) IV Continuous <Continuous>      	  	  LABS:	 	                         11.7   9.29  )-----------( 296      ( 04 Dec 2020 06:20 )             36.4     12-04    140  |  105  |  36<H>  ----------------------------<  83  4.0   |  30  |  0.90    Ca    9.3      04 Dec 2020 06:20        Lipid Profile: Cholesterol 115  LDL --  HDL 55  TG 71      TSH: Thyroid Stimulating Hormone, Serum: 1.90 uU/mL (11-23 @ 06:02)

## 2020-12-04 NOTE — PROGRESS NOTE ADULT - SUBJECTIVE AND OBJECTIVE BOX
NP Note discussed with Primary Attending.      Patient is a 85y old  Female who presents with a chief complaint of abdominal pain and urinary symptoms (04 Dec 2020 11:12)      INTERVAL HPI/OVERNIGHT EVENTS: no new complaints    MEDICATIONS  (STANDING):  apixaban 2.5 milliGRAM(s) Oral every 12 hours  ciprofloxacin     Tablet 500 milliGRAM(s) Oral every 12 hours  digoxin     Tablet 0.125 milliGRAM(s) Oral daily  metoprolol tartrate 25 milliGRAM(s) Oral two times a day  nystatin Ointment 1 Application(s) Topical two times a day  pantoprazole    Tablet 40 milliGRAM(s) Oral before breakfast  sodium chloride 0.9%. 1000 milliLiter(s) (80 mL/Hr) IV Continuous <Continuous>    MEDICATIONS  (PRN):  acetaminophen   Tablet .. 650 milliGRAM(s) Oral every 6 hours PRN Temp greater or equal to 38C (100.4F)  acetaminophen   Tablet .. 650 milliGRAM(s) Oral every 6 hours PRN Mild Pain (1 - 3), Moderate Pain (4 - 6)  hydrOXYzine hydrochloride 25 milliGRAM(s) Oral every 8 hours PRN sleep/anxiety      __________________________________________________  REVIEW OF SYSTEMS:    CONSTITUTIONAL: No fever,   EYES: no acute visual disturbances  NECK: No pain or stiffness  RESPIRATORY: No cough; No shortness of breath  CARDIOVASCULAR: No chest pain, no palpitations  GASTROINTESTINAL: No pain. No nausea or vomiting; No diarrhea   NEUROLOGICAL: No headache or numbness, no tremors  MUSCULOSKELETAL: No joint pain, no muscle pain  GENITOURINARY: no dysuria, no frequency, no hesitancy  PSYCHIATRY: no depression , no anxiety  ALL OTHER  ROS negative        Vital Signs Last 24 Hrs  T(C): 36.6 (04 Dec 2020 06:01), Max: 37.2 (03 Dec 2020 14:12)  T(F): 97.9 (04 Dec 2020 06:01), Max: 98.9 (03 Dec 2020 14:12)  HR: 72 (04 Dec 2020 06:01) (72 - 86)  BP: 129/63 (04 Dec 2020 06:01) (112/51 - 129/63)  BP(mean): --  RR: 18 (04 Dec 2020 06:01) (17 - 18)  SpO2: 96% (04 Dec 2020 06:01) (94% - 96%)    ________________________________________________  PHYSICAL EXAM:  GENERAL: NAD  HEENT: Normocephalic;  conjunctivae and sclerae clear; moist mucous membranes;   NECK : supple  CHEST/LUNG: Clear to auscultation bilaterally with good air entry   HEART: S1 S2  regular; no murmurs, gallops or rubs  ABDOMEN: Soft, Nontender, Nondistended; Bowel sounds present  EXTREMITIES: no cyanosis; no edema; no calf tenderness  SKIN: warm and dry; no rash  NERVOUS SYSTEM:  Awake and alert; Oriented  to place, person and time ; no new deficits    _________________________________________________  LABS:                        11.7   9.29  )-----------( 296      ( 04 Dec 2020 06:20 )             36.4     12-04    140  |  105  |  36<H>  ----------------------------<  83  4.0   |  30  |  0.90    Ca    9.3      04 Dec 2020 06:20          CAPILLARY BLOOD GLUCOSE            RADIOLOGY & ADDITIONAL TESTS:        EXAM:  XR CHEST PORTABLE URGENT 1V                            PROCEDURE DATE:  11/22/2020          INTERPRETATION:  Chest one view    HISTORY: Sepsis    COMPARISON STUDY: None available    Frontal expiratory view of the chest shows the heart to be enlarged in size. The lungs show small left base infiltrate and there is no evidence of pneumothorax nor pleural effusion.    IMPRESSION:  Small left base infiltrate.      Thank you for the courtesy of this referral.      JAYME MAC MD; Attending Interventional Radiologist  This document has been electronically signed. Nov 23 2020  9:49AM    Imaging  Reviewed:  YES    Consultant(s) Notes Reviewed:   YES      Plan of care was discussed with patient and /or primary care giver; all questions and concerns were addressed

## 2020-12-04 NOTE — PROGRESS NOTE ADULT - ASSESSMENT
HPI:  Patient is 85 y old  F from home with PMH of HTN, spinal stenosis, scoliosis presents to ED with complaints of  lower abdominal pain and burning with urination since 4 days. Pt reports increased urge to urinate and burning with urination beginning four days ago. Pt also complains dull lower abdominal pain when she urinates, 6/10 in severity and non-radiating. 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 Pt denies blood in urine, diarrhea, vomiting, chest pain, difficulty breathing, fevers, or recent sick contacts.    Pt with positive UA admitted for Sepsis due to UTI, found to have E. Coli on urine Cx,  blood Cx NTD.  Pt. followed by ID Dr. Pulido, on Ceftriaxone D3/7, reports great improvement in symptomatology.  Pt. was also found to have urinary retention on admission, required alfonso placement, alfonso removed in am, voiding with no difficulty.  Pt. noted with A-Fib/Flutter on tele on  rate up to 140bpm, asymptomatic.  Currently NSR on Digoxin, Lopressor and Eliquis, Card Dr. Son.  No further episodes of AF on tele.  ECHO normal with EF 55-60%.  Pt. is now agreeable to discharge to [facility] per PT recommendation.  CM trying to get her to [place], likely Friday.    -Pt. changed her mind about [facility] stating her   there and she doesn't want to be the next to die.  Pt. with unsafe home environment, has a do not discharge order per APS due to extreme hoarding situation.  Pt. awaiting psych eval to determine capacity as she does not allow access to her home for APS clean up, d/w attending.  Pt. is clinically stable for discharge.    Patient seen and examined at bedside. Denies CP,SOB, abdominal pain. Pending placement, SW onboard.

## 2020-12-04 NOTE — PROGRESS NOTE ADULT - ATTENDING COMMENTS
Psych consult noted and much appreciated. Pt deemed without capacity regarding d/c planning given limited insight in the past into her hoarding problem - being a fire hazard. Pt has been AAOx3 and very polite and cooperative with good insight now into her problem of hoarding, agreeing "it absolutely needs to be taken care of". However pt's long standing close friend (Linda Enciso @[phone]), which pt herself is willing to appoint as her HCP, has contacted me and SW and is aware of pt's home setting (confounded with the hoarding situation), making it unsafe for pt's habitation. Pt at present has no next of kin aside from estranged nieces / nephews in [place]. Pt herself prefers her friend Linda to be her healthcare proxy.     SW is aware of this, it is hoped that the home setting situation can be remedied through her HCP (to APS's satisfaction) so that pt can be d/c to the home setting under the HCP's guardianship / supervision, in concurrence to pt's own wishes.     I had a conference call today by pt's bedside with her HCP on speaker, she is now working with the Robert Applebaum MD to have the apartment cleaned of all the overwhelming paper, books and journals.    12/3/20 - Awaiting scheduled cleaning crew for tomorrow to clean pt's apartment.    12/4/20- Pt's HCP has been chosen by pt to also be her potential guardian (and financial power of ) so that the HCP can now also excersize any financial decisions on patients behalf, whereby before she could not have done. As one home care agency was not willing to take on pt given lack of a financial garunteer.

## 2020-12-05 RX ADMIN — APIXABAN 2.5 MILLIGRAM(S): 2.5 TABLET, FILM COATED ORAL at 05:40

## 2020-12-05 RX ADMIN — Medication 25 MILLIGRAM(S): at 05:41

## 2020-12-05 RX ADMIN — Medication 650 MILLIGRAM(S): at 00:17

## 2020-12-05 RX ADMIN — Medication 500 MILLIGRAM(S): at 05:40

## 2020-12-05 RX ADMIN — Medication 25 MILLIGRAM(S): at 18:08

## 2020-12-05 RX ADMIN — Medication 500 MILLIGRAM(S): at 18:07

## 2020-12-05 RX ADMIN — APIXABAN 2.5 MILLIGRAM(S): 2.5 TABLET, FILM COATED ORAL at 18:09

## 2020-12-05 RX ADMIN — NYSTATIN CREAM 1 APPLICATION(S): 100000 CREAM TOPICAL at 21:50

## 2020-12-05 RX ADMIN — Medication 650 MILLIGRAM(S): at 23:51

## 2020-12-05 RX ADMIN — Medication 0.12 MILLIGRAM(S): at 05:41

## 2020-12-05 RX ADMIN — PANTOPRAZOLE SODIUM 40 MILLIGRAM(S): 20 TABLET, DELAYED RELEASE ORAL at 05:41

## 2020-12-05 RX ADMIN — Medication 650 MILLIGRAM(S): at 01:36

## 2020-12-05 RX ADMIN — NYSTATIN CREAM 1 APPLICATION(S): 100000 CREAM TOPICAL at 05:43

## 2020-12-05 NOTE — PROGRESS NOTE ADULT - SUBJECTIVE AND OBJECTIVE BOX
Patient is seen and examined at the bed side, remains afebrile. She mentioned feeling better.       REVIEW OF SYSTEMS: All other review systems are negative      ALLERGIES: No Known Allergies      Vital Signs Last 24 Hrs  T(C): 36.6 (04 Dec 2020 06:01), Max: 36.7 (03 Dec 2020 21:43)  T(F): 97.9 (04 Dec 2020 06:01), Max: 98.1 (03 Dec 2020 21:43)  HR: 72 (04 Dec 2020 06:01) (72 - 86)  BP: 129/63 (04 Dec 2020 06:01) (115/88 - 129/63)  BP(mean): --  RR: 18 (04 Dec 2020 06:01) (17 - 18)  SpO2: 96% (04 Dec 2020 06:01) (94% - 96%)      PHYSICAL EXAM:  GENERAL: Not in distress   CHEST/LUNG: Not using accessory muscles   HEART: s1 and s2 present  ABDOMEN: Lower abdominal tenderness resolved  EXTREMITIES: No pedal  edema  CNS: Awake and Alert        LABS:                         11.7   9.29  )-----------( 296      ( 04 Dec 2020 06:20 )             36.4                         12.3   10.44 )-----------( 351      ( 01 Dec 2020 07:01 )             38.7                         13.0   11.06 )-----------( 367      ( 2020 07:44 )             39.8         12-04    140  |  105  |  36<H>  ----------------------------<  83  4.0   |  30  |  0.90    Ca    9.3      04 Dec 2020 06:20      12-02    138  |  104  |  30<H>  ----------------------------<  86  4.4   |  28  |  1.01    Ca    9.1      02 Dec 2020 07:48    TPro  5.8<L>  /  Alb  2.1<L>  /  TBili  0.6  /  DBili  x   /  AST  40  /  ALT  27  /  AlkPhos  70  11-23      Urinalysis Basic - ( 2020 16:09 )  Color: Yellow / Appearance: Slightly Turbid / S.015 / pH: x  Gluc: x / Ketone: Negative  / Bili: Negative / Urobili: Negative   Blood: x / Protein: 100 / Nitrite: Positive   Leuk Esterase: Moderate / RBC: 5-10 /HPF / WBC >50 /HPF   Sq Epi: x / Non Sq Epi: Few /HPF / Bacteria: Moderate /HPF        MEDICATIONS  (STANDING):    apixaban 2.5 milliGRAM(s) Oral every 12 hours  ciprofloxacin     Tablet 500 milliGRAM(s) Oral every 12 hours  digoxin     Tablet 0.125 milliGRAM(s) Oral daily  metoprolol tartrate 25 milliGRAM(s) Oral two times a day  nystatin Ointment 1 Application(s) Topical two times a day  pantoprazole    Tablet 40 milliGRAM(s) Oral before breakfast  sodium chloride 0.9%. 1000 milliLiter(s) (80 mL/Hr) IV Continuous <Continuous>      RADIOLOGY & ADDITIONAL TESTS:    20 : Xray Chest 1 View- PORTABLE-Urgent (Xray Chest 1 View- PORTABLE-Urgent .) (20 @ 18:06) Small left base infiltrate.        MICROBIOLOGY DATA:    Urine Microscopic-Add On (NC) (20 @ 14:00)   Red Blood Cell - Urine: 5-10 /HPF   White Blood Cell - Urine: 6-10 /HPF   Bacteria: Few /HPF   Epithelial Cells: Few /HPF     Culture - Urine (20 @ 02:01)   - Amikacin: S <=16   - Amoxicillin/Clavulanic Acid: S <=8/4   - Ampicillin: S <=8 These ampicillin results predict results for amoxicillin   - Ampicillin/Sulbactam: S <=4/2 Enterobacter, Citrobacter, and Serratia may develop resistance during prolonged therapy (3-4 days)   - Aztreonam: S <=4   - Cefazolin: S <=2 (MIC_CL_[id number]) For uncomplicated UTI with K. pneumoniae, E. coli, or P. mirablis: YULY <=16 is sensitive and YULY >=32 is resistant. This also predicts results for oral agents cefaclor, cefdinir, cefpodoxime, cefprozil, cefuroxime axetil, cephalexin and locarbef for uncomplicated UTI. Note that some isolates may be susceptible to these agents while testing resistant to cefazolin.   - Cefepime: S <=2   - Cefoxitin: S <=8   - Ceftriaxone: S <=1 Enterobacter, Citrobacter, and Serratia may develop resistance during prolonged therapy   - Ciprofloxacin: S <=0.25   - Ertapenem: S <=0.5   - Gentamicin: S <=2   - Imipenem: S <=1   - Levofloxacin: S <=0.5   - Meropenem: S <=1   - Nitrofurantoin: S <=32 Should not be used to treat pyelonephritis   - Piperacillin/Tazobactam: S <=8   - Tigecycline: S <=2   - Tobramycin: S <=2   - Trimethoprim/Sulfamethoxazole: S <=0.5/9.5   Specimen Source: .Urine Clean Catch (Midstream)   Culture Results:   >100,000 CFU/ml Escherichia coli   Culture - Urine (20 @ 02:01)   Specimen Source: .Urine Clean Catch (Midstream)   Culture Results: >100,000 CFU/ml Escherichia coli     Culture - Blood (20 @ 22:16)   Specimen Source: .Blood Blood   Culture Results:  No growth to date.     Culture - Blood (20 @ 22:16)   Specimen Source: .Blood Blood   Culture Results: No growth to date.     Respiratory Viral Panel with COVID-19 by VIDHI (20 @ 16:46)   Rapid RVP Result: Rachel   SARS-CoV-2: NotDetec:

## 2020-12-05 NOTE — PROGRESS NOTE ADULT - ATTENDING COMMENTS
Psych consult noted and much appreciated. Pt deemed without capacity regarding d/c planning given limited insight in the past into her hoarding problem - being a fire hazard. Pt has been AAOx3 and very polite and cooperative with good insight now into her problem of hoarding, agreeing "it absolutely needs to be taken care of". However pt's long standing close friend (Linda Enciso @[phone]), which pt herself is willing to appoint as her HCP, has contacted me and SW and is aware of pt's home setting (confounded with the hoarding situation), making it unsafe for pt's habitation. Pt at present has no next of kin aside from estranged nieces / nephews in [place]. Pt herself prefers her friend Linda to be her healthcare proxy.     SW is aware of this, it is hoped that the home setting situation can be remedied through her HCP (to APS's satisfaction) so that pt can be d/c to the home setting under the HCP's guardianship / supervision, in concurrence to pt's own wishes.     I had a conference call today by pt's bedside with her HCP on speaker, she is now working with the [employer] to have the apartment cleaned of all the overwhelming paper, books and journals.    12/3/20 - Awaiting scheduled cleaning crew for tomorrow to clean pt's apartment.    12/4/20- Pt's HCP has been chosen by pt to also be her potential guardian (and financial power of ) so that the HCP can now also excersize any financial decisions on patients behalf, whereby before she could not have done. As one home care agency was not willing to take on pt given lack of a financial garunteer.    12/5/20 -Awaiting word from SW/CW and HCP on attempt at remedying pt's home conditions for a safe d/c planning.

## 2020-12-05 NOTE — PROGRESS NOTE ADULT - ASSESSMENT
Patient is a 85y old  Female from home with PMH of HTN, spinal stenosis, scoliosis presents to ER for evaluation of  lower abdominal pain and dysuria and increased  urinary frequency. On admission, she found to have fever, tachycardia, Leukocytosis and positive Urine analysis. The CXR shows Small left base infiltrate. She has started on Ceftriaxone and the Id consult requested to assist with further evaluation and antibiotic management.     # Sepsis ( Fever + tachycardia+ Leukocytosis)- The Blood cultures have no growth to date   # UTI - urine culture grew E. coli.  # Possible pneumonia- on CXR  # Urinary retention - > 700 cc on bladder scan- 11/23- s/p removal of alfonso catheter, 11/25    would recommend:    1. PT/OOB to chair  2. Continue Cipro until 12/6/20  3. Monitor kidney function while on ABx    d/w patient     Attending Attestation:    Spent more than 35 minutes on total encounter, more than 50 % of the visit was spent counseling and/or coordinating care by the Attending physician.

## 2020-12-05 NOTE — PROGRESS NOTE ADULT - SUBJECTIVE AND OBJECTIVE BOX
CHIEF COMPLAINT:Patient is a 85y old  Female who presents with a chief complaint of abdominal pain and urinary symptoms.Pt appears comfortable.    	  REVIEW OF SYSTEMS:  CONSTITUTIONAL: No fever, weight loss, or fatigue  EYES: No eye pain, visual disturbances, or discharge  ENT:  No difficulty hearing, tinnitus, vertigo; No sinus or throat pain  NECK: No pain or stiffness  RESPIRATORY: No cough, wheezing, chills or hemoptysis; No Shortness of Breath  CARDIOVASCULAR: No chest pain, palpitations, passing out, dizziness, or leg swelling  GASTROINTESTINAL: No abdominal or epigastric pain. No nausea, vomiting, or hematemesis; No diarrhea or constipation. No melena or hematochezia.  GENITOURINARY: No dysuria, frequency, hematuria, or incontinence  NEUROLOGICAL: No headaches, memory loss, loss of strength, numbness, or tremors  SKIN: No itching, burning, rashes, or lesions   LYMPH Nodes: No enlarged glands  ENDOCRINE: No heat or cold intolerance; No hair loss  MUSCULOSKELETAL: No joint pain or swelling; No muscle, back, or extremity pain  PSYCHIATRIC: No depression, anxiety, mood swings, or difficulty sleeping  HEME/LYMPH: No easy bruising, or bleeding gums  ALLERGY AND IMMUNOLOGIC: No hives or eczema	      PHYSICAL EXAM:  T(C): 36.8 (12-05-20 @ 05:28), Max: 36.9 (12-04-20 @ 21:33)  HR: 72 (12-05-20 @ 05:28) (72 - 84)  BP: 114/69 (12-05-20 @ 05:28) (114/69 - 117/54)  RR: 18 (12-05-20 @ 05:28) (18 - 18)  SpO2: 96% (12-05-20 @ 05:28) (96% - 96%)    I&O's Summary    04 Dec 2020 07:01  -  05 Dec 2020 07:00  --------------------------------------------------------  IN: 240 mL / OUT: 402 mL / NET: -162 mL        Appearance: Normal	  HEENT:   Normal oral mucosa, PERRL, EOMI	  Lymphatic: No lymphadenopathy  Cardiovascular: Normal S1 S2, No JVD, No murmurs, No edema  Respiratory: Lungs clear to auscultation	  Psychiatry: A & O x 3, Mood & affect appropriate  Gastrointestinal:  Soft, Non-tender, + BS	  Skin: No rashes, No ecchymoses, No cyanosis	  Neurologic: Non-focal  Extremities: Normal range of motion, No clubbing, cyanosis or edema  Vascular: Peripheral pulses palpable 2+ bilaterally    MEDICATIONS  (STANDING):  apixaban 2.5 milliGRAM(s) Oral every 12 hours  ciprofloxacin     Tablet 500 milliGRAM(s) Oral every 12 hours  digoxin     Tablet 0.125 milliGRAM(s) Oral daily  metoprolol tartrate 25 milliGRAM(s) Oral two times a day  nystatin Ointment 1 Application(s) Topical two times a day  pantoprazole    Tablet 40 milliGRAM(s) Oral before breakfast  sodium chloride 0.9%. 1000 milliLiter(s) (80 mL/Hr) IV Continuous <Continuous>      LABS:	 	                        11.7   9.29  )-----------( 296      ( 04 Dec 2020 06:20 )             36.4     12-04    140  |  105  |  36<H>  ----------------------------<  83  4.0   |  30  |  0.90    Ca    9.3      04 Dec 2020 06:20        Lipid Profile: Cholesterol 115  LDL --  HDL 55  TG 71    TSH: Thyroid Stimulating Hormone, Serum: 1.90 uU/mL (11-23 @ 06:02)

## 2020-12-05 NOTE — PROGRESS NOTE ADULT - ASSESSMENT
85 yr F from home with PMH of HTN, spinal stenosis, scoliosis presents to ED with complaints of  lower abdominal pain and burning with urination since 4 days,UTI,PAF with RVR.  1.[facility] now pt refusing to go.SW f/u noted, plan for 24/7 home care when home inspected.  2.UTI-ABX until 12/6/20.  3.PAF-eliquis,lopressor,dig .125mg qd.  4.PPI.

## 2020-12-05 NOTE — PROGRESS NOTE ADULT - ASSESSMENT
85 yr F from home with PMH of HTN, spinal stenosis, scoliosis presents to ED with complaints of  lower abdominal pain and burning with urination since 4 days. Pt reports increased urge to urinate and burning with urination beginning four days ago. Pt also complains dull lower abdominal pain when she urinates, 6/10 in severity and non-radiating. 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 Pt denies blood in urine, diarrhea, vomiting, chest pain, difficulty breathing, fevers, or recent sick contacts.    Pt with positive UA admitted for Sepsis due to UTI, found to have E. Coli on urine Cx,  blood Cx NTD.  Pt. followed by ID Dr. Pulido, on Ceftriaxone D3/7, reports great improvement in symptomatology.  Pt. was also found to have urinary retention on admission, required alfonso placement, alfonso removed in am, voiding with no difficulty.  Pt. noted with A-Fib/Flutter on tele on  rate up to 140bpm, asymptomatic.  Currently NSR on Digoxin, Lopressor and Eliquis, Card Dr. Son.  No further episodes of AF on tele.  ECHO normal with EF 55-60%.  Pt. is now agreeable to discharge to [facility] per PT recommendation.  CM trying to get her to [place], likely Friday.    -Pt. changed her mind about [facility] stating her   there and she doesnt want to be the next to die.  Pt. with unsafe home environment, has a do not discharge order per APS due to extreme hoarding situation.  Pt. awaiting psych eval to determine capacity as she does not allow access to her home for APS clean up, d/w attending.  Pt. is clinically stable for discharge.

## 2020-12-05 NOTE — PROGRESS NOTE ADULT - SUBJECTIVE AND OBJECTIVE BOX
JOSE GARDNER  MR# [id number]  85yFemale        Patient is a 85y old  Female who presents with a chief complaint of abdominal pain and urinary symptoms (05 Dec 2020 11:36)      INTERVAL HPI/OVERNIGHT EVENTS:  Patient seen and examined at bedside. No notations of chest pain, palpitation, SOB, orthopnea, nausea, vomiting or abdominal pain.    ALLERGIES  No Known Allergies      MEDICATIONS  acetaminophen   Tablet .. 650 milliGRAM(s) Oral every 6 hours PRN Temp greater or equal to 38C (100.4F)  acetaminophen   Tablet .. 650 milliGRAM(s) Oral every 6 hours PRN Mild Pain (1 - 3), Moderate Pain (4 - 6)  apixaban 2.5 milliGRAM(s) Oral every 12 hours  ciprofloxacin     Tablet 500 milliGRAM(s) Oral every 12 hours  digoxin     Tablet 0.125 milliGRAM(s) Oral daily  hydrOXYzine hydrochloride 25 milliGRAM(s) Oral every 8 hours PRN sleep/anxiety  metoprolol tartrate 25 milliGRAM(s) Oral two times a day  nystatin Ointment 1 Application(s) Topical two times a day  pantoprazole    Tablet 40 milliGRAM(s) Oral before breakfast  sodium chloride 0.9%. 1000 milliLiter(s) IV Continuous <Continuous>              REVIEW OF SYSTEMS:  CONSTITUTIONAL: No fever, weight loss, or fatigue  EYES: No eye pain, visual disturbances, or discharge  ENT:  No difficulty hearing, tinnitus, vertigo; No sinus or throat pain  NECK: No pain or stiffness  RESPIRATORY: No cough, wheezing, chills or hemoptysis; No Shortness of Breath  CARDIOVASCULAR: No chest pain, palpitations, passing out, dizziness, or leg swelling  GASTROINTESTINAL: No abdominal or epigastric pain. No nausea, vomiting, or hematemesis; No diarrhea or constipation. No melena or hematochezia.  GENITOURINARY: No dysuria, frequency, hematuria, or incontinence  NEUROLOGICAL: No headaches, memory loss, loss of strength, numbness, or tremors  SKIN: No itching, burning, rashes, or lesions   LYMPH Nodes: No enlarged glands  ENDOCRINE: No heat or cold intolerance; No hair loss  MUSCULOSKELETAL: No joint pain or swelling; No muscle, back, or extremity pain  PSYCHIATRIC: No depression, anxiety, mood swings, or difficulty sleeping  HEME/LYMPH: No easy bruising, or bleeding gums  ALLERGY AND IMMUNOLOGIC: No hives or eczema	    [ ] All others negative	  [ ] Unable to obtain      T(C): 36.6 (12-05-20 @ 14:20), Max: 36.9 (12-04-20 @ 21:33)  T(F): 97.8 (12-05-20 @ 14:20), Max: 98.5 (12-04-20 @ 21:33)  HR: 80 (12-05-20 @ 18:00) (72 - 84)  BP: 132/60 (12-05-20 @ 18:00) (114/69 - 132/60)  RR: 17 (12-05-20 @ 14:20) (17 - 18)  SpO2: 95% (12-05-20 @ 14:20) (95% - 96%)  Wt(kg): --    I&O's Summary    04 Dec 2020 07:01  -  05 Dec 2020 07:00  --------------------------------------------------------  IN: 240 mL / OUT: 402 mL / NET: -162 mL          PHYSICAL EXAM:  A X O x  HEAD:  Atraumatic, Normocephalic  EYES: EOMI, PERRLA, conjunctiva and sclera clear  NECK: Supple, No JVD, Normal thyroid  Resp: CTAB, No crackles, wheezing,   CVS: Regular rate and rhythm; No discernable murmurs, rubs, or gallops  ABD: Soft, Nontender, Nondistended; Bowel sounds present  EXTREMITIES:  2+ Peripheral Pulses, No edema  LYMPH: No dicernable lymphadenopathy noted  GENERAL: NAD, well-groomed, well-developed      LABS:                        11.7   9.29  )-----------( 296      ( 04 Dec 2020 06:20 )             36.4     12-04    140  |  105  |  36<H>  ----------------------------<  83  4.0   |  30  |  0.90    Ca    9.3      04 Dec 2020 06:20          CAPILLARY BLOOD GLUCOSE          Troponins:  ProBNP:  Lipid Profile:   HgA1c:  TSH:           RADIOLOGY & ADDITIONAL TESTS:    Imaging Personally Reviewed:  [ ] YES  [ ] NO      Consultant(s) Notes Reviewed:  [x ] YES  [ ] NO    Care Discussed with Consultants/Other Providers [ x] YES  [ ] NO          PAST MEDICAL & SURGICAL HISTORY:  Spinal stenosis, unspecified spinal region    Scoliosis, unspecified scoliosis type, unspecified spinal region    Cataract of both eyes, unspecified cataract type          Sepsis    No pertinent family history in first degree relatives    Handoff    MEWS Score    Spinal stenosis, unspecified spinal region    Scoliosis, unspecified scoliosis type, unspecified spinal region    Sepsis without acute organ dysfunction, due to unspecified organism    Anxiety    Adjustment disorder    Adjustment disorder with anxiety    Discharge planning issues    Hypoalbuminemia due to protein-calorie malnutrition    Urinary retention    Paroxysmal atrial fibrillation with RVR    HTN (hypertension)    Need for prophylactic measure    Sepsis secondary to UTI    Cataract of both eyes, unspecified cataract type    A ABDOMINAL PAIN    Urinary tract infection without hematuria, site unspecified    SysAdmin_VisitLink

## 2020-12-06 RX ADMIN — NYSTATIN CREAM 1 APPLICATION(S): 100000 CREAM TOPICAL at 18:28

## 2020-12-06 RX ADMIN — Medication 650 MILLIGRAM(S): at 00:40

## 2020-12-06 RX ADMIN — Medication 650 MILLIGRAM(S): at 23:40

## 2020-12-06 RX ADMIN — Medication 500 MILLIGRAM(S): at 18:28

## 2020-12-06 RX ADMIN — Medication 650 MILLIGRAM(S): at 22:41

## 2020-12-06 RX ADMIN — Medication 25 MILLIGRAM(S): at 06:13

## 2020-12-06 RX ADMIN — Medication 0.12 MILLIGRAM(S): at 06:13

## 2020-12-06 RX ADMIN — NYSTATIN CREAM 1 APPLICATION(S): 100000 CREAM TOPICAL at 06:13

## 2020-12-06 RX ADMIN — APIXABAN 2.5 MILLIGRAM(S): 2.5 TABLET, FILM COATED ORAL at 06:13

## 2020-12-06 RX ADMIN — PANTOPRAZOLE SODIUM 40 MILLIGRAM(S): 20 TABLET, DELAYED RELEASE ORAL at 06:13

## 2020-12-06 RX ADMIN — Medication 500 MILLIGRAM(S): at 06:13

## 2020-12-06 RX ADMIN — APIXABAN 2.5 MILLIGRAM(S): 2.5 TABLET, FILM COATED ORAL at 18:27

## 2020-12-06 RX ADMIN — Medication 25 MILLIGRAM(S): at 18:28

## 2020-12-06 NOTE — PROGRESS NOTE ADULT - SUBJECTIVE AND OBJECTIVE BOX
Patient is seen and examined at the bed side, remains afebrile. She mentioned feeling better.       REVIEW OF SYSTEMS: All other review systems are negative      ALLERGIES: No Known Allergies      Vital Signs Last 24 Hrs  T(C): 37.1 (06 Dec 2020 14:55), Max: 37.1 (06 Dec 2020 14:55)  T(F): 98.7 (06 Dec 2020 14:55), Max: 98.7 (06 Dec 2020 14:55)  HR: 77 (06 Dec 2020 14:55) (72 - 80)  BP: 118/76 (06 Dec 2020 14:55) (118/76 - 132/60)  BP(mean): --  RR: 17 (06 Dec 2020 14:55) (17 - 18)  SpO2: 96% (06 Dec 2020 14:55) (95% - 96%)      PHYSICAL EXAM:  GENERAL: Not in distress   CHEST/LUNG: Not using accessory muscles   HEART: s1 and s2 present  ABDOMEN: Lower abdominal tenderness resolved  EXTREMITIES: No pedal  edema  CNS: Awake and Alert        LABS: No new Labs                          11.7   9.29  )-----------( 296      ( 04 Dec 2020 06:20 )             36.4                         12.3   10.44 )-----------( 351      ( 01 Dec 2020 07:01 )             38.7                         13.0   11.06 )-----------( 367      ( 2020 07:44 )             39.8         12-04    140  |  105  |  36<H>  ----------------------------<  83  4.0   |  30  |  0.90    Ca    9.3      04 Dec 2020 06:20      12-02    138  |  104  |  30<H>  ----------------------------<  86  4.4   |  28  |  1.01    Ca    9.1      02 Dec 2020 07:48    TPro  5.8<L>  /  Alb  2.1<L>  /  TBili  0.6  /  DBili  x   /  AST  40  /  ALT  27  /  AlkPhos  70  11-23      Urinalysis Basic - ( 2020 16:09 )  Color: Yellow / Appearance: Slightly Turbid / S.015 / pH: x  Gluc: x / Ketone: Negative  / Bili: Negative / Urobili: Negative   Blood: x / Protein: 100 / Nitrite: Positive   Leuk Esterase: Moderate / RBC: 5-10 /HPF / WBC >50 /HPF   Sq Epi: x / Non Sq Epi: Few /HPF / Bacteria: Moderate /HPF        MEDICATIONS  (STANDING):    apixaban 2.5 milliGRAM(s) Oral every 12 hours  ciprofloxacin     Tablet 500 milliGRAM(s) Oral every 12 hours  digoxin     Tablet 0.125 milliGRAM(s) Oral daily  metoprolol tartrate 25 milliGRAM(s) Oral two times a day  nystatin Ointment 1 Application(s) Topical two times a day  pantoprazole    Tablet 40 milliGRAM(s) Oral before breakfast  sodium chloride 0.9%. 1000 milliLiter(s) (80 mL/Hr) IV Continuous <Continuous>      RADIOLOGY & ADDITIONAL TESTS:    20 : Xray Chest 1 View- PORTABLE-Urgent (Xray Chest 1 View- PORTABLE-Urgent .) (20 @ 18:06) Small left base infiltrate.        MICROBIOLOGY DATA:    Urine Microscopic-Add On (NC) (20 @ 14:00)   Red Blood Cell - Urine: 5-10 /HPF   White Blood Cell - Urine: 6-10 /HPF   Bacteria: Few /HPF   Epithelial Cells: Few /HPF     Culture - Urine (20 @ 02:01)   - Amikacin: S <=16   - Amoxicillin/Clavulanic Acid: S <=8/4   - Ampicillin: S <=8 These ampicillin results predict results for amoxicillin   - Ampicillin/Sulbactam: S <=4/2 Enterobacter, Citrobacter, and Serratia may develop resistance during prolonged therapy (3-4 days)   - Aztreonam: S <=4   - Cefazolin: S <=2 (MIC_CL_COM_ENTERIC_CEFAZU) For uncomplicated UTI with K. pneumoniae, E. coli, or P. mirablis: YULY <=16 is sensitive and YULY >=32 is resistant. This also predicts results for oral agents cefaclor, cefdinir, cefpodoxime, cefprozil, cefuroxime axetil, cephalexin and locarbef for uncomplicated UTI. Note that some isolates may be susceptible to these agents while testing resistant to cefazolin.   - Cefepime: S <=2   - Cefoxitin: S <=8   - Ceftriaxone: S <=1 Enterobacter, Citrobacter, and Serratia may develop resistance during prolonged therapy   - Ciprofloxacin: S <=0.25   - Ertapenem: S <=0.5   - Gentamicin: S <=2   - Imipenem: S <=1   - Levofloxacin: S <=0.5   - Meropenem: S <=1   - Nitrofurantoin: S <=32 Should not be used to treat pyelonephritis   - Piperacillin/Tazobactam: S <=8   - Tigecycline: S <=2   - Tobramycin: S <=2   - Trimethoprim/Sulfamethoxazole: S <=0.5/9.5   Specimen Source: .Urine Clean Catch (Midstream)   Culture Results:   >100,000 CFU/ml Escherichia coli   Culture - Urine (20 @ 02:01)   Specimen Source: .Urine Clean Catch (Midstream)   Culture Results: >100,000 CFU/ml Escherichia coli     Culture - Blood (20 @ 22:16)   Specimen Source: .Blood Blood   Culture Results:  No growth to date.     Culture - Blood (20 @ 22:16)   Specimen Source: .Blood Blood   Culture Results: No growth to date.     Respiratory Viral Panel with COVID-19 by VIDHI (20 @ 16:46)   Rapid RVP Result: Chelseatenavin   SARS-CoV-2: NotDetec:            Patient is seen and examined at the bed side, is afebrile. She continues feeling better.       REVIEW OF SYSTEMS: All other review systems are negative      ALLERGIES: No Known Allergies      Vital Signs Last 24 Hrs  T(C): 37.1 (06 Dec 2020 14:55), Max: 37.1 (06 Dec 2020 14:55)  T(F): 98.7 (06 Dec 2020 14:55), Max: 98.7 (06 Dec 2020 14:55)  HR: 77 (06 Dec 2020 14:55) (72 - 80)  BP: 118/76 (06 Dec 2020 14:55) (118/76 - 132/60)  BP(mean): --  RR: 17 (06 Dec 2020 14:55) (17 - 18)  SpO2: 96% (06 Dec 2020 14:55) (95% - 96%)      PHYSICAL EXAM:  GENERAL: Not in distress   CHEST/LUNG: Not using accessory muscles   HEART: s1 and s2 present  ABDOMEN: Lower abdominal tenderness resolved  EXTREMITIES: No pedal  edema  CNS: Awake and Alert        LABS: No new Labs                          11.7   9.29  )-----------( 296      ( 04 Dec 2020 06:20 )             36.4                         12.3   10.44 )-----------( 351      ( 01 Dec 2020 07:01 )             38.7                         13.0   11.06 )-----------( 367      ( 2020 07:44 )             39.8         12-04    140  |  105  |  36<H>  ----------------------------<  83  4.0   |  30  |  0.90    Ca    9.3      04 Dec 2020 06:20      12-02    138  |  104  |  30<H>  ----------------------------<  86  4.4   |  28  |  1.01    Ca    9.1      02 Dec 2020 07:48    TPro  5.8<L>  /  Alb  2.1<L>  /  TBili  0.6  /  DBili  x   /  AST  40  /  ALT  27  /  AlkPhos  70  11-23      Urinalysis Basic - ( 2020 16:09 )  Color: Yellow / Appearance: Slightly Turbid / S.015 / pH: x  Gluc: x / Ketone: Negative  / Bili: Negative / Urobili: Negative   Blood: x / Protein: 100 / Nitrite: Positive   Leuk Esterase: Moderate / RBC: 5-10 /HPF / WBC >50 /HPF   Sq Epi: x / Non Sq Epi: Few /HPF / Bacteria: Moderate /HPF        MEDICATIONS  (STANDING):    apixaban 2.5 milliGRAM(s) Oral every 12 hours  ciprofloxacin     Tablet 500 milliGRAM(s) Oral every 12 hours  digoxin     Tablet 0.125 milliGRAM(s) Oral daily  metoprolol tartrate 25 milliGRAM(s) Oral two times a day  nystatin Ointment 1 Application(s) Topical two times a day  pantoprazole    Tablet 40 milliGRAM(s) Oral before breakfast  sodium chloride 0.9%. 1000 milliLiter(s) (80 mL/Hr) IV Continuous <Continuous>      RADIOLOGY & ADDITIONAL TESTS:    20 : Xray Chest 1 View- PORTABLE-Urgent (Xray Chest 1 View- PORTABLE-Urgent .) (20 @ 18:06) Small left base infiltrate.        MICROBIOLOGY DATA:    Urine Microscopic-Add On (NC) (20 @ 14:00)   Red Blood Cell - Urine: 5-10 /HPF   White Blood Cell - Urine: 6-10 /HPF   Bacteria: Few /HPF   Epithelial Cells: Few /HPF     Culture - Urine (20 @ 02:01)   - Amikacin: S <=16   - Amoxicillin/Clavulanic Acid: S <=8/4   - Ampicillin: S <=8 These ampicillin results predict results for amoxicillin   - Ampicillin/Sulbactam: S <=4/2 Enterobacter, Citrobacter, and Serratia may develop resistance during prolonged therapy (3-4 days)   - Aztreonam: S <=4   - Cefazolin: S <=2 (MIC_CL_COM_ENTERIC_CEFAZU) For uncomplicated UTI with K. pneumoniae, E. coli, or P. mirablis: YULY <=16 is sensitive and YULY >=32 is resistant. This also predicts results for oral agents cefaclor, cefdinir, cefpodoxime, cefprozil, cefuroxime axetil, cephalexin and locarbef for uncomplicated UTI. Note that some isolates may be susceptible to these agents while testing resistant to cefazolin.   - Cefepime: S <=2   - Cefoxitin: S <=8   - Ceftriaxone: S <=1 Enterobacter, Citrobacter, and Serratia may develop resistance during prolonged therapy   - Ciprofloxacin: S <=0.25   - Ertapenem: S <=0.5   - Gentamicin: S <=2   - Imipenem: S <=1   - Levofloxacin: S <=0.5   - Meropenem: S <=1   - Nitrofurantoin: S <=32 Should not be used to treat pyelonephritis   - Piperacillin/Tazobactam: S <=8   - Tigecycline: S <=2   - Tobramycin: S <=2   - Trimethoprim/Sulfamethoxazole: S <=0.5/9.5   Specimen Source: .Urine Clean Catch (Midstream)   Culture Results:   >100,000 CFU/ml Escherichia coli   Culture - Urine (20 @ 02:01)   Specimen Source: .Urine Clean Catch (Midstream)   Culture Results: >100,000 CFU/ml Escherichia coli     Culture - Blood (20 @ 22:16)   Specimen Source: .Blood Blood   Culture Results:  No growth to date.     Culture - Blood (20 @ 22:16)   Specimen Source: .Blood Blood   Culture Results: No growth to date.     Respiratory Viral Panel with COVID-19 by VIDHI (20 @ 16:46)   Rapid RVP Result: Chelseatenavin   SARS-CoV-2: NotDetec:

## 2020-12-06 NOTE — PROGRESS NOTE ADULT - SUBJECTIVE AND OBJECTIVE BOX
CHIEF COMPLAINT:Patient is a 85y old  Female who presents with a chief complaint of abdominal pain and urinary symptoms .Pt appears comfortable,    	  REVIEW OF SYSTEMS:  CONSTITUTIONAL: No fever, weight loss, or fatigue  EYES: No eye pain, visual disturbances, or discharge  ENT:  No difficulty hearing, tinnitus, vertigo; No sinus or throat pain  NECK: No pain or stiffness  RESPIRATORY: No cough, wheezing, chills or hemoptysis; No Shortness of Breath  CARDIOVASCULAR: No chest pain, palpitations, passing out, dizziness, or leg swelling  GASTROINTESTINAL: No abdominal or epigastric pain. No nausea, vomiting, or hematemesis; No diarrhea or constipation. No melena or hematochezia.  GENITOURINARY: No dysuria, frequency, hematuria, or incontinence  NEUROLOGICAL: No headaches, memory loss, loss of strength, numbness, or tremors  SKIN: No itching, burning, rashes, or lesions   LYMPH Nodes: No enlarged glands  ENDOCRINE: No heat or cold intolerance; No hair loss  MUSCULOSKELETAL: No joint pain or swelling; No muscle, back, or extremity pain  PSYCHIATRIC: No depression, anxiety, mood swings, or difficulty sleeping  HEME/LYMPH: No easy bruising, or bleeding gums  ALLERGY AND IMMUNOLOGIC: No hives or eczema	        PHYSICAL EXAM:  T(C): 37 (12-06-20 @ 05:32), Max: 37 (12-05-20 @ 21:29)  HR: 72 (12-06-20 @ 05:32) (72 - 83)  BP: 120/57 (12-06-20 @ 05:32) (119/52 - 132/60)  RR: 18 (12-06-20 @ 05:32) (17 - 18)  SpO2: 95% (12-06-20 @ 05:32) (95% - 95%)      Appearance: Normal	  HEENT:   Normal oral mucosa, PERRL, EOMI	  Lymphatic: No lymphadenopathy  Cardiovascular: Normal S1 S2, No JVD, No murmurs, No edema  Respiratory: Lungs clear to auscultation	  Psychiatry: A & O x 3, Mood & affect appropriate  Gastrointestinal:  Soft, Non-tender, + BS	  Skin: No rashes, No ecchymoses, No cyanosis	  Neurologic: Non-focal  Extremities: Normal range of motion, No clubbing, cyanosis or edema  Vascular: Peripheral pulses palpable 2+ bilaterally    MEDICATIONS  (STANDING):  apixaban 2.5 milliGRAM(s) Oral every 12 hours  ciprofloxacin     Tablet 500 milliGRAM(s) Oral every 12 hours  digoxin     Tablet 0.125 milliGRAM(s) Oral daily  metoprolol tartrate 25 milliGRAM(s) Oral two times a day  nystatin Ointment 1 Application(s) Topical two times a day  pantoprazole    Tablet 40 milliGRAM(s) Oral before breakfast  sodium chloride 0.9%. 1000 milliLiter(s) (80 mL/Hr) IV Continuous <Continuous>      	  	  LABS:	 	      Lipid Profile: Cholesterol 115  LDL --  HDL 55  TG 71      TSH: Thyroid Stimulating Hormone, Serum: 1.90 uU/mL (11-23 @ 06:02)

## 2020-12-06 NOTE — PROGRESS NOTE ADULT - ASSESSMENT
85 yr F from home with PMH of HTN, spinal stenosis, scoliosis presents to ED with complaints of  lower abdominal pain and burning with urination since 4 days. Pt reports increased urge to urinate and burning with urination beginning four days ago. Pt also complains dull lower abdominal pain when she urinates, 6/10 in severity and non-radiating. 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 Pt denies blood in urine, diarrhea, vomiting, chest pain, difficulty breathing, fevers, or recent sick contacts.    Pt with positive UA admitted for Sepsis due to UTI, found to have E. Coli on urine Cx,  blood Cx NTD.  Pt. followed by ID Dr. Pulido, on Ceftriaxone D3/7, reports great improvement in symptomatology.  Pt. was also found to have urinary retention on admission, required alfonso placement, alfonso removed in am, voiding with no difficulty.  Pt. noted with A-Fib/Flutter on tele on  rate up to 140bpm, asymptomatic.  Currently NSR on Digoxin, Lopressor and Eliquis, Card Dr. Son.  No further episodes of AF on tele.  ECHO normal with EF 55-60%.  Pt. is now agreeable to discharge to [facility] per PT recommendation.  CM trying to get her to [place], likely Friday.    -Pt. changed her mind about [facility] stating her   there and she doesnt want to be the next to die.  Pt. with unsafe home environment, has a do not discharge order per APS due to extreme hoarding situation.  Pt. awaiting psych eval to determine capacity as she does not allow access to her home for APS clean up, d/w attending.  Pt. is clinically stable for discharge.

## 2020-12-06 NOTE — PROGRESS NOTE ADULT - ATTENDING COMMENTS
Psych consult noted and much appreciated. Pt deemed without capacity regarding d/c planning given limited insight in the past into her hoarding problem - being a fire hazard. Pt has been AAOx3 and very polite and cooperative with good insight now into her problem of hoarding, agreeing "it absolutely needs to be taken care of". However pt's long standing close friend (Linda Enciso @[phone]), which pt herself is willing to appoint as her HCP, has contacted me and SW and is aware of pt's home setting (confounded with the hoarding situation), making it unsafe for pt's habitation. Pt at present has no next of kin aside from estranged nieces / nephews in [place]. Pt herself prefers her friend Linda to be her healthcare proxy.     SW is aware of this, it is hoped that the home setting situation can be remedied through her HCP (to APS's satisfaction) so that pt can be d/c to the home setting under the HCP's guardianship / supervision, in concurrence to pt's own wishes.     I had a conference call today by pt's bedside with her HCP on speaker, she is now working with the [employer] to have the apartment cleaned of all the overwhelming paper, books and journals.    12/3/20 - Awaiting scheduled cleaning crew for tomorrow to clean pt's apartment.    12/4/20- Pt's HCP has been chosen by pt to also be her potential guardian (and financial power of ) so that the HCP can now also excersize any financial decisions on patients behalf, whereby before she could not have done. As one home care agency was not willing to take on pt given lack of a financial garunteer.    12/5/20 -Awaiting word from SW/CW and HCP on attempt at remedying pt's home conditions for a safe d/c planning.

## 2020-12-06 NOTE — PROGRESS NOTE ADULT - SUBJECTIVE AND OBJECTIVE BOX
JOSE GARDNER  MR# [id number]  85yFemale        Patient is a 85y old  Female who presents with a chief complaint of abdominal pain and urinary symptoms (06 Dec 2020 15:03)      INTERVAL HPI/OVERNIGHT EVENTS:  Patient seen and examined at bedside. No notations of chest pain, palpitation, SOB, orthopnea, nausea, vomiting or abdominal pain.    ALLERGIES  No Known Allergies      MEDICATIONS  acetaminophen   Tablet .. 650 milliGRAM(s) Oral every 6 hours PRN Temp greater or equal to 38C (100.4F)  acetaminophen   Tablet .. 650 milliGRAM(s) Oral every 6 hours PRN Mild Pain (1 - 3), Moderate Pain (4 - 6)  apixaban 2.5 milliGRAM(s) Oral every 12 hours  ciprofloxacin     Tablet 500 milliGRAM(s) Oral every 12 hours  digoxin     Tablet 0.125 milliGRAM(s) Oral daily  hydrOXYzine hydrochloride 25 milliGRAM(s) Oral every 8 hours PRN sleep/anxiety  metoprolol tartrate 25 milliGRAM(s) Oral two times a day  nystatin Ointment 1 Application(s) Topical two times a day  pantoprazole    Tablet 40 milliGRAM(s) Oral before breakfast  sodium chloride 0.9%. 1000 milliLiter(s) IV Continuous <Continuous>              REVIEW OF SYSTEMS:  CONSTITUTIONAL: No fever, weight loss, or fatigue  EYES: No eye pain, visual disturbances, or discharge  ENT:  No difficulty hearing, tinnitus, vertigo; No sinus or throat pain  NECK: No pain or stiffness  RESPIRATORY: No cough, wheezing, chills or hemoptysis; No Shortness of Breath  CARDIOVASCULAR: No chest pain, palpitations, passing out, dizziness, or leg swelling  GASTROINTESTINAL: No abdominal or epigastric pain. No nausea, vomiting, or hematemesis; No diarrhea or constipation. No melena or hematochezia.  GENITOURINARY: No dysuria, frequency, hematuria, or incontinence  NEUROLOGICAL: No headaches, memory loss, loss of strength, numbness, or tremors  SKIN: No itching, burning, rashes, or lesions   LYMPH Nodes: No enlarged glands  ENDOCRINE: No heat or cold intolerance; No hair loss  MUSCULOSKELETAL: No joint pain or swelling; No muscle, back, or extremity pain  PSYCHIATRIC: No depression, anxiety, mood swings, or difficulty sleeping  HEME/LYMPH: No easy bruising, or bleeding gums  ALLERGY AND IMMUNOLOGIC: No hives or eczema	    [ ] All others negative	  [ ] Unable to obtain      T(C): 37.1 (12-06-20 @ 14:55), Max: 37.1 (12-06-20 @ 14:55)  T(F): 98.7 (12-06-20 @ 14:55), Max: 98.7 (12-06-20 @ 14:55)  HR: 77 (12-06-20 @ 14:55) (72 - 80)  BP: 118/76 (12-06-20 @ 14:55) (118/76 - 132/60)  RR: 17 (12-06-20 @ 14:55) (17 - 18)  SpO2: 96% (12-06-20 @ 14:55) (95% - 96%)  Wt(kg): --    I&O's Summary        PHYSICAL EXAM:  A X O x  HEAD:  Atraumatic, Normocephalic  EYES: EOMI, PERRLA, conjunctiva and sclera clear  NECK: Supple, No JVD, Normal thyroid  Resp: CTAB, No crackles, wheezing,   CVS: Regular rate and rhythm; No discernable murmurs, rubs, or gallops  ABD: Soft, Nontender, Nondistended; Bowel sounds present  EXTREMITIES:  2+ Peripheral Pulses, No edema  LYMPH: No dicernable lymphadenopathy noted  GENERAL: NAD, well-groomed, well-developed      LABS:              CAPILLARY BLOOD GLUCOSE          Troponins:  ProBNP:  Lipid Profile:   HgA1c:  TSH:           RADIOLOGY & ADDITIONAL TESTS:    Imaging Personally Reviewed:  [ ] YES  [ ] NO      Consultant(s) Notes Reviewed:  [x ] YES  [ ] NO    Care Discussed with Consultants/Other Providers [ x] YES  [ ] NO          PAST MEDICAL & SURGICAL HISTORY:  Spinal stenosis, unspecified spinal region    Scoliosis, unspecified scoliosis type, unspecified spinal region    Cataract of both eyes, unspecified cataract type          Sepsis    No pertinent family history in first degree relatives    Handoff    MEWS Score    Spinal stenosis, unspecified spinal region    Scoliosis, unspecified scoliosis type, unspecified spinal region    Sepsis without acute organ dysfunction, due to unspecified organism    Anxiety    Adjustment disorder    Adjustment disorder with anxiety    Discharge planning issues    Hypoalbuminemia due to protein-calorie malnutrition    Urinary retention    Paroxysmal atrial fibrillation with RVR    HTN (hypertension)    Need for prophylactic measure    Sepsis secondary to UTI    Cataract of both eyes, unspecified cataract type    A ABDOMINAL PAIN    Urinary tract infection without hematuria, site unspecified    SysAdmin_VisitLink

## 2020-12-06 NOTE — PROGRESS NOTE ADULT - ASSESSMENT
Patient is a 85y old  Female from home with PMH of HTN, spinal stenosis, scoliosis presents to ER for evaluation of  lower abdominal pain and dysuria and increased  urinary frequency. On admission, she found to have fever, tachycardia, Leukocytosis and positive Urine analysis. The CXR shows Small left base infiltrate. She has started on Ceftriaxone and the Id consult requested to assist with further evaluation and antibiotic management.     # Sepsis ( Fever + tachycardia+ Leukocytosis)- The Blood cultures have no growth to date   # UTI - urine culture grew E. coli.  # Possible pneumonia- on CXR  # Urinary retention - > 700 cc on bladder scan- 11/23- s/p removal of alfonso catheter, 11/25    would recommend:    1. PT/OOB to chair  2. Continue Cipro until 12/6/20  3. Monitor kidney function while on ABx    d/w patient     Attending Attestation:    Spent more than 35 minutes on total encounter, more than 50 % of the visit was spent counseling and/or coordinating care by the Attending physician.     Patient is a 85y old  Female from home with PMH of HTN, spinal stenosis, scoliosis presents to ER for evaluation of  lower abdominal pain and dysuria and increased  urinary frequency. On admission, she found to have fever, tachycardia, Leukocytosis and positive Urine analysis. The CXR shows Small left base infiltrate. She has started on Ceftriaxone and the Id consult requested to assist with further evaluation and antibiotic management.     # Sepsis ( Fever + tachycardia+ Leukocytosis)- The Blood cultures have no growth to date   # UTI - urine culture grew E. coli.  # Possible pneumonia- on CXR  # Urinary retention - > 700 cc on bladder scan- 11/23- s/p removal of alfonso catheter, 11/25    would recommend:    1. PT/OOB to chair  2. Continue Cipro until  today, 12/6/20  3. Monitor kidney function while on ABx    d/w patient     Attending Attestation:    Spent more than 35 minutes on total encounter, more than 50 % of the visit was spent counseling and/or coordinating care by the Attending physician.

## 2020-12-07 ENCOUNTER — TRANSCRIPTION ENCOUNTER (OUTPATIENT)
Age: 85
End: 2020-12-07

## 2020-12-07 DIAGNOSIS — Z71.89 OTHER SPECIFIED COUNSELING: ICD-10-CM

## 2020-12-07 DIAGNOSIS — N39.0 URINARY TRACT INFECTION, SITE NOT SPECIFIED: ICD-10-CM

## 2020-12-07 RX ORDER — CIPROFLOXACIN LACTATE 400MG/40ML
500 VIAL (ML) INTRAVENOUS EVERY 12 HOURS
Refills: 0 | Status: DISCONTINUED | OUTPATIENT
Start: 2020-12-07 | End: 2020-12-10

## 2020-12-07 RX ADMIN — Medication 500 MILLIGRAM(S): at 05:20

## 2020-12-07 RX ADMIN — NYSTATIN CREAM 1 APPLICATION(S): 100000 CREAM TOPICAL at 17:30

## 2020-12-07 RX ADMIN — Medication 25 MILLIGRAM(S): at 05:20

## 2020-12-07 RX ADMIN — APIXABAN 2.5 MILLIGRAM(S): 2.5 TABLET, FILM COATED ORAL at 05:20

## 2020-12-07 RX ADMIN — Medication 500 MILLIGRAM(S): at 21:51

## 2020-12-07 RX ADMIN — APIXABAN 2.5 MILLIGRAM(S): 2.5 TABLET, FILM COATED ORAL at 17:29

## 2020-12-07 RX ADMIN — Medication 0.12 MILLIGRAM(S): at 05:20

## 2020-12-07 RX ADMIN — Medication 25 MILLIGRAM(S): at 17:29

## 2020-12-07 RX ADMIN — NYSTATIN CREAM 1 APPLICATION(S): 100000 CREAM TOPICAL at 05:21

## 2020-12-07 RX ADMIN — Medication 650 MILLIGRAM(S): at 22:40

## 2020-12-07 RX ADMIN — Medication 650 MILLIGRAM(S): at 21:51

## 2020-12-07 RX ADMIN — PANTOPRAZOLE SODIUM 40 MILLIGRAM(S): 20 TABLET, DELAYED RELEASE ORAL at 05:20

## 2020-12-07 NOTE — PROGRESS NOTE ADULT - ASSESSMENT
85 yr F from home with PMH of HTN, spinal stenosis, scoliosis presents admitted for sepsis secondary to UTI. Urine cultures with E. Coli, treated with antibiotics. Also found to have urinary retention, alfonso catheter now dcd and pt voiding freely. Course complicated by afib/flutter on tele, cardiology consulted, anti arrhythmics adjusted, now resolved. ECHO normal with EF 55-60%. Pt unable to be discharged to home because she is a hoarder and at risk for being evicted. Psych consulted, pt does not have capacity in discharge planning.

## 2020-12-07 NOTE — DISCHARGE NOTE PROVIDER - CARE PROVIDER_API CALL
Steven Serrano)  Medicine  [address] [place] [address]  Phone: [phone]  Fax: [phone]  Established Patient  Follow Up Time:

## 2020-12-07 NOTE — PROGRESS NOTE ADULT - ASSESSMENT
85 yr F from home with PMH of HTN, spinal stenosis, scoliosis presents to ED with complaints of  lower abdominal pain and burning with urination since 4 days. Pt reports increased urge to urinate and burning with urination beginning four days ago. Pt also complains dull lower abdominal pain when she urinates, 6/10 in severity and non-radiating. 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 Pt denies blood in urine, diarrhea, vomiting, chest pain, difficulty breathing, fevers, or recent sick contacts.    Pt with positive UA admitted for Sepsis due to UTI, found to have E. Coli on urine Cx,  blood Cx NTD.  Pt. followed by ID Dr. Pulido, on Ceftriaxone D3/7, reports great improvement in symptomatology.  Pt. was also found to have urinary retention on admission, required alfonso placement, alfonso removed in am, voiding with no difficulty.  Pt. noted with A-Fib/Flutter on tele on  rate up to 140bpm, asymptomatic.  Currently NSR on Digoxin, Lopressor and Eliquis, Card Dr. Son.  No further episodes of AF on tele.  ECHO normal with EF 55-60%.  Pt. is now agreeable to discharge to [facility] per PT recommendation.  CM trying to get her to [place], likely Friday.    -Pt. changed her mind about [facility] stating her   there and she doesnt want to be the next to die.  Pt. with unsafe home environment, has a do not discharge order per APS due to extreme hoarding situation.  Pt. awaiting psych eval to determine capacity as she does not allow access to her home for APS clean up, d/w attending.  Pt. is clinically stable for discharge.

## 2020-12-07 NOTE — DISCHARGE NOTE PROVIDER - NSDCCPCAREPLAN_GEN_ALL_CORE_FT
PRINCIPAL DISCHARGE DIAGNOSIS  Diagnosis: Urinary tract infection without hematuria, site unspecified  Assessment and Plan of Treatment: You were trat      SECONDARY DISCHARGE DIAGNOSES  Diagnosis: Urinary tract infection without hematuria, site unspecified  Assessment and Plan of Treatment:      PRINCIPAL DISCHARGE DIAGNOSIS  Diagnosis: Urinary tract infection without hematuria, site unspecified  Assessment and Plan of Treatment: You presented with UTI and were treated with IV antibiotics. UTI has resolved. Always wipe from front to back after using the bathroom. Never wipe twice with the same tissue. Take showers and avoid prolonged baths. Try to empty the bladder at least every 4 hours during the day while awake, even if the need or urge to void is absent.  Do not try to hold your urine until a more convenient time or place.  Drink plenty of water.      SECONDARY DISCHARGE DIAGNOSES  Diagnosis: Paroxysmal atrial fibrillation with RVR  Assessment and Plan of Treatment: Atrial fibrillation is the most common heart rhythm problem.  The condition puts you at risk for has stroke and heart attack  It helps if you control your blood pressure, not drink more than 1-2 alcohol drinks per day, cut down on caffeine, getting treatment for over active thyroid gland, and get regular exercise  Call your doctor if you feel your heart racing or beating unusually, chest tightness or pain, lightheaded, faint, shortness of breath especially with exercise  It is important to take your heart medication as prescribed  You may be on anticoagulation which is very important to take as directed - you may need blood work to monitor drug levels    Diagnosis: Need for prophylactic measure  Assessment and Plan of Treatment: You were found to have Atrial Fibrilation. your aspirin and plavix were dscontinued and you were started on Apixaban.  Apixaban  is used to thin the blood so clots will not form and to keep existing ones from getting bigger.  Take this medication daily as prescribed by your health care provider.  Take this medication with food to prevent upset stomach.  If you miss a dose call your health care provider or pharmacist right away.  Tell your doctor you use this drug before you have a spinal or epidural procedure  Tell dentists, surgeon, and other doctors that you use this drug.  You may bleed more easily.  Be careful and avoid injury.  Use a soft toothbrush and an electric razor.    Diagnosis: HTN (hypertension)  Assessment and Plan of Treatment: Continue current blood pressure medication regimen as directed. Monitor for any visual changes, headaches or dizziness.  Monitor blood pressure regularly.  Follow up with your PCP for further management for high blood pressure, please call to make appointment within 1 week of discharge    Diagnosis: Urinary tract infection without hematuria, site unspecified  Assessment and Plan of Treatment:

## 2020-12-07 NOTE — PROGRESS NOTE ADULT - SUBJECTIVE AND OBJECTIVE BOX
CHIEF COMPLAINT:Patient is a 85y old  Female who presents with a chief complaint of abdominal pain and urinary symptoms.Pt appears comfortable.    	  REVIEW OF SYSTEMS:  CONSTITUTIONAL: No fever, weight loss, or fatigue  EYES: No eye pain, visual disturbances, or discharge  ENT:  No difficulty hearing, tinnitus, vertigo; No sinus or throat pain  NECK: No pain or stiffness  RESPIRATORY: No cough, wheezing, chills or hemoptysis; No Shortness of Breath  CARDIOVASCULAR: No chest pain, palpitations, passing out, dizziness, or leg swelling  GASTROINTESTINAL: No abdominal or epigastric pain. No nausea, vomiting, or hematemesis; No diarrhea or constipation. No melena or hematochezia.  GENITOURINARY: No dysuria, frequency, hematuria, or incontinence  NEUROLOGICAL: No headaches, memory loss, loss of strength, numbness, or tremors  SKIN: No itching, burning, rashes, or lesions   LYMPH Nodes: No enlarged glands  ENDOCRINE: No heat or cold intolerance; No hair loss  MUSCULOSKELETAL: No joint pain or swelling; No muscle, back, or extremity pain  PSYCHIATRIC: No depression, anxiety, mood swings, or difficulty sleeping  HEME/LYMPH: No easy bruising, or bleeding gums  ALLERGY AND IMMUNOLOGIC: No hives or eczema	    n    PHYSICAL EXAM:  T(C): 36.4 (12-07-20 @ 05:47), Max: 37.1 (12-06-20 @ 14:55)  HR: 77 (12-07-20 @ 05:47) (77 - 101)  BP: 123/60 (12-07-20 @ 05:47) (116/60 - 123/60)  RR: 16 (12-07-20 @ 05:47) (16 - 17)  SpO2: 96% (12-07-20 @ 05:47) (94% - 96%)      06 Dec 2020 07:01  -  07 Dec 2020 07:00  --------------------------------------------------------  IN: 0 mL / OUT: 500 mL / NET: -500 mL        Appearance: Normal	  HEENT:   Normal oral mucosa, PERRL, EOMI	  Lymphatic: No lymphadenopathy  Cardiovascular: Normal S1 S2, No JVD, No murmurs, No edema  Respiratory: Lungs clear to auscultation	  Psychiatry: A & O x 3, Mood & affect appropriate  Gastrointestinal:  Soft, Non-tender, + BS	  Skin: No rashes, No ecchymoses, No cyanosis	  Neurologic: Non-focal  Extremities: Normal range of motion, No clubbing, cyanosis or edema  Vascular: Peripheral pulses palpable 2+ bilaterally    MEDICATIONS  (STANDING):  apixaban 2.5 milliGRAM(s) Oral every 12 hours  digoxin     Tablet 0.125 milliGRAM(s) Oral daily  metoprolol tartrate 25 milliGRAM(s) Oral two times a day  nystatin Ointment 1 Application(s) Topical two times a day  pantoprazole    Tablet 40 milliGRAM(s) Oral before breakfast  sodium chloride 0.9%. 1000 milliLiter(s) (80 mL/Hr) IV Continuous <Continuous>      	  	  LABS:	 	      Lipid Profile: Cholesterol 115  LDL --  HDL 55  TG 71      TSH: Thyroid Stimulating Hormone, Serum: 1.90 uU/mL (11-23 @ 06:02)

## 2020-12-07 NOTE — DISCHARGE NOTE PROVIDER - NSDCMRMEDTOKEN_GEN_ALL_CORE_FT
amLODIPine 5 mg oral tablet: 1 tab(s) orally once a day  aspirin 81 mg oral tablet, chewable: 1 tab(s) orally once a day  Plavix 75 mg oral tablet: 1 tab(s) orally once a day   apixaban 2.5 mg oral tablet: 1 tab(s) orally every 12 hours  digoxin 125 mcg (0.125 mg) oral tablet: 1 tab(s) orally once a day  hydrOXYzine hydrochloride 25 mg oral tablet: 1 tab(s) orally every 8 hours, As needed, sleep/anxiety  metoprolol tartrate 25 mg oral tablet: 1 tab(s) orally 2 times a day

## 2020-12-07 NOTE — PROGRESS NOTE ADULT - SUBJECTIVE AND OBJECTIVE BOX
Patient is seen and examined at the bed side, is afebrile. She continues feeling better.       REVIEW OF SYSTEMS: All other review systems are negative      ALLERGIES: No Known Allergies      Vital Signs Last 24 Hrs  T(C): 36.4 (07 Dec 2020 15:02), Max: 36.4 (06 Dec 2020 21:26)  T(F): 97.5 (07 Dec 2020 15:02), Max: 97.6 (06 Dec 2020 21:26)  HR: 77 (07 Dec 2020 15:02) (77 - 101)  BP: 112/51 (07 Dec 2020 15:02) (112/51 - 123/60)  BP(mean): --  RR: 18 (07 Dec 2020 15:02) (16 - 18)  SpO2: 96% (07 Dec 2020 15:) (94% - 96%)      PHYSICAL EXAM:  GENERAL: Not in distress   CHEST/LUNG: Not using accessory muscles   HEART: s1 and s2 present  ABDOMEN: Lower abdominal tenderness resolved  EXTREMITIES: No pedal  edema  CNS: Awake and Alert        LABS: No new Labs                              11.7   9.29  )-----------( 296      ( 04 Dec 2020 06:20 )             36.4                         12.3   10.44 )-----------( 351      ( 01 Dec 2020 07:01 )             38.7                         13.0   11.06 )-----------( 367      ( 2020 07:44 )             39.8         12-04    140  |  105  |  36<H>  ----------------------------<  83  4.0   |  30  |  0.90    Ca    9.3      04 Dec 2020 06:20      12-02    138  |  104  |  30<H>  ----------------------------<  86  4.4   |  28  |  1.01    Ca    9.1      02 Dec 2020 07:48    TPro  5.8<L>  /  Alb  2.1<L>  /  TBili  0.6  /  DBili  x   /  AST  40  /  ALT  27  /  AlkPhos  70  11-23      Urinalysis Basic - ( 2020 16:09 )  Color: Yellow / Appearance: Slightly Turbid / S.015 / pH: x  Gluc: x / Ketone: Negative  / Bili: Negative / Urobili: Negative   Blood: x / Protein: 100 / Nitrite: Positive   Leuk Esterase: Moderate / RBC: 5-10 /HPF / WBC >50 /HPF   Sq Epi: x / Non Sq Epi: Few /HPF / Bacteria: Moderate /HPF        MEDICATIONS  (STANDING):      apixaban 2.5 milliGRAM(s) Oral every 12 hours  digoxin     Tablet 0.125 milliGRAM(s) Oral daily  metoprolol tartrate 25 milliGRAM(s) Oral two times a day  nystatin Ointment 1 Application(s) Topical two times a day  pantoprazole    Tablet 40 milliGRAM(s) Oral before breakfast  sodium chloride 0.9%. 1000 milliLiter(s) (80 mL/Hr) IV Continuous <Continuous>        RADIOLOGY & ADDITIONAL TESTS:    20 : Xray Chest 1 View- PORTABLE-Urgent (Xray Chest 1 View- PORTABLE-Urgent .) (20 @ 18:06) Small left base infiltrate.        MICROBIOLOGY DATA:    Urine Microscopic-Add On (NC) (20 @ 14:00)   Red Blood Cell - Urine: 5-10 /HPF   White Blood Cell - Urine: 6-10 /HPF   Bacteria: Few /HPF   Epithelial Cells: Few /HPF     Culture - Urine (20 @ 02:01)   - Amikacin: S <=16   - Amoxicillin/Clavulanic Acid: S <=8/4   - Ampicillin: S <=8 These ampicillin results predict results for amoxicillin   - Ampicillin/Sulbactam: S <=4/2 Enterobacter, Citrobacter, and Serratia may develop resistance during prolonged therapy (3-4 days)   - Aztreonam: S <=4   - Cefazolin: S <=2 (MIC_CL_COM_ENTERIC_CEFAZU) For uncomplicated UTI with K. pneumoniae, E. coli, or P. mirablis: YULY <=16 is sensitive and YULY >=32 is resistant. This also predicts results for oral agents cefaclor, cefdinir, cefpodoxime, cefprozil, cefuroxime axetil, cephalexin and locarbef for uncomplicated UTI. Note that some isolates may be susceptible to these agents while testing resistant to cefazolin.   - Cefepime: S <=2   - Cefoxitin: S <=8   - Ceftriaxone: S <=1 Enterobacter, Citrobacter, and Serratia may develop resistance during prolonged therapy   - Ciprofloxacin: S <=0.25   - Ertapenem: S <=0.5   - Gentamicin: S <=2   - Imipenem: S <=1   - Levofloxacin: S <=0.5   - Meropenem: S <=1   - Nitrofurantoin: S <=32 Should not be used to treat pyelonephritis   - Piperacillin/Tazobactam: S <=8   - Tigecycline: S <=2   - Tobramycin: S <=2   - Trimethoprim/Sulfamethoxazole: S <=0.5/9.5   Specimen Source: .Urine Clean Catch (Midstream)   Culture Results:   >100,000 CFU/ml Escherichia coli   Culture - Urine (20 @ 02:01)   Specimen Source: .Urine Clean Catch (Midstream)   Culture Results: >100,000 CFU/ml Escherichia coli     Culture - Blood (20 @ 22:16)   Specimen Source: .Blood Blood   Culture Results:  No growth to date.     Culture - Blood (20 @ 22:16)   Specimen Source: .Blood Blood   Culture Results: No growth to date.     Respiratory Viral Panel with COVID-19 by VIDHI (20 @ 16:46)   Rapid RVP Result: Rachel   SARS-CoV-2: NotDetec:          Patient is seen and examined at the bed side, is afebrile. She continues feeling better.       REVIEW OF SYSTEMS: All other review systems are negative      ALLERGIES: No Known Allergies      Vital Signs Last 24 Hrs  T(C): 36.4 (07 Dec 2020 15:02), Max: 36.4 (06 Dec 2020 21:26)  T(F): 97.5 (07 Dec 2020 15:02), Max: 97.6 (06 Dec 2020 21:26)  HR: 77 (07 Dec 2020 15:02) (77 - 101)  BP: 112/51 (07 Dec 2020 15:02) (112/51 - 123/60)  BP(mean): --  RR: 18 (07 Dec 2020 15:02) (16 - 18)  SpO2: 96% (07 Dec 2020 15:) (94% - 96%)      PHYSICAL EXAM:  GENERAL: Not in distress   CHEST/LUNG: Not using accessory muscles   HEART: s1 and s2 present  ABDOMEN: Lower abdominal tenderness resolved  EXTREMITIES: No pedal  edema  CNS: Awake and Alert        LABS: No new Labs                        11.7   9.29  )-----------( 296      ( 04 Dec 2020 06:20 )             36.4                         12.3   10.44 )-----------( 351      ( 01 Dec 2020 07:01 )             38.7                         13.0   11.06 )-----------( 367      ( 2020 07:44 )             39.8         12-04    140  |  105  |  36<H>  ----------------------------<  83  4.0   |  30  |  0.90    Ca    9.3      04 Dec 2020 06:20      12-02    138  |  104  |  30<H>  ----------------------------<  86  4.4   |  28  |  1.01    Ca    9.1      02 Dec 2020 07:48    TPro  5.8<L>  /  Alb  2.1<L>  /  TBili  0.6  /  DBili  x   /  AST  40  /  ALT  27  /  AlkPhos  70  11-23      Urinalysis Basic - ( 2020 16:09 )  Color: Yellow / Appearance: Slightly Turbid / S.015 / pH: x  Gluc: x / Ketone: Negative  / Bili: Negative / Urobili: Negative   Blood: x / Protein: 100 / Nitrite: Positive   Leuk Esterase: Moderate / RBC: 5-10 /HPF / WBC >50 /HPF   Sq Epi: x / Non Sq Epi: Few /HPF / Bacteria: Moderate /HPF        MEDICATIONS  (STANDING):      apixaban 2.5 milliGRAM(s) Oral every 12 hours  digoxin     Tablet 0.125 milliGRAM(s) Oral daily  metoprolol tartrate 25 milliGRAM(s) Oral two times a day  nystatin Ointment 1 Application(s) Topical two times a day  pantoprazole    Tablet 40 milliGRAM(s) Oral before breakfast  sodium chloride 0.9%. 1000 milliLiter(s) (80 mL/Hr) IV Continuous <Continuous>        RADIOLOGY & ADDITIONAL TESTS:    20 : Xray Chest 1 View- PORTABLE-Urgent (Xray Chest 1 View- PORTABLE-Urgent .) (20 @ 18:06) Small left base infiltrate.        MICROBIOLOGY DATA:    Urine Microscopic-Add On (NC) (20 @ 14:00)   Red Blood Cell - Urine: 5-10 /HPF   White Blood Cell - Urine: 6-10 /HPF   Bacteria: Few /HPF   Epithelial Cells: Few /HPF     Culture - Urine (20 @ 02:01)   - Amikacin: S <=16   - Amoxicillin/Clavulanic Acid: S <=8/4   - Ampicillin: S <=8 These ampicillin results predict results for amoxicillin   - Ampicillin/Sulbactam: S <=4/2 Enterobacter, Citrobacter, and Serratia may develop resistance during prolonged therapy (3-4 days)   - Aztreonam: S <=4   - Cefazolin: S <=2 (MIC_CL_COM_ENTERIC_CEFAZU) For uncomplicated UTI with K. pneumoniae, E. coli, or P. mirablis: YULY <=16 is sensitive and YULY >=32 is resistant. This also predicts results for oral agents cefaclor, cefdinir, cefpodoxime, cefprozil, cefuroxime axetil, cephalexin and locarbef for uncomplicated UTI. Note that some isolates may be susceptible to these agents while testing resistant to cefazolin.   - Cefepime: S <=2   - Cefoxitin: S <=8   - Ceftriaxone: S <=1 Enterobacter, Citrobacter, and Serratia may develop resistance during prolonged therapy   - Ciprofloxacin: S <=0.25   - Ertapenem: S <=0.5   - Gentamicin: S <=2   - Imipenem: S <=1   - Levofloxacin: S <=0.5   - Meropenem: S <=1   - Nitrofurantoin: S <=32 Should not be used to treat pyelonephritis   - Piperacillin/Tazobactam: S <=8   - Tigecycline: S <=2   - Tobramycin: S <=2   - Trimethoprim/Sulfamethoxazole: S <=0.5/9.5   Specimen Source: .Urine Clean Catch (Midstream)   Culture Results:   >100,000 CFU/ml Escherichia coli   Culture - Urine (20 @ 02:01)   Specimen Source: .Urine Clean Catch (Midstream)   Culture Results: >100,000 CFU/ml Escherichia coli     Culture - Blood (20 @ 22:16)   Specimen Source: .Blood Blood   Culture Results:  No growth to date.     Culture - Blood (20 @ 22:16)   Specimen Source: .Blood Blood   Culture Results: No growth to date.     Respiratory Viral Panel with COVID-19 by VIDHI (20 @ 16:46)   Rapid RVP Result: Rachel   SARS-CoV-2: NotDetec:

## 2020-12-07 NOTE — PROGRESS NOTE ADULT - ATTENDING COMMENTS
Psych consult noted and much appreciated. Pt deemed without capacity regarding d/c planning given limited insight in the past into her hoarding problem - being a fire hazard. Pt has been AAOx3 and very polite and cooperative with good insight now into her problem of hoarding, agreeing "it absolutely needs to be taken care of". However pt's long standing close friend (Linda Enciso @[phone]), which pt herself is willing to appoint as her HCP, has contacted me and SW and is aware of pt's home setting (confounded with the hoarding situation), making it unsafe for pt's habitation. Pt at present has no next of kin aside from estranged nieces / nephews in [place]. Pt herself prefers her friend Linda to be her healthcare proxy.     SW is aware of this, it is hoped that the home setting situation can be remedied through her HCP (to APS's satisfaction) so that pt can be d/c to the home setting under the HCP's guardianship / supervision, in concurrence to pt's own wishes.     I had a conference call today by pt's bedside with her HCP on speaker, she is now working with the [employer] to have the apartment cleaned of all the overwhelming paper, books and journals.    12/3/20 - Awaiting scheduled cleaning crew for tomorrow to clean pt's apartment.    12/4/20- Pt's HCP has been chosen by pt to also be her potential guardian (and financial power of ) so that the HCP can now also excersize any financial decisions on patients behalf, whereby before she could not have done. As one home care agency was not willing to take on pt given lack of a financial garunteer.    12/7/20 -Awaiting word from SW/CW and HCP on attempt at remedying pt's home conditions for a safe d/c planning.

## 2020-12-07 NOTE — PROGRESS NOTE ADULT - ASSESSMENT
Patient is a 85y old  Female from home with PMH of HTN, spinal stenosis, scoliosis presents to ER for evaluation of  lower abdominal pain and dysuria and increased  urinary frequency. On admission, she found to have fever, tachycardia, Leukocytosis and positive Urine analysis. The CXR shows Small left base infiltrate. She has started on Ceftriaxone and the Id consult requested to assist with further evaluation and antibiotic management.     # Sepsis ( Fever + tachycardia+ Leukocytosis)- The Blood cultures have no growth to date   # UTI - urine culture grew E. coli.  # Possible pneumonia- on CXR  # Urinary retention - > 700 cc on bladder scan- 11/23- s/p removal of alfonso catheter, 11/25    would recommend:    1. PT/OOB to chair  2. Monitor  oFF ABxx  3. Monitor kidney function     d/w patient     Attending Attestation:    Spent more than 35 minutes on total encounter, more than 50 % of the visit was spent counseling and/or coordinating care by the Attending physician. Patient is a 85y old  Female from home with PMH of HTN, spinal stenosis, scoliosis presents to ER for evaluation of  lower abdominal pain and dysuria and increased  urinary frequency. On admission, she found to have fever, tachycardia, Leukocytosis and positive Urine analysis. The CXR shows Small left base infiltrate. She has started on Ceftriaxone and the Id consult requested to assist with further evaluation and antibiotic management.     # Sepsis ( Fever + tachycardia+ Leukocytosis)- The Blood cultures have no growth to date   # UTI - urine culture grew E. coli.  # Possible pneumonia- on CXR  # Urinary retention - > 700 cc on bladder scan- 11/23- s/p removal of alfonso catheter, 11/25    would recommend:    1. PT/OOB to chair  2. Monitor kidney function     d/w patient  and Nursing staff    Attending Attestation:    Spent more than 35 minutes on total encounter, more than 50 % of the visit was spent counseling and/or coordinating care by the Attending physician.

## 2020-12-07 NOTE — PROGRESS NOTE ADULT - SUBJECTIVE AND OBJECTIVE BOX
NP Note discussed with  Primary Attending    Patient is a 85y old  Female who presents with a chief complaint of abdominal pain and urinary symptoms (07 Dec 2020 09:17)      INTERVAL HPI/OVERNIGHT EVENTS: no new complaints    MEDICATIONS  (STANDING):  apixaban 2.5 milliGRAM(s) Oral every 12 hours  digoxin     Tablet 0.125 milliGRAM(s) Oral daily  metoprolol tartrate 25 milliGRAM(s) Oral two times a day  nystatin Ointment 1 Application(s) Topical two times a day  pantoprazole    Tablet 40 milliGRAM(s) Oral before breakfast  sodium chloride 0.9%. 1000 milliLiter(s) (80 mL/Hr) IV Continuous <Continuous>    MEDICATIONS  (PRN):  acetaminophen   Tablet .. 650 milliGRAM(s) Oral every 6 hours PRN Temp greater or equal to 38C (100.4F)  acetaminophen   Tablet .. 650 milliGRAM(s) Oral every 6 hours PRN Mild Pain (1 - 3), Moderate Pain (4 - 6)  hydrOXYzine hydrochloride 25 milliGRAM(s) Oral every 8 hours PRN sleep/anxiety      __________________________________________________  REVIEW OF SYSTEMS:    CONSTITUTIONAL: No fever,   EYES: no acute visual disturbances  NECK: No pain or stiffness  RESPIRATORY: No cough; No shortness of breath  CARDIOVASCULAR: No chest pain, no palpitations  GASTROINTESTINAL: No pain. No nausea or vomiting; No diarrhea   NEUROLOGICAL: No headache or numbness, no tremors  MUSCULOSKELETAL: No joint pain, no muscle pain  GENITOURINARY: no dysuria, no frequency, no hesitancy  PSYCHIATRY: no depression , no anxiety  ALL OTHER  ROS negative        Vital Signs Last 24 Hrs  T(C): 36.4 (07 Dec 2020 05:47), Max: 37.1 (06 Dec 2020 14:55)  T(F): 97.6 (07 Dec 2020 05:47), Max: 98.7 (06 Dec 2020 14:55)  HR: 77 (07 Dec 2020 05:47) (77 - 101)  BP: 123/60 (07 Dec 2020 05:47) (116/60 - 123/60)  BP(mean): --  RR: 16 (07 Dec 2020 05:47) (16 - 17)  SpO2: 96% (07 Dec 2020 05:47) (94% - 96%)    ________________________________________________  PHYSICAL EXAM:  GENERAL: NAD  HEENT: Normocephalic;  conjunctivae and sclerae clear; moist mucous membranes;   NECK : supple  CHEST/LUNG: Clear to auscultation bilaterally with good air entry   HEART: S1 S2  regular; no murmurs, gallops or rubs  ABDOMEN: Soft, Nontender, Nondistended; Bowel sounds present  EXTREMITIES: no cyanosis; no edema; no calf tenderness  SKIN: warm and dry; no rash  NERVOUS SYSTEM:  Awake and alert; Oriented  to place, person and time ; no new deficits    _________________________________________________  LABS:      CAPILLARY BLOOD GLUCOSE    RADIOLOGY & ADDITIONAL TESTS:    Imaging  Reviewed:  YES    Consultant(s) Notes Reviewed:   YES      Plan of care was discussed with patient and /or primary care giver; all questions and concerns were addressed

## 2020-12-07 NOTE — DISCHARGE NOTE PROVIDER - HOSPITAL COURSE
85 yr F from home with PMH of HTN, spinal stenosis, scoliosis presents admitted for sepsis secondary to UTI. Urine cultures with E. Coli, treated with antibiotics. Also found to have urinary retention, alfonso catheter now dcd and pt voiding freely. Course complicated by afib/flutter on tele, cardiology consulted, anti arrhythmics adjusted, now resolved. ECHO normal with EF 55-60%. Pt unable to be discharged to home because she is a hoarder and at risk for being evicted. Psych consulted, pt does not have capacity in discharge planning.     INCOMPLETE 85 yr F from home with PMH of HTN, spinal stenosis, scoliosis presents admitted for sepsis secondary to UTI. Urine cultures with E. Coli, treated with antibiotics. Also found to have urinary retention, alfonso catheter now dcd and pt voiding freely. Course complicated by afib/flutter on tele, cardiology consulted, anti arrhythmics adjusted, now resolved. ECHO normal with EF 55-60%. Pt unable to be discharged to home because she is a hoarder and at risk for being evicted. Psych consulted, pt does not have capacity in discharge planning.  patient is stable for discharge asper attending physician

## 2020-12-08 LAB
ANION GAP SERPL CALC-SCNC: 10 MMOL/L — SIGNIFICANT CHANGE UP (ref 5–17)
BUN SERPL-MCNC: 27 MG/DL — HIGH (ref 7–18)
CALCIUM SERPL-MCNC: 9 MG/DL — SIGNIFICANT CHANGE UP (ref 8.4–10.5)
CHLORIDE SERPL-SCNC: 108 MMOL/L — SIGNIFICANT CHANGE UP (ref 96–108)
CO2 SERPL-SCNC: 25 MMOL/L — SIGNIFICANT CHANGE UP (ref 22–31)
CREAT SERPL-MCNC: 0.85 MG/DL — SIGNIFICANT CHANGE UP (ref 0.5–1.3)
GLUCOSE SERPL-MCNC: 84 MG/DL — SIGNIFICANT CHANGE UP (ref 70–99)
HCT VFR BLD CALC: 37.7 % — SIGNIFICANT CHANGE UP (ref 34.5–45)
HGB BLD-MCNC: 12 G/DL — SIGNIFICANT CHANGE UP (ref 11.5–15.5)
MCHC RBC-ENTMCNC: 31.1 PG — SIGNIFICANT CHANGE UP (ref 27–34)
MCHC RBC-ENTMCNC: 31.8 GM/DL — LOW (ref 32–36)
MCV RBC AUTO: 97.7 FL — SIGNIFICANT CHANGE UP (ref 80–100)
NRBC # BLD: 0 /100 WBCS — SIGNIFICANT CHANGE UP (ref 0–0)
PLATELET # BLD AUTO: 220 K/UL — SIGNIFICANT CHANGE UP (ref 150–400)
POTASSIUM SERPL-MCNC: 4.3 MMOL/L — SIGNIFICANT CHANGE UP (ref 3.5–5.3)
POTASSIUM SERPL-SCNC: 4.3 MMOL/L — SIGNIFICANT CHANGE UP (ref 3.5–5.3)
RBC # BLD: 3.86 M/UL — SIGNIFICANT CHANGE UP (ref 3.8–5.2)
RBC # FLD: 14.2 % — SIGNIFICANT CHANGE UP (ref 10.3–14.5)
SODIUM SERPL-SCNC: 143 MMOL/L — SIGNIFICANT CHANGE UP (ref 135–145)
WBC # BLD: 7.28 K/UL — SIGNIFICANT CHANGE UP (ref 3.8–10.5)
WBC # FLD AUTO: 7.28 K/UL — SIGNIFICANT CHANGE UP (ref 3.8–10.5)

## 2020-12-08 PROCEDURE — 99232 SBSQ HOSP IP/OBS MODERATE 35: CPT

## 2020-12-08 RX ADMIN — Medication 650 MILLIGRAM(S): at 22:49

## 2020-12-08 RX ADMIN — APIXABAN 2.5 MILLIGRAM(S): 2.5 TABLET, FILM COATED ORAL at 17:34

## 2020-12-08 RX ADMIN — NYSTATIN CREAM 1 APPLICATION(S): 100000 CREAM TOPICAL at 05:51

## 2020-12-08 RX ADMIN — Medication 0.12 MILLIGRAM(S): at 05:50

## 2020-12-08 RX ADMIN — Medication 650 MILLIGRAM(S): at 23:49

## 2020-12-08 RX ADMIN — Medication 25 MILLIGRAM(S): at 05:50

## 2020-12-08 RX ADMIN — NYSTATIN CREAM 1 APPLICATION(S): 100000 CREAM TOPICAL at 17:36

## 2020-12-08 RX ADMIN — APIXABAN 2.5 MILLIGRAM(S): 2.5 TABLET, FILM COATED ORAL at 05:50

## 2020-12-08 RX ADMIN — PANTOPRAZOLE SODIUM 40 MILLIGRAM(S): 20 TABLET, DELAYED RELEASE ORAL at 05:50

## 2020-12-08 RX ADMIN — Medication 500 MILLIGRAM(S): at 05:50

## 2020-12-08 RX ADMIN — Medication 25 MILLIGRAM(S): at 17:34

## 2020-12-08 RX ADMIN — Medication 500 MILLIGRAM(S): at 17:33

## 2020-12-08 NOTE — PROGRESS NOTE ADULT - SUBJECTIVE AND OBJECTIVE BOX
NP Note discussed with  Primary Attending    Patient is a 85y old  Female who presents with a chief complaint of abdominal pain and urinary symptoms (08 Dec 2020 09:38)      INTERVAL HPI/OVERNIGHT EVENTS: no new complaints    MEDICATIONS  (STANDING):  apixaban 2.5 milliGRAM(s) Oral every 12 hours  ciprofloxacin     Tablet 500 milliGRAM(s) Oral every 12 hours  digoxin     Tablet 0.125 milliGRAM(s) Oral daily  metoprolol tartrate 25 milliGRAM(s) Oral two times a day  nystatin Ointment 1 Application(s) Topical two times a day  pantoprazole    Tablet 40 milliGRAM(s) Oral before breakfast  sodium chloride 0.9%. 1000 milliLiter(s) (80 mL/Hr) IV Continuous <Continuous>    MEDICATIONS  (PRN):  acetaminophen   Tablet .. 650 milliGRAM(s) Oral every 6 hours PRN Temp greater or equal to 38C (100.4F)  acetaminophen   Tablet .. 650 milliGRAM(s) Oral every 6 hours PRN Mild Pain (1 - 3), Moderate Pain (4 - 6)  hydrOXYzine hydrochloride 25 milliGRAM(s) Oral every 8 hours PRN sleep/anxiety      __________________________________________________  REVIEW OF SYSTEMS:    CONSTITUTIONAL: No fever,   EYES: no acute visual disturbances  NECK: No pain or stiffness  RESPIRATORY: No cough; No shortness of breath  CARDIOVASCULAR: No chest pain, no palpitations  GASTROINTESTINAL: No pain. No nausea or vomiting; No diarrhea   NEUROLOGICAL: No headache or numbness, no tremors  MUSCULOSKELETAL: No joint pain, no muscle pain  GENITOURINARY: no dysuria, no frequency, no hesitancy  PSYCHIATRY: no depression , no anxiety  ALL OTHER  ROS negative        Vital Signs Last 24 Hrs  T(C): 36.4 (08 Dec 2020 05:29), Max: 36.6 (07 Dec 2020 21:44)  T(F): 97.5 (08 Dec 2020 05:29), Max: 97.8 (07 Dec 2020 21:44)  HR: 70 (08 Dec 2020 05:29) (70 - 78)  BP: 127/54 (08 Dec 2020 05:29) (112/51 - 127/54)  BP(mean): --  RR: 18 (08 Dec 2020 05:29) (18 - 18)  SpO2: 95% (08 Dec 2020 05:29) (95% - 96%)    ________________________________________________  PHYSICAL EXAM:  GENERAL: NAD  HEENT: Normocephalic;  conjunctivae and sclerae clear; moist mucous membranes;   NECK : supple  CHEST/LUNG: Clear to auscultation bilaterally with good air entry   HEART: S1 S2  regular; no murmurs, gallops or rubs  ABDOMEN: Soft, Nontender, Nondistended; Bowel sounds present  EXTREMITIES: no cyanosis; no edema; no calf tenderness  SKIN: warm and dry; no rash  NERVOUS SYSTEM:  Awake and alert; Oriented  to place, person and time ; no new deficits    _________________________________________________  LABS:                        12.0   7.28  )-----------( 220      ( 08 Dec 2020 06:18 )             37.7     12-08    143  |  108  |  27<H>  ----------------------------<  84  4.3   |  25  |  0.85    Ca    9.0      08 Dec 2020 06:18          CAPILLARY BLOOD GLUCOSE            RADIOLOGY & ADDITIONAL TESTS:    Imaging  Reviewed:  YES/NO    Consultant(s) Notes Reviewed:   YES/ No      Plan of care was discussed with patient and /or primary care giver; all questions and concerns were addressed

## 2020-12-08 NOTE — PROGRESS NOTE ADULT - SUBJECTIVE AND OBJECTIVE BOX
Patient is seen and examined at the bed side, is afebrile. She continues feeling better.       REVIEW OF SYSTEMS: All other review systems are negative      ALLERGIES: No Known Allergies      Vital Signs Last 24 Hrs  T(C): 36.6 (08 Dec 2020 13:47), Max: 36.6 (07 Dec 2020 21:44)  T(F): 97.9 (08 Dec 2020 13:47), Max: 97.9 (08 Dec 2020 13:47)  HR: 82 (08 Dec 2020 13:47) (70 - 82)  BP: 109/63 (08 Dec 2020 13:47) (109/63 - 127/54)  BP(mean): --  RR: 17 (08 Dec 2020 13:47) (17 - 18)  SpO2: 95% (08 Dec 2020 13:47) (95% - 95%)        PHYSICAL EXAM:  GENERAL: Not in distress   CHEST/LUNG: Not using accessory muscles   HEART: s1 and s2 present  ABDOMEN: Lower abdominal tenderness resolved  EXTREMITIES: No pedal  edema  CNS: Awake and Alert        LABS:                         12.0   7.28  )-----------( 220      ( 08 Dec 2020 06:18 )             37.7                12.3   10.44 )-----------( 351      ( 01 Dec 2020 07:01 )             38.7                         13.0   11.06 )-----------( 367      ( 2020 07:44 )             39.8       12-08    143  |  108  |  27<H>  ----------------------------<  84  4.3   |  25  |  0.85    Ca    9.0      08 Dec 2020 06:18      12-04    140  |  105  |  36<H>  ----------------------------<  83  4.0   |  30  |  0.90    Ca    9.3      04 Dec 2020 06:20    TPro  5.8<L>  /  Alb  2.1<L>  /  TBili  0.6  /  DBili  x   /  AST  40  /  ALT  27  /  AlkPhos  70  11-23      Urinalysis Basic - ( 2020 16:09 )  Color: Yellow / Appearance: Slightly Turbid / S.015 / pH: x  Gluc: x / Ketone: Negative  / Bili: Negative / Urobili: Negative   Blood: x / Protein: 100 / Nitrite: Positive   Leuk Esterase: Moderate / RBC: 5-10 /HPF / WBC >50 /HPF   Sq Epi: x / Non Sq Epi: Few /HPF / Bacteria: Moderate /HPF        MEDICATIONS  (STANDING):    apixaban 2.5 milliGRAM(s) Oral every 12 hours  ciprofloxacin     Tablet 500 milliGRAM(s) Oral every 12 hours  digoxin     Tablet 0.125 milliGRAM(s) Oral daily  metoprolol tartrate 25 milliGRAM(s) Oral two times a day  nystatin Ointment 1 Application(s) Topical two times a day  pantoprazole    Tablet 40 milliGRAM(s) Oral before breakfast  sodium chloride 0.9%. 1000 milliLiter(s) (80 mL/Hr) IV Continuous <Continuous>        RADIOLOGY & ADDITIONAL TESTS:    20 : Xray Chest 1 View- PORTABLE-Urgent (Xray Chest 1 View- PORTABLE-Urgent .) (20 @ 18:06) Small left base infiltrate.        MICROBIOLOGY DATA:    Urine Microscopic-Add On (NC) (20 @ 14:00)   Red Blood Cell - Urine: 5-10 /HPF   White Blood Cell - Urine: 6-10 /HPF   Bacteria: Few /HPF   Epithelial Cells: Few /HPF     Culture - Urine (20 @ 02:01)   - Amikacin: S <=16   - Amoxicillin/Clavulanic Acid: S <=8/4   - Ampicillin: S <=8 These ampicillin results predict results for amoxicillin   - Ampicillin/Sulbactam: S <=4/2 Enterobacter, Citrobacter, and Serratia may develop resistance during prolonged therapy (3-4 days)   - Aztreonam: S <=4   - Cefazolin: S <=2 (MIC_CL_COM_ENTERIC_CEFAZU) For uncomplicated UTI with K. pneumoniae, E. coli, or P. mirablis: YULY <=16 is sensitive and YULY >=32 is resistant. This also predicts results for oral agents cefaclor, cefdinir, cefpodoxime, cefprozil, cefuroxime axetil, cephalexin and locarbef for uncomplicated UTI. Note that some isolates may be susceptible to these agents while testing resistant to cefazolin.   - Cefepime: S <=2   - Cefoxitin: S <=8   - Ceftriaxone: S <=1 Enterobacter, Citrobacter, and Serratia may develop resistance during prolonged therapy   - Ciprofloxacin: S <=0.25   - Ertapenem: S <=0.5   - Gentamicin: S <=2   - Imipenem: S <=1   - Levofloxacin: S <=0.5   - Meropenem: S <=1   - Nitrofurantoin: S <=32 Should not be used to treat pyelonephritis   - Piperacillin/Tazobactam: S <=8   - Tigecycline: S <=2   - Tobramycin: S <=2   - Trimethoprim/Sulfamethoxazole: S <=0.5/9.5   Specimen Source: .Urine Clean Catch (Midstream)   Culture Results:   >100,000 CFU/ml Escherichia coli   Culture - Urine (20 @ 02:01)   Specimen Source: .Urine Clean Catch (Midstream)   Culture Results: >100,000 CFU/ml Escherichia coli     Culture - Blood (20 @ 22:16)   Specimen Source: .Blood Blood   Culture Results:  No growth to date.     Culture - Blood (20 @ 22:16)   Specimen Source: .Blood Blood   Culture Results: No growth to date.     Respiratory Viral Panel with COVID-19 by [facility] (20 @ 16:46)   Rapid RVP Result: Rachel   SARS-CoV-2: NotDetec:            Patient is seen and examined at the bed side, is afebrile. No new events.      REVIEW OF SYSTEMS: All other review systems are negative      ALLERGIES: No Known Allergies      Vital Signs Last 24 Hrs  T(C): 36.6 (08 Dec 2020 13:47), Max: 36.6 (07 Dec 2020 21:44)  T(F): 97.9 (08 Dec 2020 13:47), Max: 97.9 (08 Dec 2020 13:47)  HR: 82 (08 Dec 2020 13:47) (70 - 82)  BP: 109/63 (08 Dec 2020 13:47) (109/63 - 127/54)  BP(mean): --  RR: 17 (08 Dec 2020 13:47) (17 - 18)  SpO2: 95% (08 Dec 2020 13:47) (95% - 95%)        PHYSICAL EXAM:  GENERAL: Not in distress   CHEST/LUNG: Not using accessory muscles   HEART: s1 and s2 present  ABDOMEN: Lower abdominal tenderness resolved  EXTREMITIES: No pedal  edema  CNS: Awake and Alert        LABS:                         12.0   7.28  )-----------( 220      ( 08 Dec 2020 06:18 )             37.7                12.3   10.44 )-----------( 351      ( 01 Dec 2020 07:01 )             38.7                         13.0   11.06 )-----------( 367      ( 2020 07:44 )             39.8       12-08    143  |  108  |  27<H>  ----------------------------<  84  4.3   |  25  |  0.85    Ca    9.0      08 Dec 2020 06:18      12-04    140  |  105  |  36<H>  ----------------------------<  83  4.0   |  30  |  0.90    Ca    9.3      04 Dec 2020 06:20    TPro  5.8<L>  /  Alb  2.1<L>  /  TBili  0.6  /  DBili  x   /  AST  40  /  ALT  27  /  AlkPhos  70  11-23      Urinalysis Basic - ( 2020 16:09 )  Color: Yellow / Appearance: Slightly Turbid / S.015 / pH: x  Gluc: x / Ketone: Negative  / Bili: Negative / Urobili: Negative   Blood: x / Protein: 100 / Nitrite: Positive   Leuk Esterase: Moderate / RBC: 5-10 /HPF / WBC >50 /HPF   Sq Epi: x / Non Sq Epi: Few /HPF / Bacteria: Moderate /HPF        MEDICATIONS  (STANDING):    apixaban 2.5 milliGRAM(s) Oral every 12 hours  ciprofloxacin     Tablet 500 milliGRAM(s) Oral every 12 hours  digoxin     Tablet 0.125 milliGRAM(s) Oral daily  metoprolol tartrate 25 milliGRAM(s) Oral two times a day  nystatin Ointment 1 Application(s) Topical two times a day  pantoprazole    Tablet 40 milliGRAM(s) Oral before breakfast  sodium chloride 0.9%. 1000 milliLiter(s) (80 mL/Hr) IV Continuous <Continuous>        RADIOLOGY & ADDITIONAL TESTS:    20 : Xray Chest 1 View- PORTABLE-Urgent (Xray Chest 1 View- PORTABLE-Urgent .) (20 @ 18:06) Small left base infiltrate.        MICROBIOLOGY DATA:    Urine Microscopic-Add On (NC) (20 @ 14:00)   Red Blood Cell - Urine: 5-10 /HPF   White Blood Cell - Urine: 6-10 /HPF   Bacteria: Few /HPF   Epithelial Cells: Few /HPF     Culture - Urine (20 @ 02:01)   - Amikacin: S <=16   - Amoxicillin/Clavulanic Acid: S <=8/4   - Ampicillin: S <=8 These ampicillin results predict results for amoxicillin   - Ampicillin/Sulbactam: S <=4/2 Enterobacter, Citrobacter, and Serratia may develop resistance during prolonged therapy (3-4 days)   - Aztreonam: S <=4   - Cefazolin: S <=2 (MIC_CL_COM_ENTERIC_CEFAZU) For uncomplicated UTI with K. pneumoniae, E. coli, or P. mirablis: YULY <=16 is sensitive and YULY >=32 is resistant. This also predicts results for oral agents cefaclor, cefdinir, cefpodoxime, cefprozil, cefuroxime axetil, cephalexin and locarbef for uncomplicated UTI. Note that some isolates may be susceptible to these agents while testing resistant to cefazolin.   - Cefepime: S <=2   - Cefoxitin: S <=8   - Ceftriaxone: S <=1 Enterobacter, Citrobacter, and Serratia may develop resistance during prolonged therapy   - Ciprofloxacin: S <=0.25   - Ertapenem: S <=0.5   - Gentamicin: S <=2   - Imipenem: S <=1   - Levofloxacin: S <=0.5   - Meropenem: S <=1   - Nitrofurantoin: S <=32 Should not be used to treat pyelonephritis   - Piperacillin/Tazobactam: S <=8   - Tigecycline: S <=2   - Tobramycin: S <=2   - Trimethoprim/Sulfamethoxazole: S <=0.5/9.5   Specimen Source: .Urine Clean Catch (Midstream)   Culture Results:   >100,000 CFU/ml Escherichia coli   Culture - Urine (20 @ 02:01)   Specimen Source: .Urine Clean Catch (Midstream)   Culture Results: >100,000 CFU/ml Escherichia coli     Culture - Blood (20 @ 22:16)   Specimen Source: .Blood Blood   Culture Results:  No growth to date.     Culture - Blood (20 @ 22:16)   Specimen Source: .Blood Blood   Culture Results: No growth to date.     Respiratory Viral Panel with COVID-19 by [facility] (20 @ 16:46)   Rapid RVP Result: Rachel   SARS-CoV-2: NotDetec:

## 2020-12-08 NOTE — PROGRESS NOTE ADULT - ASSESSMENT
Patient is a 85y old  Female from home with PMH of HTN, spinal stenosis, scoliosis presents to ER for evaluation of  lower abdominal pain and dysuria and increased  urinary frequency. On admission, she found to have fever, tachycardia, Leukocytosis and positive Urine analysis. The CXR shows Small left base infiltrate. She has started on Ceftriaxone and the Id consult requested to assist with further evaluation and antibiotic management.     # Sepsis ( Fever + tachycardia+ Leukocytosis)- The Blood cultures have no growth to date   # UTI - urine culture grew E. coli.  # Possible pneumonia- on CXR  # Urinary retention - > 700 cc on bladder scan- 11/23- s/p removal of alfonso catheter, 11/25    would recommend:    1. PT/OOB to chair  2. Monitor kidney function     d/w patient  and Nursing staff    Attending Attestation:    Spent more than 35 minutes on total encounter, more than 50 % of the visit was spent counseling and/or coordinating care by the Attending physician. Patient is a 85y old  Female from home with PMH of HTN, spinal stenosis, scoliosis presents to ER for evaluation of  lower abdominal pain and dysuria and increased  urinary frequency. On admission, she found to have fever, tachycardia, Leukocytosis and positive Urine analysis. The CXR shows Small left base infiltrate. She has started on Ceftriaxone and the Id consult requested to assist with further evaluation and antibiotic management.     # Sepsis ( Fever + tachycardia+ Leukocytosis)- The Blood cultures have no growth to date   # UTI - urine culture grew E. coli.  # Possible pneumonia- on CXR  # Urinary retention - > 700 cc on bladder scan- 11/23- s/p removal of alfonso catheter, 11/25    would recommend:      1. Please discontinue cipro since already completed the course  2. PT/OOB to chair  3. Monitor kidney function     d/w patient  and Nursing staff    Attending Attestation:    Spent more than 35 minutes on total encounter, more than 50 % of the visit was spent counseling and/or coordinating care by the Attending physician.

## 2020-12-08 NOTE — PROGRESS NOTE ADULT - SUBJECTIVE AND OBJECTIVE BOX
CHIEF COMPLAINT:Patient is a 85y old  Female who presents with a chief complaint of abdominal pain and urinary symptoms .Pt appears comfortable.    	  REVIEW OF SYSTEMS:  CONSTITUTIONAL: No fever, weight loss, or fatigue  EYES: No eye pain, visual disturbances, or discharge  ENT:  No difficulty hearing, tinnitus, vertigo; No sinus or throat pain  NECK: No pain or stiffness  RESPIRATORY: No cough, wheezing, chills or hemoptysis; No Shortness of Breath  CARDIOVASCULAR: No chest pain, palpitations, passing out, dizziness, or leg swelling  GASTROINTESTINAL: No abdominal or epigastric pain. No nausea, vomiting, or hematemesis; No diarrhea or constipation. No melena or hematochezia.  GENITOURINARY: No dysuria, frequency, hematuria, or incontinence  NEUROLOGICAL: No headaches, memory loss, loss of strength, numbness, or tremors  SKIN: No itching, burning, rashes, or lesions   LYMPH Nodes: No enlarged glands  ENDOCRINE: No heat or cold intolerance; No hair loss  MUSCULOSKELETAL: No joint pain or swelling; No muscle, back, or extremity pain  PSYCHIATRIC: No depression, anxiety, mood swings, or difficulty sleeping  HEME/LYMPH: No easy bruising, or bleeding gums  ALLERGY AND IMMUNOLOGIC: No hives or eczema	        PHYSICAL EXAM:  T(C): 36.4 (12-08-20 @ 05:29), Max: 36.6 (12-07-20 @ 21:44)  HR: 70 (12-08-20 @ 05:29) (70 - 78)  BP: 127/54 (12-08-20 @ 05:29) (112/51 - 127/54)  RR: 18 (12-08-20 @ 05:29) (18 - 18)  SpO2: 95% (12-08-20 @ 05:29) (95% - 96%)  Wt(kg): --  I&O's Summary      Appearance: Normal	  HEENT:   Normal oral mucosa, PERRL, EOMI	  Lymphatic: No lymphadenopathy  Cardiovascular: Normal S1 S2, No JVD, No murmurs, No edema  Respiratory: Lungs clear to auscultation	  Psychiatry: A & O x 3, Mood & affect appropriate  Gastrointestinal:  Soft, Non-tender, + BS	  Skin: No rashes, No ecchymoses, No cyanosis	  Neurologic: Non-focal  Extremities: Normal range of motion, No clubbing, cyanosis or edema  Vascular: Peripheral pulses palpable 2+ bilaterally    MEDICATIONS  (STANDING):  apixaban 2.5 milliGRAM(s) Oral every 12 hours  ciprofloxacin     Tablet 500 milliGRAM(s) Oral every 12 hours  digoxin     Tablet 0.125 milliGRAM(s) Oral daily  metoprolol tartrate 25 milliGRAM(s) Oral two times a day  nystatin Ointment 1 Application(s) Topical two times a day  pantoprazole    Tablet 40 milliGRAM(s) Oral before breakfast  sodium chloride 0.9%. 1000 milliLiter(s) (80 mL/Hr) IV Continuous <Continuous>      	  	  LABS:	 	                     12.0   7.28  )-----------( 220      ( 08 Dec 2020 06:18 )             37.7     12-08    143  |  108  |  27<H>  ----------------------------<  84  4.3   |  25  |  0.85    Ca    9.0      08 Dec 2020 06:18        Lipid Profile: Cholesterol 115  LDL --  HDL 55  TG 71      TSH: Thyroid Stimulating Hormone, Serum: 1.90 uU/mL (11-23 @ 06:02)

## 2020-12-09 LAB
APPEARANCE UR: CLEAR — SIGNIFICANT CHANGE UP
BILIRUB UR-MCNC: NEGATIVE — SIGNIFICANT CHANGE UP
COLOR SPEC: YELLOW — SIGNIFICANT CHANGE UP
DIFF PNL FLD: ABNORMAL
GLUCOSE UR QL: NEGATIVE — SIGNIFICANT CHANGE UP
KETONES UR-MCNC: NEGATIVE — SIGNIFICANT CHANGE UP
LEUKOCYTE ESTERASE UR-ACNC: NEGATIVE — SIGNIFICANT CHANGE UP
NITRITE UR-MCNC: NEGATIVE — SIGNIFICANT CHANGE UP
PH UR: 5 — SIGNIFICANT CHANGE UP (ref 5–8)
PROT UR-MCNC: NEGATIVE — SIGNIFICANT CHANGE UP
SP GR SPEC: 1.02 — SIGNIFICANT CHANGE UP (ref 1.01–1.02)
UROBILINOGEN FLD QL: NEGATIVE — SIGNIFICANT CHANGE UP

## 2020-12-09 PROCEDURE — 99232 SBSQ HOSP IP/OBS MODERATE 35: CPT

## 2020-12-09 RX ADMIN — Medication 650 MILLIGRAM(S): at 23:10

## 2020-12-09 RX ADMIN — APIXABAN 2.5 MILLIGRAM(S): 2.5 TABLET, FILM COATED ORAL at 05:47

## 2020-12-09 RX ADMIN — PANTOPRAZOLE SODIUM 40 MILLIGRAM(S): 20 TABLET, DELAYED RELEASE ORAL at 05:48

## 2020-12-09 RX ADMIN — NYSTATIN CREAM 1 APPLICATION(S): 100000 CREAM TOPICAL at 19:03

## 2020-12-09 RX ADMIN — NYSTATIN CREAM 1 APPLICATION(S): 100000 CREAM TOPICAL at 05:49

## 2020-12-09 RX ADMIN — Medication 0.12 MILLIGRAM(S): at 05:48

## 2020-12-09 RX ADMIN — APIXABAN 2.5 MILLIGRAM(S): 2.5 TABLET, FILM COATED ORAL at 18:03

## 2020-12-09 RX ADMIN — Medication 650 MILLIGRAM(S): at 22:10

## 2020-12-09 RX ADMIN — Medication 25 MILLIGRAM(S): at 18:03

## 2020-12-09 RX ADMIN — Medication 500 MILLIGRAM(S): at 18:04

## 2020-12-09 RX ADMIN — Medication 500 MILLIGRAM(S): at 05:48

## 2020-12-09 RX ADMIN — Medication 25 MILLIGRAM(S): at 05:48

## 2020-12-09 NOTE — PROGRESS NOTE ADULT - ASSESSMENT
Patient is a 85y old  Female from home with PMH of HTN, spinal stenosis, scoliosis presents to ER for evaluation of  lower abdominal pain and dysuria and increased  urinary frequency. On admission, she found to have fever, tachycardia, Leukocytosis and positive Urine analysis. The CXR shows Small left base infiltrate. She has started on Ceftriaxone and the Id consult requested to assist with further evaluation and antibiotic management.     # Sepsis ( Fever + tachycardia+ Leukocytosis)- The Blood cultures have no growth to date   # UTI - urine culture grew E. coli.  # Possible pneumonia- on CXR  # Urinary retention - > 700 cc on bladder scan- 11/23- s/p removal of alfonso catheter, 11/25    would recommend:      1. Please discontinue cipro since already completed the course  2. PT/OOB to chair  3. Monitor kidney function     d/w patient  and Nursing staff    Attending Attestation:    Spent more than 35 minutes on total encounter, more than 50 % of the visit was spent counseling and/or coordinating care by the Attending physician. Patient is a 85y old  Female from home with PMH of HTN, spinal stenosis, scoliosis presents to ER for evaluation of  lower abdominal pain and dysuria and increased  urinary frequency. On admission, she found to have fever, tachycardia, Leukocytosis and positive Urine analysis. The CXR shows Small left base infiltrate. She has started on Ceftriaxone and the Id consult requested to assist with further evaluation and antibiotic management.     # Sepsis ( Fever + tachycardia+ Leukocytosis)- The Blood cultures have no growth to date   # UTI - urine culture grew E. coli.  # Possible pneumonia- on CXR  # Urinary retention - > 700 cc on bladder scan- 11/23- s/p removal of alfonso catheter, 11/25    would recommend:    1. Please discontinue cipro since already completed the course  2. PT/OOB to chair  3. Monitor kidney function     d/w patient  and Nursing staff    Attending Attestation:    Spent more than 35 minutes on total encounter, more than 50 % of the visit was spent counseling and/or coordinating care by the Attending physician.

## 2020-12-09 NOTE — PROGRESS NOTE ADULT - SUBJECTIVE AND OBJECTIVE BOX
CHIEF COMPLAINT:Patient is a 85y old  Female who presents with a chief complaint of abdominal pain and urinary symptoms.Pt appears comfortable.    	  REVIEW OF SYSTEMS:  CONSTITUTIONAL: No fever, weight loss, or fatigue  EYES: No eye pain, visual disturbances, or discharge  ENT:  No difficulty hearing, tinnitus, vertigo; No sinus or throat pain  NECK: No pain or stiffness  RESPIRATORY: No cough, wheezing, chills or hemoptysis; No Shortness of Breath  CARDIOVASCULAR: No chest pain, palpitations, passing out, dizziness, or leg swelling  GASTROINTESTINAL: No abdominal or epigastric pain. No nausea, vomiting, or hematemesis; No diarrhea or constipation. No melena or hematochezia.  GENITOURINARY: No dysuria, frequency, hematuria, or incontinence  NEUROLOGICAL: No headaches, memory loss, loss of strength, numbness, or tremors  SKIN: No itching, burning, rashes, or lesions   LYMPH Nodes: No enlarged glands  ENDOCRINE: No heat or cold intolerance; No hair loss  MUSCULOSKELETAL: No joint pain or swelling; No muscle, back, or extremity pain  PSYCHIATRIC: No depression, anxiety, mood swings, or difficulty sleeping  HEME/LYMPH: No easy bruising, or bleeding gums  ALLERGY AND IMMUNOLOGIC: No hives or eczema	        PHYSICAL EXAM:  T(C): 36.6 (12-09-20 @ 05:43), Max: 37 (12-08-20 @ 21:10)  HR: 70 (12-09-20 @ 05:43) (70 - 82)  BP: 113/56 (12-09-20 @ 05:43) (109/63 - 118/61)  RR: 18 (12-09-20 @ 05:43) (17 - 18)  SpO2: 96% (12-09-20 @ 05:43) (95% - 96%)  Wt(kg): --  I&O's Summary      Appearance: Normal	  HEENT:   Normal oral mucosa, PERRL, EOMI	  Lymphatic: No lymphadenopathy  Cardiovascular: Normal S1 S2, No JVD, No murmurs, No edema  Respiratory: Lungs clear to auscultation	  Psychiatry: A & O x 3, Mood & affect appropriate  Gastrointestinal:  Soft, Non-tender, + BS	  Skin: No rashes, No ecchymoses, No cyanosis	  Neurologic: Non-focal  Extremities: Normal range of motion, No clubbing, cyanosis or edema  Vascular: Peripheral pulses palpable 2+ bilaterally    MEDICATIONS  (STANDING):  apixaban 2.5 milliGRAM(s) Oral every 12 hours  ciprofloxacin     Tablet 500 milliGRAM(s) Oral every 12 hours  digoxin     Tablet 0.125 milliGRAM(s) Oral daily  metoprolol tartrate 25 milliGRAM(s) Oral two times a day  nystatin Ointment 1 Application(s) Topical two times a day  pantoprazole    Tablet 40 milliGRAM(s) Oral before breakfast  sodium chloride 0.9%. 1000 milliLiter(s) (80 mL/Hr) IV Continuous <Continuous>  	  	  LABS:	 	                       12.0   7.28  )-----------( 220      ( 08 Dec 2020 06:18 )             37.7     12-08    143  |  108  |  27<H>  ----------------------------<  84  4.3   |  25  |  0.85    Ca    9.0      08 Dec 2020 06:18        Lipid Profile: Cholesterol 115  LDL --  HDL 55  TG 71      TSH: Thyroid Stimulating Hormone, Serum: 1.90 uU/mL (11-23 @ 06:02)

## 2020-12-09 NOTE — PROGRESS NOTE ADULT - SUBJECTIVE AND OBJECTIVE BOX
NP Note discussed with  Primary Attending    Patient is a 85y old  Female who presents with a chief complaint of abdominal pain and urinary symptoms (09 Dec 2020 09:22)      INTERVAL HPI/OVERNIGHT EVENTS: no new complaints    MEDICATIONS  (STANDING):  apixaban 2.5 milliGRAM(s) Oral every 12 hours  ciprofloxacin     Tablet 500 milliGRAM(s) Oral every 12 hours  digoxin     Tablet 0.125 milliGRAM(s) Oral daily  metoprolol tartrate 25 milliGRAM(s) Oral two times a day  nystatin Ointment 1 Application(s) Topical two times a day  pantoprazole    Tablet 40 milliGRAM(s) Oral before breakfast  sodium chloride 0.9%. 1000 milliLiter(s) (80 mL/Hr) IV Continuous <Continuous>    MEDICATIONS  (PRN):  acetaminophen   Tablet .. 650 milliGRAM(s) Oral every 6 hours PRN Temp greater or equal to 38C (100.4F)  acetaminophen   Tablet .. 650 milliGRAM(s) Oral every 6 hours PRN Mild Pain (1 - 3), Moderate Pain (4 - 6)  hydrOXYzine hydrochloride 25 milliGRAM(s) Oral every 8 hours PRN sleep/anxiety      __________________________________________________  REVIEW OF SYSTEMS:    CONSTITUTIONAL: No fever,   EYES: no acute visual disturbances  NECK: No pain or stiffness  RESPIRATORY: No cough; No shortness of breath  CARDIOVASCULAR: No chest pain, no palpitations  GASTROINTESTINAL: No pain. No nausea or vomiting; No diarrhea   NEUROLOGICAL: No headache or numbness, no tremors  MUSCULOSKELETAL: No joint pain, no muscle pain  GENITOURINARY: no dysuria, no frequency, no hesitancy  PSYCHIATRY: no depression , no anxiety  ALL OTHER  ROS negative        Vital Signs Last 24 Hrs  T(C): 36.6 (09 Dec 2020 05:43), Max: 37 (08 Dec 2020 21:10)  T(F): 97.9 (09 Dec 2020 05:43), Max: 98.6 (08 Dec 2020 21:10)  HR: 70 (09 Dec 2020 05:43) (70 - 82)  BP: 113/56 (09 Dec 2020 05:43) (109/63 - 118/61)  BP(mean): --  RR: 18 (09 Dec 2020 05:43) (17 - 18)  SpO2: 96% (09 Dec 2020 05:43) (95% - 96%)    ________________________________________________  PHYSICAL EXAM:  GENERAL: NAD  HEENT: Normocephalic;  conjunctivae and sclerae clear; moist mucous membranes;   NECK : supple  CHEST/LUNG: Clear to auscultation bilaterally with good air entry   HEART: S1 S2  regular; no murmurs, gallops or rubs  ABDOMEN: Soft, Nontender, Nondistended; Bowel sounds present  EXTREMITIES: no cyanosis; no edema; no calf tenderness  SKIN: warm and dry; no rash  NERVOUS SYSTEM:  Awake and alert; Oriented  to place, person and time ; no new deficits    _________________________________________________  LABS:                        12.0   7.28  )-----------( 220      ( 08 Dec 2020 06:18 )             37.7     12-08    143  |  108  |  27<H>  ----------------------------<  84  4.3   |  25  |  0.85    Ca    9.0      08 Dec 2020 06:18          CAPILLARY BLOOD GLUCOSE    RADIOLOGY & ADDITIONAL TESTS:    Imaging  Reviewed:  YES  Consultant(s) Notes Reviewed:   YES      Plan of care was discussed with patient and /or primary care giver; all questions and concerns were addressed

## 2020-12-09 NOTE — PROGRESS NOTE ADULT - SUBJECTIVE AND OBJECTIVE BOX
Patient is seen and examined at the bed side, is afebrile. No new events.      REVIEW OF SYSTEMS: All other review systems are negative      ALLERGIES: No Known Allergies      Vital Signs Last 24 Hrs  T(C): 36.4 (09 Dec 2020 14:12), Max: 37 (08 Dec 2020 21:10)  T(F): 97.6 (09 Dec 2020 14:12), Max: 98.6 (08 Dec 2020 21:10)  HR: 80 (09 Dec 2020 14:12) (70 - 80)  BP: 142/73 (09 Dec 2020 14:12) (113/56 - 142/73)  BP(mean): --  RR: 17 (09 Dec 2020 14:12) (17 - 18)  SpO2: 95% (09 Dec 2020 14:12) (95% - 96%)      PHYSICAL EXAM:  GENERAL: Not in distress   CHEST/LUNG: Not using accessory muscles   HEART: s1 and s2 present  ABDOMEN: Lower abdominal tenderness resolved  EXTREMITIES: No pedal  edema  CNS: Awake and Alert        LABS: No new Labs                                   12.0   7.28  )-----------( 220      ( 08 Dec 2020 06:18 )             37.7                12.3   10.44 )-----------( 351      ( 01 Dec 2020 07:01 )             38.7                         13.0   11.06 )-----------( 367      ( 2020 07:44 )             39.8       12-08    143  |  108  |  27<H>  ----------------------------<  84  4.3   |  25  |  0.85    Ca    9.0      08 Dec 2020 06:18    12-04    140  |  105  |  36<H>  ----------------------------<  83  4.0   |  30  |  0.90    Ca    9.3      04 Dec 2020 06:20    TPro  5.8<L>  /  Alb  2.1<L>  /  TBili  0.6  /  DBili  x   /  AST  40  /  ALT  27  /  AlkPhos  70  11-23      Urinalysis Basic - ( 2020 16:09 )  Color: Yellow / Appearance: Slightly Turbid / S.015 / pH: x  Gluc: x / Ketone: Negative  / Bili: Negative / Urobili: Negative   Blood: x / Protein: 100 / Nitrite: Positive   Leuk Esterase: Moderate / RBC: 5-10 /HPF / WBC >50 /HPF   Sq Epi: x / Non Sq Epi: Few /HPF / Bacteria: Moderate /HPF        MEDICATIONS  (STANDING):    apixaban 2.5 milliGRAM(s) Oral every 12 hours  ciprofloxacin     Tablet 500 milliGRAM(s) Oral every 12 hours  digoxin     Tablet 0.125 milliGRAM(s) Oral daily  metoprolol tartrate 25 milliGRAM(s) Oral two times a day  nystatin Ointment 1 Application(s) Topical two times a day  pantoprazole    Tablet 40 milliGRAM(s) Oral before breakfast  sodium chloride 0.9%. 1000 milliLiter(s) (80 mL/Hr) IV Continuous <Continuous>        RADIOLOGY & ADDITIONAL TESTS:    20 : Xray Chest 1 View- PORTABLE-Urgent (Xray Chest 1 View- PORTABLE-Urgent .) (20 @ 18:06) Small left base infiltrate.        MICROBIOLOGY DATA:    Urine Microscopic-Add On (NC) (20 @ 14:00)   Red Blood Cell - Urine: 5-10 /HPF   White Blood Cell - Urine: 6-10 /HPF   Bacteria: Few /HPF   Epithelial Cells: Few /HPF     Culture - Urine (20 @ 02:01)   - Amikacin: S <=16   - Amoxicillin/Clavulanic Acid: S <=8/4   - Ampicillin: S <=8 These ampicillin results predict results for amoxicillin   - Ampicillin/Sulbactam: S <=4/2 Enterobacter, Citrobacter, and Serratia may develop resistance during prolonged therapy (3-4 days)   - Aztreonam: S <=4   - Cefazolin: S <=2 (MIC_CL_COM_ENTERIC_CEFAZU) For uncomplicated UTI with K. pneumoniae, E. coli, or P. mirablis: YULY <=16 is sensitive and YULY >=32 is resistant. This also predicts results for oral agents cefaclor, cefdinir, cefpodoxime, cefprozil, cefuroxime axetil, cephalexin and locarbef for uncomplicated UTI. Note that some isolates may be susceptible to these agents while testing resistant to cefazolin.   - Cefepime: S <=2   - Cefoxitin: S <=8   - Ceftriaxone: S <=1 Enterobacter, Citrobacter, and Serratia may develop resistance during prolonged therapy   - Ciprofloxacin: S <=0.25   - Ertapenem: S <=0.5   - Gentamicin: S <=2   - Imipenem: S <=1   - Levofloxacin: S <=0.5   - Meropenem: S <=1   - Nitrofurantoin: S <=32 Should not be used to treat pyelonephritis   - Piperacillin/Tazobactam: S <=8   - Tigecycline: S <=2   - Tobramycin: S <=2   - Trimethoprim/Sulfamethoxazole: S <=0.5/9.5   Specimen Source: .Urine Clean Catch (Midstream)   Culture Results:   >100,000 CFU/ml Escherichia coli   Culture - Urine (20 @ 02:01)   Specimen Source: .Urine Clean Catch (Midstream)   Culture Results: >100,000 CFU/ml Escherichia coli     Culture - Blood (20 @ 22:16)   Specimen Source: .Blood Blood   Culture Results:  No growth to date.     Culture - Blood (20 @ 22:16)   Specimen Source: .Blood Blood   Culture Results: No growth to date.     Respiratory Viral Panel with COVID-19 by VIDHI (20 @ 16:46)   Rapid RVP Result: Rachel   SARS-CoV-2: NotDetec:

## 2020-12-10 LAB — SARS-COV-2 RNA SPEC QL NAA+PROBE: SIGNIFICANT CHANGE UP

## 2020-12-10 RX ADMIN — NYSTATIN CREAM 1 APPLICATION(S): 100000 CREAM TOPICAL at 18:23

## 2020-12-10 RX ADMIN — Medication 0.12 MILLIGRAM(S): at 06:35

## 2020-12-10 RX ADMIN — Medication 500 MILLIGRAM(S): at 06:35

## 2020-12-10 RX ADMIN — Medication 650 MILLIGRAM(S): at 22:15

## 2020-12-10 RX ADMIN — Medication 25 MILLIGRAM(S): at 06:35

## 2020-12-10 RX ADMIN — APIXABAN 2.5 MILLIGRAM(S): 2.5 TABLET, FILM COATED ORAL at 17:53

## 2020-12-10 RX ADMIN — Medication 650 MILLIGRAM(S): at 15:52

## 2020-12-10 RX ADMIN — Medication 650 MILLIGRAM(S): at 21:39

## 2020-12-10 RX ADMIN — NYSTATIN CREAM 1 APPLICATION(S): 100000 CREAM TOPICAL at 06:35

## 2020-12-10 RX ADMIN — Medication 650 MILLIGRAM(S): at 16:55

## 2020-12-10 RX ADMIN — PANTOPRAZOLE SODIUM 40 MILLIGRAM(S): 20 TABLET, DELAYED RELEASE ORAL at 06:35

## 2020-12-10 RX ADMIN — APIXABAN 2.5 MILLIGRAM(S): 2.5 TABLET, FILM COATED ORAL at 06:35

## 2020-12-10 RX ADMIN — Medication 25 MILLIGRAM(S): at 17:53

## 2020-12-10 NOTE — PROGRESS NOTE ADULT - ASSESSMENT
Patient is a 85y old  Female from home with PMH of HTN, spinal stenosis, scoliosis presents to ER for evaluation of  lower abdominal pain and dysuria and increased  urinary frequency. On admission, she found to have fever, tachycardia, Leukocytosis and positive Urine analysis. The CXR shows Small left base infiltrate. She has started on Ceftriaxone and the Id consult requested to assist with further evaluation and antibiotic management.     # Sepsis ( Fever + tachycardia+ Leukocytosis)- The Blood cultures have no growth to date   # UTI - urine culture grew E. coli.  # Possible pneumonia- on CXR  # Urinary retention - > 700 cc on bladder scan- 11/23- s/p removal of alfonso catheter, 11/25    would recommend:    1. Monitor OFF Abx, s/p completed the course  2. PT/OOB to chair  3. Monitor kidney function     - Awaiting to be discharge home    Attending Attestation:    Spent more than 35 minutes on total encounter, more than 50 % of the visit was spent counseling and/or coordinating care by the Attending physician.

## 2020-12-10 NOTE — CHART NOTE - NSCHARTNOTEFT_GEN_A_CORE
Assessment:   85yFemalePatient is a 85y old  Female who presents with a chief complaint of abdominal pain and urinary symptoms (10 Dec 2020 12:54)      Factors impacting intake: [ ] none [ ] nausea  [ ] vomiting [ ] diarrhea [ ] constipation  [ ]chewing problems [ ] swallowing issues  [x ] other: spoke with patient concerning intake . Reports adequate intake of meals. No complaints of Nausea, vomiting or diarrhea.  also drinking the ensure , sometimes 1 or 2/d. Provide Regular diet and ensure enlive bid as ordered     Diet Presciption: Diet, Regular:   Supplement Feeding Modality:  Oral  Ensure Enlive Cans or Servings Per Day:  1       Frequency:  Two Times a day (11-25-20 @ 13:36)    Intake: adequate     Current Weight: 50.7kg(12/10)   % Weight change: patient with 3% wt gain in 1 week     Pertinent Medications: MEDICATIONS  (STANDING):  apixaban 2.5 milliGRAM(s) Oral every 12 hours  digoxin     Tablet 0.125 milliGRAM(s) Oral daily  metoprolol tartrate 25 milliGRAM(s) Oral two times a day  nystatin Ointment 1 Application(s) Topical two times a day  pantoprazole    Tablet 40 milliGRAM(s) Oral before breakfast  sodium chloride 0.9%. 1000 milliLiter(s) (80 mL/Hr) IV Continuous <Continuous>    MEDICATIONS  (PRN):  acetaminophen   Tablet .. 650 milliGRAM(s) Oral every 6 hours PRN Temp greater or equal to 38C (100.4F)  acetaminophen   Tablet .. 650 milliGRAM(s) Oral every 6 hours PRN Mild Pain (1 - 3), Moderate Pain (4 - 6)  hydrOXYzine hydrochloride 25 milliGRAM(s) Oral every 8 hours PRN sleep/anxiety    Pertinent Labs: 12-08 Na143 mmol/L Glu 84 mg/dL K+ 4.3 mmol/L Cr  0.85 mg/dL BUN 27 mg/dL<H> 11-23 Chol 115 mg/dL LDL --    HDL 55 mg/dL Trig 71 mg/dL     CAPILLARY BLOOD GLUCOSE        Skin: No pressure ulcers     Estimated Needs:   [ x] no change since previous assessment  [ ] recalculated:     Previous Nutrition Diagnosis:   [ ] Inadequate Energy Intake [x ]Inadequate Oral Intake [ ] Excessive Energy Intake   [ ] Underweight [ ] Increased Nutrient Needs [ ] Overweight/Obesity   [ ] Altered GI Function [ ] Unintended Weight Loss [ ] Food & Nutrition Related Knowledge Deficit [ ] Malnutrition     Nutrition Diagnosis is [x ] ongoing  [ ] resolved [ ] not applicable     New Nutrition Diagnosis: [ ] not applicable       Interventions:   Recommend  [ ] Change Diet To:  [ ] Nutrition Supplement  [ ] Nutrition Support  [ x] Other: Provide Regular diet and ensure enlive bid as ordered . Patient will tolerate diet with no Nausea, vomiting or diarrhea      Monitoring and Evaluation:   [ x] PO intake [ x ] Tolerance to diet prescription [ x ] weights [ x ] labs[ x ] follow up per protocol  [ ] other: Assessment:   85yFemalePatient is a 85y old  Female who presents with a chief complaint of abdominal pain and urinary symptoms (10 Dec 2020 12:54)      Factors impacting intake: [ ] none [ ] nausea  [ ] vomiting [ ] diarrhea [ ] constipation  [ ]chewing problems [ ] swallowing issues  [x ] other: spoke with patient concerning intake . ( forgetful at times). Reports adequate intake of meals. No complaints of Nausea, vomiting or diarrhea.  also drinking the ensure , sometimes 1 or 2/d. Provide Regular diet and ensure enlive bid as ordered     Diet Presciption: Diet, Regular:   Supplement Feeding Modality:  Oral  Ensure Enlive Cans or Servings Per Day:  1       Frequency:  Two Times a day (11-25-20 @ 13:36)    Intake: adequate     Current Weight: 50.7kg(12/10)   % Weight change: patient with 3% wt gain in 1 week     Pertinent Medications: MEDICATIONS  (STANDING):  apixaban 2.5 milliGRAM(s) Oral every 12 hours  digoxin     Tablet 0.125 milliGRAM(s) Oral daily  metoprolol tartrate 25 milliGRAM(s) Oral two times a day  nystatin Ointment 1 Application(s) Topical two times a day  pantoprazole    Tablet 40 milliGRAM(s) Oral before breakfast  sodium chloride 0.9%. 1000 milliLiter(s) (80 mL/Hr) IV Continuous <Continuous>    MEDICATIONS  (PRN):  acetaminophen   Tablet .. 650 milliGRAM(s) Oral every 6 hours PRN Temp greater or equal to 38C (100.4F)  acetaminophen   Tablet .. 650 milliGRAM(s) Oral every 6 hours PRN Mild Pain (1 - 3), Moderate Pain (4 - 6)  hydrOXYzine hydrochloride 25 milliGRAM(s) Oral every 8 hours PRN sleep/anxiety    Pertinent Labs: 12-08 Na143 mmol/L Glu 84 mg/dL K+ 4.3 mmol/L Cr  0.85 mg/dL BUN 27 mg/dL<H> 11-23 Chol 115 mg/dL LDL --    HDL 55 mg/dL Trig 71 mg/dL     CAPILLARY BLOOD GLUCOSE        Skin: No pressure ulcers     Estimated Needs:   [ x] no change since previous assessment  [ ] recalculated:     Previous Nutrition Diagnosis:   [ ] Inadequate Energy Intake [x ]Inadequate Oral Intake [ ] Excessive Energy Intake   [ ] Underweight [ ] Increased Nutrient Needs [ ] Overweight/Obesity   [ ] Altered GI Function [ ] Unintended Weight Loss [ ] Food & Nutrition Related Knowledge Deficit [ ] Malnutrition     Nutrition Diagnosis is [x ] ongoing  [ ] resolved [ ] not applicable     New Nutrition Diagnosis: [ ] not applicable       Interventions:   Recommend  [ ] Change Diet To:  [ ] Nutrition Supplement  [ ] Nutrition Support  [ x] Other: Provide Regular diet and ensure enlive bid as ordered . Patient will tolerate diet with no Nausea, vomiting or diarrhea      Monitoring and Evaluation:   [ x] PO intake [ x ] Tolerance to diet prescription [ x ] weights [ x ] labs[ x ] follow up per protocol  [ ] other:

## 2020-12-10 NOTE — PROGRESS NOTE ADULT - SUBJECTIVE AND OBJECTIVE BOX
NP Note discussed with  Primary Attending    Patient is a 85y old  Female who presents with a chief complaint of abdominal pain and urinary symptoms (10 Dec 2020 12:54)    85 yr F from home with PMH of HTN, spinal stenosis, scoliosis presents admitted for sepsis secondary to UTI. Urine cultures with E. Coli, treated with antibiotics. Also found to have urinary retention, alfonso catheter now dcd and pt voiding freely. Course complicated by afib/flutter on tele, cardiology consulted, anti arrhythmics adjusted, now resolved. ECHO normal with EF 55-60%. Pt unable to be discharged to home because she is a hoarder and at risk for being evicted. Psych consulted, pt does not have capacity in discharge planning.   seen and examined pt at bedside, stable on exam, in NAD.   INTERVAL HPI/OVERNIGHT EVENTS: no new complaints    MEDICATIONS  (STANDING):  apixaban 2.5 milliGRAM(s) Oral every 12 hours  digoxin     Tablet 0.125 milliGRAM(s) Oral daily  metoprolol tartrate 25 milliGRAM(s) Oral two times a day  nystatin Ointment 1 Application(s) Topical two times a day  pantoprazole    Tablet 40 milliGRAM(s) Oral before breakfast  sodium chloride 0.9%. 1000 milliLiter(s) (80 mL/Hr) IV Continuous <Continuous>    MEDICATIONS  (PRN):  acetaminophen   Tablet .. 650 milliGRAM(s) Oral every 6 hours PRN Temp greater or equal to 38C (100.4F)  acetaminophen   Tablet .. 650 milliGRAM(s) Oral every 6 hours PRN Mild Pain (1 - 3), Moderate Pain (4 - 6)  hydrOXYzine hydrochloride 25 milliGRAM(s) Oral every 8 hours PRN sleep/anxiety      __________________________________________________  REVIEW OF SYSTEMS:    CONSTITUTIONAL: No fever,   EYES: no acute visual disturbances  NECK: No pain or stiffness  RESPIRATORY: No cough; No shortness of breath  CARDIOVASCULAR: No chest pain, no palpitations  GASTROINTESTINAL: No pain. No nausea or vomiting; No diarrhea   NEUROLOGICAL: No headache or numbness, no tremors  MUSCULOSKELETAL: No joint pain, no muscle pain  GENITOURINARY: no dysuria, no frequency, no hesitancy  PSYCHIATRY: no depression , no anxiety  ALL OTHER  ROS negative        Vital Signs Last 24 Hrs  T(C): 36.8 (10 Dec 2020 05:21), Max: 36.8 (10 Dec 2020 05:21)  T(F): 98.2 (10 Dec 2020 05:21), Max: 98.2 (10 Dec 2020 05:21)  HR: 77 (10 Dec 2020 05:21) (75 - 87)  BP: 135/75 (10 Dec 2020 05:21) (115/87 - 142/73)  BP(mean): --  RR: 18 (10 Dec 2020 05:21) (17 - 18)  SpO2: 96% (10 Dec 2020 05:21) (95% - 97%)    ________________________________________________  PHYSICAL EXAM:  GENERAL: NAD  HEENT: Normocephalic;  conjunctivae and sclerae clear; moist mucous membranes;   NECK : supple  CHEST/LUNG: Clear to auscultation bilaterally with good air entry   HEART: S1 S2  regular; no murmurs, gallops or rubs  ABDOMEN: Soft, Nontender, Nondistended; Bowel sounds present  EXTREMITIES: no cyanosis; no edema; no calf tenderness  SKIN: warm and dry; no rash  NERVOUS SYSTEM:  Awake and alert; Oriented  to place, person and time ; no new deficits    _________________________________________________  LABS:            Urinalysis Basic - ( 09 Dec 2020 19:28 )    Color: Yellow / Appearance: Clear / S.020 / pH: x  Gluc: x / Ketone: Negative  / Bili: Negative / Urobili: Negative   Blood: x / Protein: Negative / Nitrite: Negative   Leuk Esterase: Negative / RBC: 5-10 /HPF / WBC 0-2 /HPF   Sq Epi: x / Non Sq Epi: Occasional /HPF / Bacteria: Trace /HPF      CAPILLARY BLOOD GLUCOSE            RADIOLOGY & ADDITIONAL TESTS:    Imaging Personally Reviewed:  YES/NO    Consultant(s) Notes Reviewed:   YES/ No    Care Discussed with Consultants :     Plan of care was discussed with patient and /or primary care giver; all questions and concerns were addressed and care was aligned with patient's wishes.

## 2020-12-10 NOTE — PROGRESS NOTE ADULT - SUBJECTIVE AND OBJECTIVE BOX
CHIEF COMPLAINT:Patient is a 85y old  Female who presents with a chief complaint of abdominal pain and urinary symptoms.Pt appears comfortable.    	  REVIEW OF SYSTEMS:  CONSTITUTIONAL: No fever, weight loss, or fatigue  EYES: No eye pain, visual disturbances, or discharge  ENT:  No difficulty hearing, tinnitus, vertigo; No sinus or throat pain  NECK: No pain or stiffness  RESPIRATORY: No cough, wheezing, chills or hemoptysis; No Shortness of Breath  CARDIOVASCULAR: No chest pain, palpitations, passing out, dizziness, or leg swelling  GASTROINTESTINAL: No abdominal or epigastric pain. No nausea, vomiting, or hematemesis; No diarrhea or constipation. No melena or hematochezia.  GENITOURINARY: No dysuria, frequency, hematuria, or incontinence  NEUROLOGICAL: No headaches, memory loss, loss of strength, numbness, or tremors  SKIN: No itching, burning, rashes, or lesions   LYMPH Nodes: No enlarged glands  ENDOCRINE: No heat or cold intolerance; No hair loss  MUSCULOSKELETAL: No joint pain or swelling; No muscle, back, or extremity pain  PSYCHIATRIC: No depression, anxiety, mood swings, or difficulty sleeping  HEME/LYMPH: No easy bruising, or bleeding gums  ALLERGY AND IMMUNOLOGIC: No hives or eczema	        PHYSICAL EXAM:  T(C): 36.8 (12-10-20 @ 05:21), Max: 36.8 (12-10-20 @ 05:21)  HR: 77 (12-10-20 @ 05:21) (74 - 87)  BP: 135/75 (12-10-20 @ 05:21) (115/87 - 142/73)  RR: 18 (12-10-20 @ 05:21) (17 - 18)  SpO2: 96% (12-10-20 @ 05:21) (95% - 97%)      Appearance: Normal	  HEENT:   Normal oral mucosa, PERRL, EOMI	  Lymphatic: No lymphadenopathy  Cardiovascular: Normal S1 S2, No JVD, No murmurs, No edema  Respiratory: Lungs clear to auscultation	  Psychiatry: A & O x 3, Mood & affect appropriate  Gastrointestinal:  Soft, Non-tender, + BS	  Skin: No rashes, No ecchymoses, No cyanosis	  Neurologic: Non-focal  Extremities: Normal range of motion, No clubbing, cyanosis or edema  Vascular: Peripheral pulses palpable 2+ bilaterally    MEDICATIONS  (STANDING):  apixaban 2.5 milliGRAM(s) Oral every 12 hours  ciprofloxacin     Tablet 500 milliGRAM(s) Oral every 12 hours  digoxin     Tablet 0.125 milliGRAM(s) Oral daily  metoprolol tartrate 25 milliGRAM(s) Oral two times a day  nystatin Ointment 1 Application(s) Topical two times a day  pantoprazole    Tablet 40 milliGRAM(s) Oral before breakfast  sodium chloride 0.9%. 1000 milliLiter(s) (80 mL/Hr) IV Continuous <Continuous>    	  	  LABS:	 	    Lipid Profile: Cholesterol 115  LDL --  HDL 55  TG 71      TSH: Thyroid Stimulating Hormone, Serum: 1.90 uU/mL (11-23 @ 06:02)

## 2020-12-10 NOTE — PROGRESS NOTE ADULT - SUBJECTIVE AND OBJECTIVE BOX
Patient is seen and examined at the bed side, is afebrile. She mentioned feeling better.      REVIEW OF SYSTEMS: All other review systems are negative      ALLERGIES: No Known Allergies      Vital Signs Last 24 Hrs  T(C): 36.8 (10 Dec 2020 05:21), Max: 36.8 (10 Dec 2020 05:21)  T(F): 98.2 (10 Dec 2020 05:21), Max: 98.2 (10 Dec 2020 05:21)  HR: 77 (10 Dec 2020 05:21) (74 - 87)  BP: 135/75 (10 Dec 2020 05:21) (115/87 - 142/73)  BP(mean): --  RR: 18 (10 Dec 2020 05:) (17 - 18)  SpO2: 96% (10 Dec 2020 05:) (95% - 97%)      PHYSICAL EXAM:  GENERAL: Not in distress   CHEST/LUNG: Not using accessory muscles   HEART: s1 and s2 present  ABDOMEN: Lower abdominal tenderness resolved  EXTREMITIES: No pedal  edema  CNS: Awake and Alert        LABS: No new Labs                                     12.0   7.28  )-----------( 220      ( 08 Dec 2020 06:18 )             37.7                12.3   10.44 )-----------( 351      ( 01 Dec 2020 07:01 )             38.7                         13.0   11.06 )-----------( 367      ( 2020 07:44 )             39.8         12-08    143  |  108  |  27<H>  ----------------------------<  84  4.3   |  25  |  0.85    Ca    9.0      08 Dec 2020 06:18    12-04    140  |  105  |  36<H>  ----------------------------<  83  4.0   |  30  |  0.90    Ca    9.3      04 Dec 2020 06:20    TPro  5.8<L>  /  Alb  2.1<L>  /  TBili  0.6  /  DBili  x   /  AST  40  /  ALT  27  /  AlkPhos  70  11-23      Urinalysis Basic - ( 2020 16:09 )  Color: Yellow / Appearance: Slightly Turbid / S.015 / pH: x  Gluc: x / Ketone: Negative  / Bili: Negative / Urobili: Negative   Blood: x / Protein: 100 / Nitrite: Positive   Leuk Esterase: Moderate / RBC: 5-10 /HPF / WBC >50 /HPF   Sq Epi: x / Non Sq Epi: Few /HPF / Bacteria: Moderate /HPF        MEDICATIONS  (STANDING):    apixaban 2.5 milliGRAM(s) Oral every 12 hours  digoxin     Tablet 0.125 milliGRAM(s) Oral daily  metoprolol tartrate 25 milliGRAM(s) Oral two times a day  nystatin Ointment 1 Application(s) Topical two times a day  pantoprazole    Tablet 40 milliGRAM(s) Oral before breakfast  sodium chloride 0.9%. 1000 milliLiter(s) (80 mL/Hr) IV Continuous <Continuous>        RADIOLOGY & ADDITIONAL TESTS:    20 : Xray Chest 1 View- PORTABLE-Urgent (Xray Chest 1 View- PORTABLE-Urgent .) (20 @ 18:06) Small left base infiltrate.        MICROBIOLOGY DATA:    Urine Microscopic-Add On (NC) (20 @ 14:00)   Red Blood Cell - Urine: 5-10 /HPF   White Blood Cell - Urine: 6-10 /HPF   Bacteria: Few /HPF   Epithelial Cells: Few /HPF     Culture - Urine (20 @ 02:01)   - Amikacin: S <=16   - Amoxicillin/Clavulanic Acid: S <=8/4   - Ampicillin: S <=8 These ampicillin results predict results for amoxicillin   - Ampicillin/Sulbactam: S <=4/2 Enterobacter, Citrobacter, and Serratia may develop resistance during prolonged therapy (3-4 days)   - Aztreonam: S <=4   - Cefazolin: S <=2 ([id number]) For uncomplicated UTI with K. pneumoniae, E. coli, or P. mirablis: YULY <=16 is sensitive and UYLY >=32 is resistant. This also predicts results for oral agents cefaclor, cefdinir, cefpodoxime, cefprozil, cefuroxime axetil, cephalexin and locarbef for uncomplicated UTI. Note that some isolates may be susceptible to these agents while testing resistant to cefazolin.   - Cefepime: S <=2   - Cefoxitin: S <=8   - Ceftriaxone: S <=1 Enterobacter, Citrobacter, and Serratia may develop resistance during prolonged therapy   - Ciprofloxacin: S <=0.25   - Ertapenem: S <=0.5   - Gentamicin: S <=2   - Imipenem: S <=1   - Levofloxacin: S <=0.5   - Meropenem: S <=1   - Nitrofurantoin: S <=32 Should not be used to treat pyelonephritis   - Piperacillin/Tazobactam: S <=8   - Tigecycline: S <=2   - Tobramycin: S <=2   - Trimethoprim/Sulfamethoxazole: S <=0.5/9.5   Specimen Source: .Urine Clean Catch (Midstream)   Culture Results:   >100,000 CFU/ml Escherichia coli   Culture - Urine (20 @ 02:01)   Specimen Source: .Urine Clean Catch (Midstream)   Culture Results: >100,000 CFU/ml Escherichia coli     Culture - Blood (20 @ 22:16)   Specimen Source: .Blood Blood   Culture Results:  No growth to date.     Culture - Blood (20 @ 22:16)   Specimen Source: .Blood Blood   Culture Results: No growth to date.     Respiratory Viral Panel with COVID-19 by VIDHI (20 @ 16:46)   Rapid RVP Result: Rachel   SARS-CoV-2: NotDetec:

## 2020-12-10 NOTE — PROGRESS NOTE ADULT - ATTENDING COMMENTS
Spoke with pt's HCP today. They are almost near completion of the task of cleaning out pt's apartment. After much time spent convincing pt, pt has agreed to have all items (aside from important papers and photos) discarded as opposed to moving it to into storage (which would impose further financial burdens of monthly payments which she says she can not afford).     Expecting possible d/c tomorrow.

## 2020-12-11 ENCOUNTER — TRANSCRIPTION ENCOUNTER (OUTPATIENT)
Age: 85
End: 2020-12-11

## 2020-12-11 VITALS
SYSTOLIC BLOOD PRESSURE: 119 MMHG | HEART RATE: 74 BPM | DIASTOLIC BLOOD PRESSURE: 63 MMHG | TEMPERATURE: 98 F | RESPIRATION RATE: 18 BRPM | OXYGEN SATURATION: 95 % | WEIGHT: 111.77 LBS

## 2020-12-11 RX ORDER — HYDROXYZINE HCL 10 MG
1 TABLET ORAL
Qty: 90 | Refills: 0
Start: 2020-12-11 | End: 2021-01-09

## 2020-12-11 RX ORDER — ASPIRIN/CALCIUM CARB/MAGNESIUM 324 MG
1 TABLET ORAL
Qty: 0 | Refills: 0 | DISCHARGE

## 2020-12-11 RX ORDER — METOPROLOL TARTRATE 50 MG
1 TABLET ORAL
Qty: 60 | Refills: 0
Start: 2020-12-11 | End: 2021-01-09

## 2020-12-11 RX ORDER — CLOPIDOGREL BISULFATE 75 MG/1
1 TABLET, FILM COATED ORAL
Qty: 0 | Refills: 0 | DISCHARGE

## 2020-12-11 RX ORDER — AMLODIPINE BESYLATE 2.5 MG/1
1 TABLET ORAL
Qty: 0 | Refills: 0 | DISCHARGE

## 2020-12-11 RX ORDER — APIXABAN 2.5 MG/1
1 TABLET, FILM COATED ORAL
Qty: 60 | Refills: 0
Start: 2020-12-11 | End: 2021-01-09

## 2020-12-11 RX ORDER — DIGOXIN 250 MCG
1 TABLET ORAL
Qty: 30 | Refills: 0
Start: 2020-12-11 | End: 2021-01-09

## 2020-12-11 RX ADMIN — APIXABAN 2.5 MILLIGRAM(S): 2.5 TABLET, FILM COATED ORAL at 06:02

## 2020-12-11 RX ADMIN — Medication 25 MILLIGRAM(S): at 06:02

## 2020-12-11 RX ADMIN — Medication 0.12 MILLIGRAM(S): at 06:02

## 2020-12-11 RX ADMIN — PANTOPRAZOLE SODIUM 40 MILLIGRAM(S): 20 TABLET, DELAYED RELEASE ORAL at 06:02

## 2020-12-11 RX ADMIN — NYSTATIN CREAM 1 APPLICATION(S): 100000 CREAM TOPICAL at 06:03

## 2020-12-11 NOTE — PROGRESS NOTE ADULT - PROBLEM SELECTOR PLAN 5
Full code
Full code
Cont Lopressor 25mg BID
Full code
RISK                                                          Points  [  ] Previous VTE                                                3  [  ] Thrombophilia                                             2  [  ] Lower limb paralysis                                   2        (unable to hold up >15 seconds)    [  ] Current Cancer                                             2         (within 6 months)  [ x ] Immobilization > 24 hrs                              1  [  ] ICU/CCU stay > 24 hours                             1  [ x ] Age > 60                                                         1    IMPROVE VTE Score: 2  lovenox for VTE prophylaxis.
Serum Albumin 2.6->2.1, likely due to poor nutrition  -Nutrition consult for supplements
Serum Albumin 2.6->2.1, likely due to poor nutrition  -Nutrition note appreciated  -Enlive 1can BID added to diet

## 2020-12-11 NOTE — DISCHARGE NOTE NURSING/CASE MANAGEMENT/SOCIAL WORK - PATIENT PORTAL LINK FT
You can access the FollowMyHealth Patient Portal offered by [facility] by registering at the following website: [email or web address]. By joining [facility]’s FollowMyHealth portal, you will also be able to view your health information using other applications (apps) compatible with our system.

## 2020-12-11 NOTE — PROGRESS NOTE ADULT - PROBLEM SELECTOR PROBLEM 4
Need for prophylactic measure
Need for prophylactic measure
HTN (hypertension)
Need for prophylactic measure
Paroxysmal atrial fibrillation with RVR
Urinary retention

## 2020-12-11 NOTE — PROGRESS NOTE ADULT - REASON FOR ADMISSION
abdominal pain and urinary symptoms

## 2020-12-11 NOTE — PROGRESS NOTE ADULT - PROBLEM SELECTOR PLAN 2
Resolved  Pt voiding freely
Resolved  Pt voiding freely
Pt. with adjustment disorder per psych eval-note appreciated  -Disorder likely superimposed with recent loss of spouse  -Pt. with no capacity to participate with her dispo planning  -Refuses SANTOSH  -Refuses to allow access to her home for APS cleaning  -Recommended prn seroquel q8hrs or Haldol 0.5mg for agitation
Rapid A-fib/flutter on tele, rate up to 140bpm on 11/24-currently SR 85bpm  -Pt. states she was never diagnosed with A-Fib  -Card Dr. Son   -Cont Eliquis 2.5mg BID, Metoprolol 25mg BID and Digoxin 0.125mg daily  -ECHO Nl, EF 55-60%  -f/u dig level prior to administering am dose.  (documented level from 11/30 incorrect as it was drawn right after administering a dose)
Rapid A-fib/flutter on tele, rate up to 140bpm on 11/24-currently SR 85bpm  Pt. states she was never diagnosed with A-Fib  Card Dr. Huy Romero Eliquis 2.5mg BID, Metoprolol 25mg BID and Digoxin 0.125mg daily  ECHO Nl, EF 55-60%  f/u dig level prior to administering am dose.  (documented level from 11/30 incorrect as it was drawn right after administering a dose).
Rapid A-fib/flutter on tele, rate up to 140bpm on 11/24-currently ST 85bpm  -Pt. states she was never diagnosed with A-Fib  -Card Dr. Son   -Cont Eliquis 2.5mg BID, Metoprolol 25mg BID and Digoxin 0.125mg daily  -ECHO Nl, EF 55-60%
Rapid A-fib/flutter on tele, rate up to 140bpm, asymptomatic  -Pt. states she was never diagnosed with A-Fib  -Card Dr. Son consulted  -Eliquis 2.5mg BID, Metoprolol 2.5mg q8hrs and Digoxin 0.5mg x1 started  -Pt. is back in SR 85bpm at this time  -f/u ECHO
Resolved  Pt voiding freely
c/w amlodipine 5 mg  Monitor BP
c/w amlodipine 5 mg  Monitor BP

## 2020-12-11 NOTE — PROGRESS NOTE ADULT - PROBLEM SELECTOR PROBLEM 2
Urinary retention
Urinary retention
Adjustment disorder
HTN (hypertension)
HTN (hypertension)
Paroxysmal atrial fibrillation with RVR
Urinary retention

## 2020-12-11 NOTE — PROGRESS NOTE ADULT - PROBLEM SELECTOR PROBLEM 3
Paroxysmal atrial fibrillation with RVR
Paroxysmal atrial fibrillation with RVR
Anxiety
HTN (hypertension)
Hypoalbuminemia due to protein-calorie malnutrition
Need for prophylactic measure
Need for prophylactic measure
Paroxysmal atrial fibrillation with RVR

## 2020-12-11 NOTE — PROGRESS NOTE ADULT - SUBJECTIVE AND OBJECTIVE BOX
CHIEF COMPLAINT:Patient is a 85y old  Female who presents with a chief complaint of abdominal pain and urinary symptoms .Pt appears comfortable.    	  REVIEW OF SYSTEMS:  CONSTITUTIONAL: No fever, weight loss, or fatigue  EYES: No eye pain, visual disturbances, or discharge  ENT:  No difficulty hearing, tinnitus, vertigo; No sinus or throat pain  NECK: No pain or stiffness  RESPIRATORY: No cough, wheezing, chills or hemoptysis; No Shortness of Breath  CARDIOVASCULAR: No chest pain, palpitations, passing out, dizziness, or leg swelling  GASTROINTESTINAL: No abdominal or epigastric pain. No nausea, vomiting, or hematemesis; No diarrhea or constipation. No melena or hematochezia.  GENITOURINARY: No dysuria, frequency, hematuria, or incontinence  NEUROLOGICAL: No headaches, memory loss, loss of strength, numbness, or tremors  SKIN: No itching, burning, rashes, or lesions   LYMPH Nodes: No enlarged glands  ENDOCRINE: No heat or cold intolerance; No hair loss  MUSCULOSKELETAL: No joint pain or swelling; No muscle, back, or extremity pain  PSYCHIATRIC: No depression, anxiety, mood swings, or difficulty sleeping  HEME/LYMPH: No easy bruising, or bleeding gums  ALLERGY AND IMMUNOLOGIC: No hives or eczema	        PHYSICAL EXAM:  T(C): 36.8 (12-11-20 @ 05:34), Max: 36.8 (12-11-20 @ 05:34)  HR: 74 (12-11-20 @ 05:34) (72 - 80)  BP: 119/63 (12-11-20 @ 05:34) (117/65 - 125/66)  RR: 18 (12-11-20 @ 05:34) (17 - 18)  SpO2: 95% (12-11-20 @ 05:34) (95% - 95%)    I&O's Summary    10 Dec 2020 07:01  -  11 Dec 2020 07:00  --------------------------------------------------------  IN: 0 mL / OUT: 200 mL / NET: -200 mL        Appearance: Normal	  HEENT:   Normal oral mucosa, PERRL, EOMI	  Lymphatic: No lymphadenopathy  Cardiovascular: Normal S1 S2, No JVD, No murmurs, No edema  Respiratory: Lungs clear to auscultation	  Psychiatry: A & O x 3, Mood & affect appropriate  Gastrointestinal:  Soft, Non-tender, + BS	  Skin: No rashes, No ecchymoses, No cyanosis	  Neurologic: Non-focal  Extremities: Normal range of motion, No clubbing, cyanosis or edema  Vascular: Peripheral pulses palpable 2+ bilaterally    MEDICATIONS  (STANDING):  apixaban 2.5 milliGRAM(s) Oral every 12 hours  digoxin     Tablet 0.125 milliGRAM(s) Oral daily  metoprolol tartrate 25 milliGRAM(s) Oral two times a day  nystatin Ointment 1 Application(s) Topical two times a day  pantoprazole    Tablet 40 milliGRAM(s) Oral before breakfast  sodium chloride 0.9%. 1000 milliLiter(s) (80 mL/Hr) IV Continuous <Continuous>     	  	  LABS:	 	      Lipid Profile: Cholesterol 115  LDL --  HDL 55  TG 71      TSH: Thyroid Stimulating Hormone, Serum: 1.90 uU/mL (11-23 @ 06:02)

## 2020-12-11 NOTE — PROGRESS NOTE ADULT - PROBLEM SELECTOR PLAN 4
DVT ppx - eliquis
DVT ppx - eliquis
Cont Lopressor 25mg BID
DVT ppx - eliquis
Had urinary retention ~700ml on admission, unknown etiology, required alfonso placement  -TOV 6am 11/25  -If fails will consult urology
Had urinary retention ~700ml on admission, unknown etiology, required alfonso placement  -TOV 6am 11/25  -If fails will consult urology
Had urinary retention ~700ml on admission, unknown etiology, required alfonso placement  -TOV passed
Jaimes d/c'd 11/25-6am  -Voiding freely   - bladder scan today<100ml
Jaimes d/c'd 11/25-6am  -Voiding freely   -Will f/u bladder scan periodically
Rapid A-fib/flutter on tele, rate up to 140bpm on 11/24-currently SR 85bpm  -Pt. states she was never diagnosed with A-Fib  -Card Dr. Son   -Cont Eliquis 2.5mg BID, Metoprolol 25mg BID and Digoxin 0.125mg daily  -ECHO Nl, EF 55-60%  -f/u dig level prior to administering am dose.  (documented level from 11/30 incorrect as it was drawn right after administering a dose)

## 2020-12-11 NOTE — PROGRESS NOTE ADULT - PROBLEM SELECTOR PLAN 6
Deemed to not have capacity in discharge planning  Unable to return home due to hoarding and risk of eviction, APS following   Refusing SANTOSH  Social work following - if APS can inspect her home today, will send pt home with HHA  COVID  negative 12/10/2020
Deemed to not have capacity in discharge planning  Unable to return home due to hoarding and risk of eviction, APS following   Refusing SANTOSH  Social work following - if APS can inspect her home today, will send pt home with HHA  COVID sent today - f/u result
Deemed to not have capacity in discharge planning  Unable to return home due to hoarding and risk of eviction, APS following   Refusing SANTOSH  Social work following
Jaimes d/c'd 11/25-6am  -Voiding freely   - bladder scan today<100ml
Pt. lives alone.  Her  passed away due to COVID last May, she has no children or other relatives,  Pt.'s neighbor calls frequently to inform us that pt. will need assistance/placement as her home situation is unsafe due to hoarding.  PT recommended SANTOSH  Pt. adamantly refusing SANTOSH.  Can not discharge to home as she as a do not discharge order from APS  SW/CM following  Awaiting psych eval.
RISK                                                          Points  [  ] Previous VTE                                                3  [  ] Thrombophilia                                             2  [  ] Lower limb paralysis                                   2        (unable to hold up >15 seconds)    [  ] Current Cancer                                             2         (within 6 months)  [ x ] Immobilization > 24 hrs                              1  [  ] ICU/CCU stay > 24 hours                             1  [ x ] Age > 60                                                         1    IMPROVE VTE Score: 2  lovenox for VTE prophylaxis.

## 2020-12-11 NOTE — PROGRESS NOTE ADULT - PROBLEM SELECTOR PROBLEM 6
Discharge planning issues
Need for prophylactic measure
Urinary retention

## 2020-12-11 NOTE — PROGRESS NOTE ADULT - PROBLEM SELECTOR PROBLEM 5
Advanced care planning/counseling discussion
HTN (hypertension)
Hypoalbuminemia due to protein-calorie malnutrition
Need for prophylactic measure

## 2020-12-11 NOTE — PROGRESS NOTE ADULT - PROBLEM SELECTOR PROBLEM 1
UTI (urinary tract infection)
UTI (urinary tract infection)
Sepsis secondary to UTI
UTI (urinary tract infection)

## 2020-12-11 NOTE — PROGRESS NOTE ADULT - SUBJECTIVE AND OBJECTIVE BOX
NP Note discussed with  Primary Attending    Patient is a 85y old  Female who presents with a chief complaint of abdominal pain and urinary symptoms (11 Dec 2020 10:01)      INTERVAL HPI/OVERNIGHT EVENTS: Patient examined at her bedside. she offers  no new complaints. COVID results form 12/10/2020 are negative. patient is going to be discharge today at 1400    MEDICATIONS  (STANDING):  apixaban 2.5 milliGRAM(s) Oral every 12 hours  digoxin     Tablet 0.125 milliGRAM(s) Oral daily  metoprolol tartrate 25 milliGRAM(s) Oral two times a day  nystatin Ointment 1 Application(s) Topical two times a day  pantoprazole    Tablet 40 milliGRAM(s) Oral before breakfast  sodium chloride 0.9%. 1000 milliLiter(s) (80 mL/Hr) IV Continuous <Continuous>    MEDICATIONS  (PRN):  acetaminophen   Tablet .. 650 milliGRAM(s) Oral every 6 hours PRN Temp greater or equal to 38C (100.4F)  acetaminophen   Tablet .. 650 milliGRAM(s) Oral every 6 hours PRN Mild Pain (1 - 3), Moderate Pain (4 - 6)  hydrOXYzine hydrochloride 25 milliGRAM(s) Oral every 8 hours PRN sleep/anxiety      __________________________________________________  REVIEW OF SYSTEMS:    CONSTITUTIONAL: No fever,   EYES: no acute visual disturbances  NECK: No pain or stiffness  RESPIRATORY: No cough; No shortness of breath  CARDIOVASCULAR: No chest pain, no palpitations  GASTROINTESTINAL: No pain. No nausea or vomiting; No diarrhea   NEUROLOGICAL: No headache or numbness, no tremors  MUSCULOSKELETAL: No joint pain, no muscle pain  GENITOURINARY: no dysuria, no frequency, no hesitancy  PSYCHIATRY: no depression , no anxiety  ALL OTHER  ROS negative        Vital Signs Last 24 Hrs  T(C): 36.8 (11 Dec 2020 05:34), Max: 36.8 (11 Dec 2020 05:34)  T(F): 98.2 (11 Dec 2020 05:34), Max: 98.2 (11 Dec 2020 05:34)  HR: 74 (11 Dec 2020 05:34) (72 - 80)  BP: 119/63 (11 Dec 2020 05:34) (117/65 - 125/66)  BP(mean): --  RR: 18 (11 Dec 2020 05:34) (17 - 18)  SpO2: 95% (11 Dec 2020 05:34) (95% - 95%)    ________________________________________________  PHYSICAL EXAM:  GENERAL: NAD  HEENT: Normocephalic;  conjunctivae and sclerae clear; moist mucous membranes;   NECK : supple  CHEST/LUNG: Clear to auscultation bilaterally with good air entry   HEART: S1 S2  regular; no murmurs, gallops or rubs  ABDOMEN: Soft, Nontender, Nondistended; Bowel sounds present  EXTREMITIES: no cyanosis; no edema; no calf tenderness  SKIN: warm and dry; no rash  NERVOUS SYSTEM:  Awake and alert; Oriented  to place, person and time ; no new deficits    _________________________________________________  LABS:            Urinalysis Basic - ( 09 Dec 2020 19:28 )    Color: Yellow / Appearance: Clear / S.020 / pH: x  Gluc: x / Ketone: Negative  / Bili: Negative / Urobili: Negative   Blood: x / Protein: Negative / Nitrite: Negative   Leuk Esterase: Negative / RBC: 5-10 /HPF / WBC 0-2 /HPF   Sq Epi: x / Non Sq Epi: Occasional /HPF / Bacteria: Trace /HPF      CAPILLARY BLOOD GLUCOSE            RADIOLOGY & ADDITIONAL TESTS:    Imaging  Reviewed:  YES/NO    Consultant(s) Notes Reviewed:   YES/ No      Plan of care was discussed with patient and /or primary care giver; all questions and concerns were addressed

## 2020-12-11 NOTE — PROGRESS NOTE ADULT - PROBLEM SELECTOR PLAN 3
Now rate controlled   Continue eliquis, metoprolol, digoxin
Now rate controlled   Continue eliquis, metoprolol, digoxin
Cont Lopressor 25mg BID
Now rate controlled   Continue eliquis, metoprolol, digoxin
Pt. with anxiety per psych eval  -Recommended hydroxyzine 25mg q8hrs for sleep/anxiety
RISK                                                          Points  [  ] Previous VTE                                                3  [  ] Thrombophilia                                             2  [  ] Lower limb paralysis                                   2        (unable to hold up >15 seconds)    [  ] Current Cancer                                             2         (within 6 months)  [ x ] Immobilization > 24 hrs                              1  [  ] ICU/CCU stay > 24 hours                             1  [ x ] Age > 60                                                         1    IMPROVE VTE Score: 2  lovenox for VTE prophylaxis.
RISK                                                          Points  [  ] Previous VTE                                                3  [  ] Thrombophilia                                             2  [  ] Lower limb paralysis                                   2        (unable to hold up >15 seconds)    [  ] Current Cancer                                             2         (within 6 months)  [ x ] Immobilization > 24 hrs                              1  [  ] ICU/CCU stay > 24 hours                             1  [ x ] Age > 60                                                         1    IMPROVE VTE Score: 2  lovenox for VTE prophylaxis.
Serum Albumin 2.6->2.1, likely due to poor nutrition  Nutrition note appreciated  Enlive 1can BID added to diet.
Started on Lopressor 12.5mg q8hrs

## 2020-12-11 NOTE — PROGRESS NOTE ADULT - PROBLEM SELECTOR PLAN 1
Admitted with sepsis, now resolved   Completed two courses of antibiotics   Urine cultures with e coli, blood cultures negative   ID Dr. Pulido - no more ABT
Admitted with sepsis, now resolved   Completed two courses of antibiotics   Urine cultures with e coli, blood cultures negative   ID Dr. Pulido - no more ABT
Admitted with sepsis, now resolved   Completed two courses of antibiotics   Urine cultures with e coli, blood cultures negative   ID Dr. Pulido
Sepsis likely secondary to UTI-Improved  -Urinalysis +ve  -Urine culture grew E. Coli, sens pending  -Blood Cx NTD  -Cont Ceftriaxone D2/7 for now  -ID Dr. Pulido  -f/u WBC
Sepsis likely secondary to UTI-Improved  -Urinalysis +ve  -Urine culture grew E. Coli, sens to Ceftriaxone  -Blood Cx NTD  -Cont Ceftriaxone D3/7   -ID Dr. Pulido  -Afebrile with mild leukocytosis  -Jaimes removed, voiding freely
Sepsis likely secondary to UTI-Repeat UA positive  -Ceftriaxone per ID  -Remains afebrile with very mild leukocytosis off Abx  -Bladder scan<100ml
Sepsis likely secondary to UTI-Resolved  -Completed Abx  -Remains afebrile with very mild leukocytosis off Abx  -Bladder scan<100ml
Sepsis likely secondary to UTI-Resolved  Completed Abx  Remains afebrile with very mild leukocytosis off Abx  Bladder scan<100ml.
UA 11/30 positive with mild leukocytosis  -Cont Cipro 500mg q12hrs  -Currently afebrile with no leukocytosis
p/w lower abdominal pain and urinary symptoms  /74, HR 95, WBC 12.37 K  Urinalysis +ve   Sepsis likely secondary to UTI  s/p Ceftriaxone and 1700 cc in ED  Started on ceftriaxone  on iv fluids  f/u Urine Cx and Blood Cx  ID Dr. Pulido
p/w lower abdominal pain and urinary symptoms  /74, HR 95, WBC 12.37 K  Urinalysis +ve   Sepsis likely secondary to UTI  s/p Ceftriaxone and 1700 cc in ED  Started on ceftriaxone  on iv fluids  f/u Urine Cx and Blood Cx  ID Dr. Pulido

## 2020-12-28 PROCEDURE — 80053 COMPREHEN METABOLIC PANEL: CPT

## 2020-12-28 PROCEDURE — 80048 BASIC METABOLIC PNL TOTAL CA: CPT

## 2020-12-28 PROCEDURE — 87040 BLOOD CULTURE FOR BACTERIA: CPT

## 2020-12-28 PROCEDURE — 84443 ASSAY THYROID STIM HORMONE: CPT

## 2020-12-28 PROCEDURE — 80162 ASSAY OF DIGOXIN TOTAL: CPT

## 2020-12-28 PROCEDURE — 97116 GAIT TRAINING THERAPY: CPT

## 2020-12-28 PROCEDURE — 93306 TTE W/DOPPLER COMPLETE: CPT

## 2020-12-28 PROCEDURE — 81001 URINALYSIS AUTO W/SCOPE: CPT

## 2020-12-28 PROCEDURE — 87635 SARS-COV-2 COVID-19 AMP PRB: CPT

## 2020-12-28 PROCEDURE — 83735 ASSAY OF MAGNESIUM: CPT

## 2020-12-28 PROCEDURE — 84100 ASSAY OF PHOSPHORUS: CPT

## 2020-12-28 PROCEDURE — 97110 THERAPEUTIC EXERCISES: CPT

## 2020-12-28 PROCEDURE — 87186 SC STD MICRODIL/AGAR DIL: CPT

## 2020-12-28 PROCEDURE — 36415 COLL VENOUS BLD VENIPUNCTURE: CPT

## 2020-12-28 PROCEDURE — 99285 EMERGENCY DEPT VISIT HI MDM: CPT | Mod: 25

## 2020-12-28 PROCEDURE — 85027 COMPLETE CBC AUTOMATED: CPT

## 2020-12-28 PROCEDURE — 82962 GLUCOSE BLOOD TEST: CPT

## 2020-12-28 PROCEDURE — 0225U NFCT DS DNA&RNA 21 SARSCOV2: CPT

## 2020-12-28 PROCEDURE — 97530 THERAPEUTIC ACTIVITIES: CPT

## 2020-12-28 PROCEDURE — 84145 PROCALCITONIN (PCT): CPT

## 2020-12-28 PROCEDURE — 85025 COMPLETE CBC W/AUTO DIFF WBC: CPT

## 2020-12-28 PROCEDURE — 82746 ASSAY OF FOLIC ACID SERUM: CPT

## 2020-12-28 PROCEDURE — 83605 ASSAY OF LACTIC ACID: CPT

## 2020-12-28 PROCEDURE — 82607 VITAMIN B-12: CPT

## 2020-12-28 PROCEDURE — 83036 HEMOGLOBIN GLYCOSYLATED A1C: CPT

## 2020-12-28 PROCEDURE — 80061 LIPID PANEL: CPT

## 2020-12-28 PROCEDURE — 93005 ELECTROCARDIOGRAM TRACING: CPT

## 2020-12-28 PROCEDURE — 86769 SARS-COV-2 COVID-19 ANTIBODY: CPT

## 2020-12-28 PROCEDURE — 87086 URINE CULTURE/COLONY COUNT: CPT

## 2020-12-28 PROCEDURE — 97162 PT EVAL MOD COMPLEX 30 MIN: CPT

## 2020-12-28 PROCEDURE — 71045 X-RAY EXAM CHEST 1 VIEW: CPT

## 2021-04-25 NOTE — PROGRESS NOTE ADULT - SUBJECTIVE AND OBJECTIVE BOX
JOSE GARDNER  MR# [id number]  85yFemale        Patient is a 85y old  Female who presents with a chief complaint of abdominal pain and urinary symptoms (07 Dec 2020 09:07)      INTERVAL HPI/OVERNIGHT EVENTS:  Patient seen and examined at bedside. No notations of chest pain, palpitation, SOB, orthopnea, nausea, vomiting or abdominal pain.    ALLERGIES  No Known Allergies      MEDICATIONS  acetaminophen   Tablet .. 650 milliGRAM(s) Oral every 6 hours PRN Temp greater or equal to 38C (100.4F)  acetaminophen   Tablet .. 650 milliGRAM(s) Oral every 6 hours PRN Mild Pain (1 - 3), Moderate Pain (4 - 6)  apixaban 2.5 milliGRAM(s) Oral every 12 hours  digoxin     Tablet 0.125 milliGRAM(s) Oral daily  hydrOXYzine hydrochloride 25 milliGRAM(s) Oral every 8 hours PRN sleep/anxiety  metoprolol tartrate 25 milliGRAM(s) Oral two times a day  nystatin Ointment 1 Application(s) Topical two times a day  pantoprazole    Tablet 40 milliGRAM(s) Oral before breakfast  sodium chloride 0.9%. 1000 milliLiter(s) IV Continuous <Continuous>              REVIEW OF SYSTEMS:  CONSTITUTIONAL: No fever, weight loss, or fatigue  EYES: No eye pain, visual disturbances, or discharge  ENT:  No difficulty hearing, tinnitus, vertigo; No sinus or throat pain  NECK: No pain or stiffness  RESPIRATORY: No cough, wheezing, chills or hemoptysis; No Shortness of Breath  CARDIOVASCULAR: No chest pain, palpitations, passing out, dizziness, or leg swelling  GASTROINTESTINAL: No abdominal or epigastric pain. No nausea, vomiting, or hematemesis; No diarrhea or constipation. No melena or hematochezia.  GENITOURINARY: No dysuria, frequency, hematuria, or incontinence  NEUROLOGICAL: No headaches, memory loss, loss of strength, numbness, or tremors  SKIN: No itching, burning, rashes, or lesions   LYMPH Nodes: No enlarged glands  ENDOCRINE: No heat or cold intolerance; No hair loss  MUSCULOSKELETAL: No joint pain or swelling; No muscle, back, or extremity pain  PSYCHIATRIC: No depression, anxiety, mood swings, or difficulty sleeping  HEME/LYMPH: No easy bruising, or bleeding gums  ALLERGY AND IMMUNOLOGIC: No hives or eczema	    [ ] All others negative	  [ ] Unable to obtain      T(C): 36.4 (12-07-20 @ 05:47), Max: 37.1 (12-06-20 @ 14:55)  T(F): 97.6 (12-07-20 @ 05:47), Max: 98.7 (12-06-20 @ 14:55)  HR: 77 (12-07-20 @ 05:47) (77 - 101)  BP: 123/60 (12-07-20 @ 05:47) (116/60 - 123/60)  RR: 16 (12-07-20 @ 05:47) (16 - 17)  SpO2: 96% (12-07-20 @ 05:47) (94% - 96%)  Wt(kg): --    I&O's Summary    06 Dec 2020 07:01  -  07 Dec 2020 07:00  --------------------------------------------------------  IN: 0 mL / OUT: 500 mL / NET: -500 mL          PHYSICAL EXAM:  A X O x  HEAD:  Atraumatic, Normocephalic  EYES: EOMI, PERRLA, conjunctiva and sclera clear  NECK: Supple, No JVD, Normal thyroid  Resp: CTAB, No crackles, wheezing,   CVS: Regular rate and rhythm; No discernable murmurs, rubs, or gallops  ABD: Soft, Nontender, Nondistended; Bowel sounds present  EXTREMITIES:  2+ Peripheral Pulses, No edema  LYMPH: No dicernable lymphadenopathy noted  GENERAL: NAD, well-groomed, well-developed      LABS:              CAPILLARY BLOOD GLUCOSE          Troponins:  ProBNP:  Lipid Profile:   HgA1c:  TSH:           RADIOLOGY & ADDITIONAL TESTS:    Imaging Personally Reviewed:  [ ] YES  [ ] NO      Consultant(s) Notes Reviewed:  [x ] YES  [ ] NO    Care Discussed with Consultants/Other Providers [ x] YES  [ ] NO          PAST MEDICAL & SURGICAL HISTORY:  Spinal stenosis, unspecified spinal region    Scoliosis, unspecified scoliosis type, unspecified spinal region    Cataract of both eyes, unspecified cataract type          Sepsis    No pertinent family history in first degree relatives    Handoff    MEWS Score    Spinal stenosis, unspecified spinal region    Scoliosis, unspecified scoliosis type, unspecified spinal region    Sepsis without acute organ dysfunction, due to unspecified organism    Anxiety    Adjustment disorder    Adjustment disorder with anxiety    Discharge planning issues    Hypoalbuminemia due to protein-calorie malnutrition    Urinary retention    Paroxysmal atrial fibrillation with RVR    HTN (hypertension)    Need for prophylactic measure    Sepsis secondary to UTI    Cataract of both eyes, unspecified cataract type    A ABDOMINAL PAIN    Urinary tract infection without hematuria, site unspecified    SysAdmin_VisitLink             I personally performed the service described in the documentation recorded by the scribe in my presence, and it accurately and completely records my words and actions.

## 2021-04-29 NOTE — PROGRESS NOTE ADULT - PROBLEM SELECTOR PLAN 7
Pt. lives alone.  Her  passed away due to COVID last May, she has no children or other relatives,  Pt.'s neighbor calls frequently to inform us that pt. will need assistance/placement as her home situation is unsafe due to hoarding.  PT recommended SANTOS[facility]  Pt. adamantly refusing SANTOS[facility].  Can not discharge to home as she as a do not discharge order from [facility]  SW/CM following  Awaiting psych eval Z Plasty Text: The lesion was extirpated to the level of the fat with a #15 scalpel blade.  Given the location of the defect, shape of the defect and the proximity to free margins a Z-plasty was deemed most appropriate for repair.  Using a sterile surgical marker, the appropriate transposition arms of the Z-plasty were drawn incorporating the defect and placing the expected incisions within the relaxed skin tension lines where possible.    The area thus outlined was incised deep to adipose tissue with a #15 scalpel blade.  The skin margins were undermined to an appropriate distance in all directions utilizing iris scissors.  The opposing transposition arms were then transposed into place in opposite direction and anchored with interrupted buried subcutaneous sutures.

## 2022-02-15 NOTE — ED PROVIDER NOTE - CPE EDP HEME LYMPH NORM
normal... Ftsg Text: The defect edges were debeveled with a #15 scalpel blade.  Given the location of the defect, shape of the defect and the proximity to free margins a full thickness skin graft was deemed most appropriate.  Using a sterile surgical marker, the primary defect shape was transferred to the donor site. The area thus outlined was incised deep to adipose tissue with a #15 scalpel blade.  The harvested graft was then trimmed of adipose tissue until only dermis and epidermis was left.  The skin margins of the secondary defect were undermined to an appropriate distance in all directions utilizing iris scissors.  The secondary defect was closed with interrupted buried subcutaneous sutures.  The skin edges were then re-apposed with running  sutures.  The skin graft was then placed in the primary defect and oriented appropriately.

## 2022-08-10 NOTE — H&P ADULT - ASSESSMENT
10-Aug-2022 08:13 85 yr F from home with PMH of spinal stenosis, scoliosis and CAD presents to ED with complaints of  lower abdominal pain and burning with urination since 4 days.    In ED /74, HR 95  WBC 12.37 K  UA +ve    Pt will admitted for Sepsis due to UTI 85 yr F from home with PMH of HTN, spinal stenosis, scoliosis presents to ED with complaints of  lower abdominal pain and burning with urination since 4 days.    In ED /74, HR 95  WBC 12.37 K  UA +ve    Pt will admitted for Sepsis due to UTI     PRIMARY TEAM TO CONFIRM MEDICATIONS WITH THE PHARMACY

## 2022-10-21 PROBLEM — M48.00 SPINAL STENOSIS, SITE UNSPECIFIED: Chronic | Status: ACTIVE | Noted: 2020-11-22

## 2022-10-21 PROBLEM — M41.9 SCOLIOSIS, UNSPECIFIED: Chronic | Status: ACTIVE | Noted: 2020-11-22

## 2022-10-23 ENCOUNTER — LABORATORY RESULT (OUTPATIENT)
Age: 87
End: 2022-10-23

## 2022-10-24 ENCOUNTER — APPOINTMENT (OUTPATIENT)
Dept: OBGYN | Facility: CLINIC | Age: 87
End: 2022-10-24

## 2022-10-24 VITALS
WEIGHT: 108 LBS | SYSTOLIC BLOOD PRESSURE: 144 MMHG | DIASTOLIC BLOOD PRESSURE: 70 MMHG | HEIGHT: 63 IN | BODY MASS INDEX: 19.14 KG/M2

## 2022-10-24 DIAGNOSIS — B02.9 ZOSTER W/OUT COMPLICATIONS: ICD-10-CM

## 2022-10-24 DIAGNOSIS — M41.9 SCOLIOSIS, UNSPECIFIED: ICD-10-CM

## 2022-10-24 DIAGNOSIS — I10 ESSENTIAL (PRIMARY) HYPERTENSION: ICD-10-CM

## 2022-10-24 DIAGNOSIS — Z01.419 ENCOUNTER FOR GYNECOLOGICAL EXAMINATION (GENERAL) (ROUTINE) W/OUT ABNORMAL FINDINGS: ICD-10-CM

## 2022-10-24 DIAGNOSIS — E78.00 PURE HYPERCHOLESTEROLEMIA, UNSPECIFIED: ICD-10-CM

## 2022-10-24 PROBLEM — Z00.00 ENCOUNTER FOR PREVENTIVE HEALTH EXAMINATION: Status: ACTIVE | Noted: 2022-10-24

## 2022-10-24 PROCEDURE — 99202 OFFICE O/P NEW SF 15 MIN: CPT | Mod: 25

## 2022-10-24 PROCEDURE — 99387 INIT PM E/M NEW PAT 65+ YRS: CPT

## 2022-10-24 RX ORDER — VALACYCLOVIR 500 MG/1
500 TABLET, FILM COATED ORAL TWICE DAILY
Qty: 1 | Refills: 2 | Status: ACTIVE | COMMUNITY
Start: 2022-10-24 | End: 1900-01-01

## 2022-10-25 PROBLEM — Z01.419 WELL WOMAN EXAM WITH ROUTINE GYNECOLOGICAL EXAM: Status: ACTIVE | Noted: 2022-10-25

## 2022-10-25 NOTE — PHYSICAL EXAM
[Appropriately responsive] : appropriately responsive [Alert] : alert [No Acute Distress] : no acute distress [No Lymphadenopathy] : no lymphadenopathy [Regular Rate Rhythm] : regular rate rhythm [No Murmurs] : no murmurs [Clear to Auscultation B/L] : clear to auscultation bilaterally [Soft] : soft [Non-tender] : non-tender [Non-distended] : non-distended [No HSM] : No HSM [No Lesions] : no lesions [No Mass] : no mass [Oriented x3] : oriented x3 [Examination Of The Breasts] : a normal appearance [No Masses] : no breast masses were palpable [Labia Majora] : normal [Labia Minora] : normal [Normal] : normal [Uterine Adnexae] : normal [FreeTextEntry6] : dermatone resolving herpes zooster

## 2022-10-25 NOTE — HISTORY OF PRESENT ILLNESS
[FreeTextEntry1] : pt comes for evaluatioj of a mario on the left breast that started two weeks ago , she told pcp it was mastitis and has not gone away with antibiotics . She did not go to see him . She has no pains from it . She is a retired psychiatrist .

## 2023-02-10 NOTE — H&P ADULT - NSICDXNOFAMILYHX_GEN_ALL_CORE
Pre-Operative Instructions    Special Instructions for your procedure from Efren Donnelly MD    Patient Name: Roxana Edwards Procedure/Surgery: Bilateral   tympanostomy tube placement   Surgery Date: 2/28/23  Surgery Time: 7:30am     Arrival Time: You will receive a call from the Ambulatory Surgery Department approximately 3 days prior to your surgery date. At that time, the nurse will review your health history, give you instructions for the ASC and the Arrival time for your procedure.    Please ignore any Robo calls or times in the [facility] Well shahid. and only follow the time given by the ASC staff.  If you do not receive a call within 2 days of your surgery, please call [phone].     [facility] requires History and Physical Exams to be done within 30 days of surgery. If this appointment is not completed within this time frame, an additional visit and co-pay may apply.      o History and Physical (to be completed 2 weeks prior to surgery): N/A  o Medications: To be taken as directed by PCP office, please discuss during your history and physical appointment.   o Pre op labs required prior to surgery per surgeon : N/A  o EKG needed (all patients over 50 years old) [address] [facility] Cardiology department WALK IN 8:00am to 4:45pm: N/A  o Post-op Appointment: 3/24/23 at 4:15 PM   o Additional orders: N/A    Nothing to eat or drink after midnight the night before surgery, no breakfast, no water, no coffee, etc.    You need someone to be with you and drive you home, stay the remainder of the day and night while anesthesia is in your system.    ALL NSAID'S, herbal supplements and vitamins to hold 1 week prior to surgical procedure. Clearance will be obtained prior to surgery.    If you have FMLA/disability paperwork, collect it from work and bring it into any desk staff at an advocate site to sent to disability.     On the day of surgery:  - Please bring with you a photo ID and insurance card.   Failure to have  these documents will result in cancellation of your surgery.   -Failure to complete any of the following requirements may result in the delay or cancellation of your surgery.     : Sami               Contact Number: [phone]      [facility] Surgery Center Guidelines    1. If there is any change in your health, please contact your surgeon (fever, chest pain, shortness of breath, respiratory infections), or if you have started any new medications since your surgery was scheduled.    2. Plan for unforeseen changes in surgery time.      Although we try to maintain a set surgery schedule, there are times when a surgery case may take longer than expected. This may result in your being in the surgery center longer than originally planned. It is best to \"free up\" your entire day to allow for any unforeseen time changes.     3.  Arrange for an adult (18 yrs old or greater) to stay with you at the surgery center. It is very important that you have an adult stay with you at the surgery center during your procedure. The surgeon will want to discuss your surgery and post-operative care with an adult friend or adult family member. Additionally, an adult will be needed to drive you home. An adult must stay with you for the first 24 hours after your surgery. IF YOU DO NOT HAVE AN ADULT TO DRIVE YOU HOME, YOUR SURGERY WILL BE CANCELLED.     4. Make arrangements for young children to stay with a  or family member. Children under the age of 12 must stay in the waiting area and must be accompanied by an adult at all times.     5.  Medications             Unless otherwise instructed by your physician: STOP over-the-counter medications, anti-inflammatories (for example Aspirin, Advil, Aleve, Ibuprofen, Motrin), Vitamins, herbal supplements for 7 days prior to surgery. Tylenol, or Acetaminophen based products may be used.    PATIENTS ON BLOOD THINNERS  IF YOU HAVE NOT RECEIVED INSTRUCTIONS REGARDING YOUR BLOOD  THINNING MEDICATIONS WITHIN 7 DAYS OF YOUR SURGERY, PLEASE CALL THE SURGEON'S OFFICE TO SPEAK TO NURSE FOR FOLLOW UP. FAILURE TO DO SO MAY RESULT IN CANCELLATION OF YOUR SURGERY.     Medication Instructions for Day of Surgery:   · Please take your cardiac medicine, high blood pressure medicine, prednisone, and any seizure medication with a sip of water. Do not take any diuretic (water pills) or oral medication for diabetes.  · For insulin dependent diabetics:  Do not take any short acting or sliding scale insulin in the morning.  Unless otherwise directed by your personal physician, follow the following instructions: If your procedure is scheduled before noon, take one half of your long acting insulin in the morning.  If your procedure is scheduled after 12 noon, take one third of your long acting insulin in the morning.       6.  Remember to follow pre-operative dietary restrictions.     An empty stomach is imperative to prevent nausea or vomiting during and after your procedure. It is important that you \"not eat or drink anything\" after midnight the night of your scheduled surgery. Even that cup of coffee seems harmless, it may lead to a significant delay or even a cancellation of your surgery. Have fluids and soft bland food available at home for the initial recovery period.       7. Things to bring with you:  · a list of your current medications (including \"over the counter\" and herbal medications)  · important legal documents such as 's license, insurance card, Power of , Guardianship papers  · any assistive device you are currently using (crutches, walker, hearing aid, etc.)  · Inhalers    8. Things to leave at home: jewelry, piercings, purse, wallet, money, and other valuable items. Do not wear makeup or contact lenses.    9. Limit visitors while you are at the surgery center.    10. Avoid alcoholic beverages for at least 24 hours prior to your surgery.    11.  Wear loose comfortable clothes.  Clothes that are easy to put on and take off are best. Sweat pants, shirts with buttons, and slip-on shoes are wise choices. Avoid tight-fitting clothing such as blue jeans and turtlenecks.       12. Ask questions if you are unsure about any information given to you. We always want you to feel safe and confident in the care you are receiving.    13.  Location:  [facility] is located at [facility], [address] [place] [address] in [place], [place]. Enter through the main entrance. When you get inside the first set of glass doors, look to the left. On the glass wall is a sign that reads [facility]. Go through the doors and take the stairs or the elevator down to the lower level. Check in with the .       TO ALL AMBULATORY SURGERY PATIENTS    The [facility] utilizes [facility], for anesthesia services. Patients receiving services at the [facility] may receive three separate bills - one from [facility] for professional services, one from the [facility] for facility services, and one from [facility], for anesthesiology services. Please verify with your insurer that [facility], are in your network as it does differ from [facility].    You can decide today about the care you will receive in the future.  [facility] respects your rights and will support your decisions to the fullest extent permitted by law, including your right to refuse or accept medical or surgical treatment. Please be advised that the [facility] prohibits ambulatory surgery centers from upholding Advanced Directives during surgical procedures. For this reason, any Advanced Directive will be null and void during your stay in the Ambulatory Surgery Center.     Although not honored in the Ambulatory Surgery Center, you are still entitled to be informed about your rights surrounding  Advanced Directives. Advanced Directives are legal documents that enable you to specify what forms of treatment you want performed or withheld should you become unable to make or communicate these decisions on your own.  Although this is not a common decision made by outpatient surgery patients, we would like to let you know that an information booklet, \"A Personal Decision\" is available upon your request.      How do you know if an Advanced Directive is right for you?  It is important to realize your rights as an individual, what the nature of consent for treatment implies, what a durable power of  for health care is and what a living will is.      After reviewing the booklet, \"A Personal Decision,\" should you have any further questions, please call us at [phone].      Thank you.     PATIENT’S RIGHTS    I. To be treated with respect, consideration and dignity, free from all forms of abuse, harassment and discrimination.     II. To receive quality care and high professional standards in a safe environment.    II. To be provided with appropriate privacy.    III. To expect that all disclosures and records are treated confidentially and, except when required by law, to be given the opportunity to approve or refuse their release.    IV. To be provided, to the degree known, complete information concerning their diagnosis, treatment and prognosis. When appropriate, the information is provided to a person designated by the patient to be a legally authorized person/surrogate.    V. To review the records pertaining to his/her medical care and to have the information explained or interpreted as necessary except when restricted law.    VI. To expect that we will communicate with you in a matter that you can understand.     VII. To be given the names of all practitioners and health care personnel participating in his/her care.    VIII. To make decisions about the plan of care prior to and during the course of  treatment.  IX. To refuse a recommended treatment or plan of care to the extent permitted by law and to be informed of the medical consequences of this action.     X. To expect that your treatment preferences will be responded to as delineated in your Advanced Directive, within the limits of the ASC bylaws regarding resuscitation    XI. To be informed as to:  A. Rights and Responsibilities.  B. Services available in the organization.  C. Provisions for after-hours and emergency care.  D.  The charges for services and available payment options.   E. The right to consent or decline participation in proposed research  studies.  F.  The available resources for resolving disputes, grievances, and conflicts.      XII. To expect emergency procedures to be implemented without unnecessary delay.    XIII. To expect a safe hospital transfer with appropriate medical records when necessary.     XIV. To know that all patients rights extend to the patient’s legal representatives/surrogates.     XV. Complaints regarding Patients Rights or any issue surrounding care received during your stay can be made by contacting any of the following organizations:  i. Medicare patient: Visit the Office of Medicare Beneficiary [place] at www.medicare.gov on the web.  Or call 1-800-Medicare (1-237.707.7511).     TTY users should call 1-377.850.9573.  ii. Non-Medicare patients can contact the [place] Department of Public Health at [phone]; fax [phone], TTY [phone].  iii. Any patient can also contact the [employer] at [phone] (toll free 8:30 am to 5:00 pm, central time, weekdays).      *The Ambulatory Surgery Center is a partnership between [facility] and [facility].     Patient Responsibilities    It is your responsibility as a [facility] patient:    · To provide all personal and family health information needed to provide you with appropriate care.    · To  participate to the best of your ability in making decisions about your medical treatment, and to comply with the agreed upon plan of care.    · To ask questions of your physician or other care providers when you do not understand any information or instructions.    · To maintain appointments as scheduled, or to reschedule in a timely fashion.    · To inform your physician and other care providers if you anticipate problems in following prescribed treatment.    · To recognize the impact of your lifestyle on your personal health.    · To inform your physician or other care provider if you desire a transfer of care to another physician.    · To be considerate of others receiving and providing care.  To observe relevant surgery center policies and procedures.    · To accept financial responsibility for health care services and to work cooperatively with [facility] to resolve financial obligations     <-- Click to add NO pertinent Family History

## 2023-11-29 NOTE — ED ADULT NURSE NOTE - PAIN RATING/NUMBER SCALE (0-10): REST
No protocol for requested medication     Medication: hydrocodone-acetaminophen  Last office visit date: 03/23/2023  Pharmacy: [id number] - [place], [place] - [address]    Order pended, routed to clinician for review.       Orders pended, and routed to provider's nurse pool for review and or approval.   Please route any notes back to your nursing pool.       Thank you, Refill Center Staff   0

## 2024-01-09 NOTE — PROGRESS NOTE ADULT - I WAS PHYSICALLY PRESENT FOR THE KEY PORTIONS OF THE EVALUATION AND MANAGEMENT (E/M) SERVICE PROVIDED.  I AGREE WITH THE ABOVE HISTORY, PHYSICAL, AND PLAN WHICH I HAVE REVIEWED AND EDITED WHERE APPROPRIATE
Patient:  Amanda Orr   5/7/1925           YOAN Germain saw Amanda Orr for a wound care visit on 1/9/2024. See below for information relating to this visit.      Chief Complaint   Patient presents with    New Patient Visit     Multiple wounds        Assessment/Plan:  1. Pressure injury of sacral region, stage 3 (HCC)  Assessment & Plan:  Sacral wound  Full-thickness, with no obvious sign of infection  Local wound care with Myra shield  Continue to offload  Increase protein intake  Follow-up next week        2. Ambulatory dysfunction  Assessment & Plan:  On 24/7 restorative care      3. Degloving injury  Assessment & Plan:  Left lower leg anterior  Covered with eschar, 0.3 depth, with no obvious sign of infection  Local wound care with Betadine  Continue to offload  Follow-up next week      4. Traumatic injury to skin or subcutaneous tissue  Assessment & Plan:  Bilateral lower legs  The wound bed on the left lateral calf and right lateral calf is granulation, with no drainage, dry,  Local wound care with hydrogel  The wound bed on right anterior lower leg and right medial knee is covered with eschar, local wound care with Betadine  Continue to offload  Increase protein intake  Follow-up next week               Orders:  Amanda Orr  5/7/1925  Wound:  Sacrum  Discontinue previous wound order  Cleanse the wound bed with NSS   Apply non-sting skin prep to periwound area  Apply dynashield to wound bed, then cover with mepilex   Frequency : daily and prn for soiling    Wound : Left lateral lower leg and right lateral lower leg  Cleanse with normal saline solution or wound cleanser  Apply Skin-Prep to periwound area  Apply hydrogel to wound bed and cover with ABD and rolled gauze  Daily and as needed for soiling    Wound: Left lower leg anterior, right lower leg anterior, right medial knee  Cleanse with normal saline solution or wound cleanser  Apply Betadine to wound bed  Cover with rolled 
gauze  Daily and as needed for soiling    Offload all wounds  Turn and reposition frequently,   Increase protein intake.  Monitor for any sign of infection or worsening, inform PCP or patient's primary physician in your facility.        Follow Up:  Return in about 1 week (around 1/16/2024).       [facility] hours are 8:00 am - 4:30 pm Monday through Friday. The center phone number is [phone]. You can also contact me directly thru my email at [email or web address] or thru tiger text. If it is an emergency, please contact the PCP or patient's attending physician in your facility.     Sincerely,    Electronically signed by YOAN Germain    Patient : Amanda Orr    5/7/1925       
Statement Selected